# Patient Record
Sex: MALE | Race: WHITE | NOT HISPANIC OR LATINO | Employment: OTHER | URBAN - METROPOLITAN AREA
[De-identification: names, ages, dates, MRNs, and addresses within clinical notes are randomized per-mention and may not be internally consistent; named-entity substitution may affect disease eponyms.]

---

## 2023-10-03 ENCOUNTER — APPOINTMENT (OUTPATIENT)
Dept: RADIOLOGY | Facility: MEDICAL CENTER | Age: 68
DRG: 291 | End: 2023-10-03
Attending: EMERGENCY MEDICINE
Payer: MEDICARE

## 2023-10-03 ENCOUNTER — HOSPITAL ENCOUNTER (INPATIENT)
Facility: MEDICAL CENTER | Age: 68
LOS: 7 days | DRG: 291 | End: 2023-10-11
Attending: EMERGENCY MEDICINE | Admitting: STUDENT IN AN ORGANIZED HEALTH CARE EDUCATION/TRAINING PROGRAM
Payer: MEDICARE

## 2023-10-03 DIAGNOSIS — R06.00 DYSPNEA, UNSPECIFIED TYPE: ICD-10-CM

## 2023-10-03 DIAGNOSIS — Z91.89 AT RISK FOR FLUID VOLUME OVERLOAD: ICD-10-CM

## 2023-10-03 DIAGNOSIS — I10 PRIMARY HYPERTENSION: ICD-10-CM

## 2023-10-03 DIAGNOSIS — J18.9 COMMUNITY ACQUIRED PNEUMONIA OF RIGHT UPPER LOBE OF LUNG: ICD-10-CM

## 2023-10-03 DIAGNOSIS — R09.02 HYPOXIA: Primary | ICD-10-CM

## 2023-10-03 DIAGNOSIS — R91.8 LUNG MASS: ICD-10-CM

## 2023-10-03 DIAGNOSIS — I50.9 ACUTE ON CHRONIC CONGESTIVE HEART FAILURE, UNSPECIFIED HEART FAILURE TYPE (HCC): ICD-10-CM

## 2023-10-03 DIAGNOSIS — M10.9 ACUTE GOUT OF RIGHT KNEE, UNSPECIFIED CAUSE: ICD-10-CM

## 2023-10-03 DIAGNOSIS — R60.0 LOWER EXTREMITY EDEMA: ICD-10-CM

## 2023-10-03 LAB
ALBUMIN SERPL BCP-MCNC: 3.5 G/DL (ref 3.2–4.9)
ALBUMIN/GLOB SERPL: 0.9 G/DL
ALP SERPL-CCNC: 71 U/L (ref 30–99)
ALT SERPL-CCNC: 125 U/L (ref 2–50)
AMPHET UR QL SCN: NEGATIVE
ANION GAP SERPL CALC-SCNC: 10 MMOL/L (ref 7–16)
AST SERPL-CCNC: 34 U/L (ref 12–45)
BARBITURATES UR QL SCN: NEGATIVE
BASOPHILS # BLD AUTO: 0.4 % (ref 0–1.8)
BASOPHILS # BLD: 0.04 K/UL (ref 0–0.12)
BENZODIAZ UR QL SCN: NEGATIVE
BILIRUB SERPL-MCNC: 0.4 MG/DL (ref 0.1–1.5)
BUN SERPL-MCNC: 47 MG/DL (ref 8–22)
BZE UR QL SCN: NEGATIVE
CALCIUM ALBUM COR SERPL-MCNC: 10.1 MG/DL (ref 8.5–10.5)
CALCIUM SERPL-MCNC: 9.7 MG/DL (ref 8.5–10.5)
CANNABINOIDS UR QL SCN: NEGATIVE
CHLORIDE SERPL-SCNC: 100 MMOL/L (ref 96–112)
CO2 SERPL-SCNC: 28 MMOL/L (ref 20–33)
CREAT SERPL-MCNC: 0.87 MG/DL (ref 0.5–1.4)
EKG IMPRESSION: NORMAL
EKG IMPRESSION: NORMAL
EOSINOPHIL # BLD AUTO: 0.1 K/UL (ref 0–0.51)
EOSINOPHIL NFR BLD: 1 % (ref 0–6.9)
ERYTHROCYTE [DISTWIDTH] IN BLOOD BY AUTOMATED COUNT: 58 FL (ref 35.9–50)
ETHANOL BLD-MCNC: <10.1 MG/DL
FENTANYL UR QL: NEGATIVE
GFR SERPLBLD CREATININE-BSD FMLA CKD-EPI: 95 ML/MIN/1.73 M 2
GLOBULIN SER CALC-MCNC: 3.8 G/DL (ref 1.9–3.5)
GLUCOSE SERPL-MCNC: 163 MG/DL (ref 65–99)
HCT VFR BLD AUTO: 63 % (ref 42–52)
HGB BLD-MCNC: 20.4 G/DL (ref 14–18)
IMM GRANULOCYTES # BLD AUTO: 0.05 K/UL (ref 0–0.11)
IMM GRANULOCYTES NFR BLD AUTO: 0.5 % (ref 0–0.9)
LACTATE SERPL-SCNC: 1.5 MMOL/L (ref 0.5–2)
LYMPHOCYTES # BLD AUTO: 1.49 K/UL (ref 1–4.8)
LYMPHOCYTES NFR BLD: 15.2 % (ref 22–41)
MCH RBC QN AUTO: 31.2 PG (ref 27–33)
MCHC RBC AUTO-ENTMCNC: 32.4 G/DL (ref 32.3–36.5)
MCV RBC AUTO: 96.3 FL (ref 81.4–97.8)
METHADONE UR QL SCN: NEGATIVE
MONOCYTES # BLD AUTO: 1.28 K/UL (ref 0–0.85)
MONOCYTES NFR BLD AUTO: 13.1 % (ref 0–13.4)
NEUTROPHILS # BLD AUTO: 6.82 K/UL (ref 1.82–7.42)
NEUTROPHILS NFR BLD: 69.8 % (ref 44–72)
NRBC # BLD AUTO: 0.02 K/UL
NRBC BLD-RTO: 0.2 /100 WBC (ref 0–0.2)
NT-PROBNP SERPL IA-MCNC: 2965 PG/ML (ref 0–125)
OPIATES UR QL SCN: NEGATIVE
OXYCODONE UR QL SCN: NEGATIVE
PCP UR QL SCN: NEGATIVE
PLATELET # BLD AUTO: 279 K/UL (ref 164–446)
PMV BLD AUTO: 9.6 FL (ref 9–12.9)
POTASSIUM SERPL-SCNC: 5.1 MMOL/L (ref 3.6–5.5)
PROPOXYPH UR QL SCN: NEGATIVE
PROT SERPL-MCNC: 7.3 G/DL (ref 6–8.2)
RBC # BLD AUTO: 6.54 M/UL (ref 4.7–6.1)
SODIUM SERPL-SCNC: 138 MMOL/L (ref 135–145)
TROPONIN T SERPL-MCNC: 26 NG/L (ref 6–19)
WBC # BLD AUTO: 9.8 K/UL (ref 4.8–10.8)

## 2023-10-03 PROCEDURE — 94760 N-INVAS EAR/PLS OXIMETRY 1: CPT

## 2023-10-03 PROCEDURE — 80053 COMPREHEN METABOLIC PANEL: CPT

## 2023-10-03 PROCEDURE — 83605 ASSAY OF LACTIC ACID: CPT

## 2023-10-03 PROCEDURE — 83880 ASSAY OF NATRIURETIC PEPTIDE: CPT

## 2023-10-03 PROCEDURE — 84484 ASSAY OF TROPONIN QUANT: CPT

## 2023-10-03 PROCEDURE — 700117 HCHG RX CONTRAST REV CODE 255: Performed by: EMERGENCY MEDICINE

## 2023-10-03 PROCEDURE — 96374 THER/PROPH/DIAG INJ IV PUSH: CPT

## 2023-10-03 PROCEDURE — 71275 CT ANGIOGRAPHY CHEST: CPT

## 2023-10-03 PROCEDURE — 87040 BLOOD CULTURE FOR BACTERIA: CPT

## 2023-10-03 PROCEDURE — 36415 COLL VENOUS BLD VENIPUNCTURE: CPT

## 2023-10-03 PROCEDURE — 96375 TX/PRO/DX INJ NEW DRUG ADDON: CPT

## 2023-10-03 PROCEDURE — A9270 NON-COVERED ITEM OR SERVICE: HCPCS | Performed by: EMERGENCY MEDICINE

## 2023-10-03 PROCEDURE — 80307 DRUG TEST PRSMV CHEM ANLYZR: CPT

## 2023-10-03 PROCEDURE — 99285 EMERGENCY DEPT VISIT HI MDM: CPT

## 2023-10-03 PROCEDURE — 93005 ELECTROCARDIOGRAM TRACING: CPT

## 2023-10-03 PROCEDURE — 85025 COMPLETE CBC W/AUTO DIFF WBC: CPT

## 2023-10-03 PROCEDURE — 82077 ASSAY SPEC XCP UR&BREATH IA: CPT

## 2023-10-03 PROCEDURE — 700111 HCHG RX REV CODE 636 W/ 250 OVERRIDE (IP): Mod: JZ | Performed by: EMERGENCY MEDICINE

## 2023-10-03 PROCEDURE — 93005 ELECTROCARDIOGRAM TRACING: CPT | Performed by: EMERGENCY MEDICINE

## 2023-10-03 PROCEDURE — 700102 HCHG RX REV CODE 250 W/ 637 OVERRIDE(OP): Performed by: EMERGENCY MEDICINE

## 2023-10-03 RX ORDER — FUROSEMIDE 10 MG/ML
80 INJECTION INTRAMUSCULAR; INTRAVENOUS ONCE
Status: COMPLETED | OUTPATIENT
Start: 2023-10-03 | End: 2023-10-03

## 2023-10-03 RX ORDER — CEFTRIAXONE 2 G/1
2000 INJECTION, POWDER, FOR SOLUTION INTRAMUSCULAR; INTRAVENOUS ONCE
Status: COMPLETED | OUTPATIENT
Start: 2023-10-03 | End: 2023-10-03

## 2023-10-03 RX ORDER — AZITHROMYCIN 250 MG/1
500 TABLET, FILM COATED ORAL ONCE
Status: COMPLETED | OUTPATIENT
Start: 2023-10-03 | End: 2023-10-03

## 2023-10-03 RX ADMIN — CEFTRIAXONE SODIUM 2000 MG: 2 INJECTION, POWDER, FOR SOLUTION INTRAMUSCULAR; INTRAVENOUS at 23:08

## 2023-10-03 RX ADMIN — IOHEXOL 50 ML: 350 INJECTION, SOLUTION INTRAVENOUS at 23:32

## 2023-10-03 RX ADMIN — FUROSEMIDE 80 MG: 10 INJECTION INTRAMUSCULAR; INTRAVENOUS at 23:05

## 2023-10-03 RX ADMIN — AZITHROMYCIN 500 MG: 250 TABLET, FILM COATED ORAL at 23:04

## 2023-10-04 ENCOUNTER — APPOINTMENT (OUTPATIENT)
Dept: RADIOLOGY | Facility: MEDICAL CENTER | Age: 68
DRG: 291 | End: 2023-10-04
Attending: HOSPITALIST
Payer: MEDICARE

## 2023-10-04 ENCOUNTER — APPOINTMENT (OUTPATIENT)
Dept: CARDIOLOGY | Facility: MEDICAL CENTER | Age: 68
DRG: 291 | End: 2023-10-04
Payer: MEDICARE

## 2023-10-04 PROBLEM — R60.9 EDEMA: Status: ACTIVE | Noted: 2023-10-04

## 2023-10-04 PROBLEM — R65.10 SIRS (SYSTEMIC INFLAMMATORY RESPONSE SYNDROME) (HCC): Status: ACTIVE | Noted: 2023-10-04

## 2023-10-04 PROBLEM — I50.41 ACUTE COMBINED SYSTOLIC AND DIASTOLIC CHF, NYHA CLASS 1 (HCC): Status: ACTIVE | Noted: 2023-10-04

## 2023-10-04 PROBLEM — I50.813 ACUTE ON CHRONIC RIGHT-SIDED HEART FAILURE (HCC): Status: ACTIVE | Noted: 2023-10-04

## 2023-10-04 PROBLEM — K74.60 CIRRHOSIS (HCC): Status: ACTIVE | Noted: 2023-10-04

## 2023-10-04 PROBLEM — R00.0 TACHYCARDIA: Status: ACTIVE | Noted: 2023-10-04

## 2023-10-04 PROBLEM — R91.8 PULMONARY MASS: Status: ACTIVE | Noted: 2023-10-04

## 2023-10-04 PROBLEM — J96.21 ACUTE ON CHRONIC RESPIRATORY FAILURE WITH HYPOXIA AND HYPERCAPNIA (HCC): Status: ACTIVE | Noted: 2023-10-04

## 2023-10-04 PROBLEM — E11.9 TYPE 2 DIABETES MELLITUS (HCC): Status: ACTIVE | Noted: 2023-10-04

## 2023-10-04 PROBLEM — J18.9 PNEUMONIA: Status: ACTIVE | Noted: 2023-10-04

## 2023-10-04 PROBLEM — J96.01 ACUTE HYPOXIC RESPIRATORY FAILURE (HCC): Status: ACTIVE | Noted: 2023-10-04

## 2023-10-04 PROBLEM — G93.40 ACUTE ENCEPHALOPATHY: Status: ACTIVE | Noted: 2023-10-04

## 2023-10-04 PROBLEM — J96.22 ACUTE ON CHRONIC RESPIRATORY FAILURE WITH HYPOXIA AND HYPERCAPNIA (HCC): Status: ACTIVE | Noted: 2023-10-04

## 2023-10-04 PROBLEM — R93.89 ABNORMAL CT OF THE CHEST: Status: ACTIVE | Noted: 2023-10-04

## 2023-10-04 PROBLEM — I10 HYPERTENSION: Status: ACTIVE | Noted: 2023-10-04

## 2023-10-04 PROBLEM — F10.10 ALCOHOL ABUSE: Status: ACTIVE | Noted: 2023-10-04

## 2023-10-04 LAB
ALBUMIN SERPL BCP-MCNC: 3.4 G/DL (ref 3.2–4.9)
ALBUMIN SERPL BCP-MCNC: 3.9 G/DL (ref 3.2–4.9)
ALBUMIN/GLOB SERPL: 0.9 G/DL
ALBUMIN/GLOB SERPL: 1 G/DL
ALP SERPL-CCNC: 64 U/L (ref 30–99)
ALP SERPL-CCNC: 69 U/L (ref 30–99)
ALT SERPL-CCNC: 101 U/L (ref 2–50)
ALT SERPL-CCNC: 109 U/L (ref 2–50)
AMMONIA PLAS-SCNC: 34 UMOL/L (ref 11–45)
ANION GAP SERPL CALC-SCNC: 10 MMOL/L (ref 7–16)
ANION GAP SERPL CALC-SCNC: 9 MMOL/L (ref 7–16)
AST SERPL-CCNC: 22 U/L (ref 12–45)
AST SERPL-CCNC: 27 U/L (ref 12–45)
B PARAP IS1001 DNA NPH QL NAA+NON-PROBE: NOT DETECTED
B PERT.PT PRMT NPH QL NAA+NON-PROBE: NOT DETECTED
BASE EXCESS BLDA CALC-SCNC: 1 MMOL/L (ref -4–3)
BASE EXCESS BLDA CALC-SCNC: 3 MMOL/L (ref -4–3)
BASOPHILS # BLD AUTO: 0.3 % (ref 0–1.8)
BASOPHILS # BLD: 0.04 K/UL (ref 0–0.12)
BILIRUB SERPL-MCNC: 0.4 MG/DL (ref 0.1–1.5)
BILIRUB SERPL-MCNC: 0.5 MG/DL (ref 0.1–1.5)
BODY TEMPERATURE: 36.7 CENTIGRADE
BODY TEMPERATURE: ABNORMAL DEGREES
BUN SERPL-MCNC: 42 MG/DL (ref 8–22)
BUN SERPL-MCNC: 43 MG/DL (ref 8–22)
C PNEUM DNA NPH QL NAA+NON-PROBE: NOT DETECTED
CALCIUM ALBUM COR SERPL-MCNC: 10 MG/DL (ref 8.5–10.5)
CALCIUM ALBUM COR SERPL-MCNC: 9.6 MG/DL (ref 8.5–10.5)
CALCIUM SERPL-MCNC: 9.5 MG/DL (ref 8.5–10.5)
CALCIUM SERPL-MCNC: 9.5 MG/DL (ref 8.5–10.5)
CHLORIDE SERPL-SCNC: 94 MMOL/L (ref 96–112)
CHLORIDE SERPL-SCNC: 98 MMOL/L (ref 96–112)
CHOLEST SERPL-MCNC: 114 MG/DL (ref 100–199)
CO2 BLDA-SCNC: 37 MMOL/L (ref 20–33)
CO2 SERPL-SCNC: 32 MMOL/L (ref 20–33)
CO2 SERPL-SCNC: 34 MMOL/L (ref 20–33)
CREAT SERPL-MCNC: 0.92 MG/DL (ref 0.5–1.4)
CREAT SERPL-MCNC: 1.01 MG/DL (ref 0.5–1.4)
DELSYS IDSYS: ABNORMAL
EOSINOPHIL # BLD AUTO: 0.11 K/UL (ref 0–0.51)
EOSINOPHIL NFR BLD: 0.9 % (ref 0–6.9)
ERYTHROCYTE [DISTWIDTH] IN BLOOD BY AUTOMATED COUNT: 58.7 FL (ref 35.9–50)
EST. AVERAGE GLUCOSE BLD GHB EST-MCNC: 197 MG/DL
FLUAV RNA NPH QL NAA+NON-PROBE: NOT DETECTED
FLUBV RNA NPH QL NAA+NON-PROBE: NOT DETECTED
GFR SERPLBLD CREATININE-BSD FMLA CKD-EPI: 82 ML/MIN/1.73 M 2
GFR SERPLBLD CREATININE-BSD FMLA CKD-EPI: 92 ML/MIN/1.73 M 2
GLOBULIN SER CALC-MCNC: 3.6 G/DL (ref 1.9–3.5)
GLOBULIN SER CALC-MCNC: 3.8 G/DL (ref 1.9–3.5)
GLUCOSE BLD STRIP.AUTO-MCNC: 146 MG/DL (ref 65–99)
GLUCOSE BLD STRIP.AUTO-MCNC: 264 MG/DL (ref 65–99)
GLUCOSE SERPL-MCNC: 152 MG/DL (ref 65–99)
GLUCOSE SERPL-MCNC: 233 MG/DL (ref 65–99)
HADV DNA NPH QL NAA+NON-PROBE: NOT DETECTED
HBA1C MFR BLD: 8.5 % (ref 4–5.6)
HCO3 BLDA-SCNC: 33 MMOL/L (ref 17–25)
HCO3 BLDA-SCNC: 34.6 MMOL/L (ref 17–25)
HCOV 229E RNA NPH QL NAA+NON-PROBE: NOT DETECTED
HCOV HKU1 RNA NPH QL NAA+NON-PROBE: NOT DETECTED
HCOV NL63 RNA NPH QL NAA+NON-PROBE: NOT DETECTED
HCOV OC43 RNA NPH QL NAA+NON-PROBE: NOT DETECTED
HCT VFR BLD AUTO: 62.4 % (ref 42–52)
HDLC SERPL-MCNC: 26 MG/DL
HGB BLD-MCNC: 19.3 G/DL (ref 14–18)
HMPV RNA NPH QL NAA+NON-PROBE: NOT DETECTED
HOROWITZ INDEX BLDA+IHG-RTO: 164 MM[HG]
HPIV1 RNA NPH QL NAA+NON-PROBE: NOT DETECTED
HPIV2 RNA NPH QL NAA+NON-PROBE: NOT DETECTED
HPIV3 RNA NPH QL NAA+NON-PROBE: NOT DETECTED
HPIV4 RNA NPH QL NAA+NON-PROBE: NOT DETECTED
IMM GRANULOCYTES # BLD AUTO: 0.08 K/UL (ref 0–0.11)
IMM GRANULOCYTES NFR BLD AUTO: 0.7 % (ref 0–0.9)
INHALED O2 FLOW RATE: 6 L/MIN (ref 2–10)
INHALED O2 FLOW RATE: ABNORMAL L/MIN
INR PPP: 0.98 (ref 0.87–1.13)
LDLC SERPL CALC-MCNC: 63 MG/DL
LV EJECT FRACT  99904: 75
LV EJECT FRACT MOD 2C 99903: 76.9
LV EJECT FRACT MOD 4C 99902: 74.02
LV EJECT FRACT MOD BP 99901: 75.65
LYMPHOCYTES # BLD AUTO: 1.49 K/UL (ref 1–4.8)
LYMPHOCYTES NFR BLD: 12.6 % (ref 22–41)
M PNEUMO DNA NPH QL NAA+NON-PROBE: NOT DETECTED
MAGNESIUM SERPL-MCNC: 1.8 MG/DL (ref 1.5–2.5)
MCH RBC QN AUTO: 30.2 PG (ref 27–33)
MCHC RBC AUTO-ENTMCNC: 30.9 G/DL (ref 32.3–36.5)
MCV RBC AUTO: 97.5 FL (ref 81.4–97.8)
MONOCYTES # BLD AUTO: 1.39 K/UL (ref 0–0.85)
MONOCYTES NFR BLD AUTO: 11.8 % (ref 0–13.4)
NEUTROPHILS # BLD AUTO: 8.7 K/UL (ref 1.82–7.42)
NEUTROPHILS NFR BLD: 73.7 % (ref 44–72)
NRBC # BLD AUTO: 0 K/UL
NRBC BLD-RTO: 0 /100 WBC (ref 0–0.2)
O2/TOTAL GAS SETTING VFR VENT: 50 %
PCO2 BLDA: 77.8 MMHG (ref 26–37)
PCO2 BLDA: 80 MMHG (ref 26–37)
PCO2 TEMP ADJ BLDA: 75.3 MMHG (ref 26–37)
PCO2 TEMP ADJ BLDA: 79 MMHG (ref 26–37)
PH BLDA: 7.23 [PH] (ref 7.4–7.5)
PH BLDA: 7.26 [PH] (ref 7.4–7.5)
PH TEMP ADJ BLDA: 7.23 [PH] (ref 7.4–7.5)
PH TEMP ADJ BLDA: 7.27 [PH] (ref 7.4–7.5)
PHOSPHATE SERPL-MCNC: 6.2 MG/DL (ref 2.5–4.5)
PLATELET # BLD AUTO: 274 K/UL (ref 164–446)
PMV BLD AUTO: 9.5 FL (ref 9–12.9)
PO2 BLDA: 79.5 MMHG (ref 64–87)
PO2 BLDA: 82 MMHG (ref 64–87)
PO2 TEMP ADJ BLDA: 77.9 MMHG (ref 64–87)
PO2 TEMP ADJ BLDA: 78 MMHG (ref 64–87)
POTASSIUM SERPL-SCNC: 5.1 MMOL/L (ref 3.6–5.5)
POTASSIUM SERPL-SCNC: 5.2 MMOL/L (ref 3.6–5.5)
PROT SERPL-MCNC: 7 G/DL (ref 6–8.2)
PROT SERPL-MCNC: 7.7 G/DL (ref 6–8.2)
PROTHROMBIN TIME: 13.1 SEC (ref 12–14.6)
RBC # BLD AUTO: 6.4 M/UL (ref 4.7–6.1)
RSV RNA NPH QL NAA+NON-PROBE: NOT DETECTED
RV+EV RNA NPH QL NAA+NON-PROBE: NOT DETECTED
SAO2 % BLDA: 93 % (ref 93–99)
SAO2 % BLDA: 93.8 % (ref 93–99)
SARS-COV-2 RNA NPH QL NAA+NON-PROBE: NOTDETECTED
SODIUM SERPL-SCNC: 137 MMOL/L (ref 135–145)
SODIUM SERPL-SCNC: 140 MMOL/L (ref 135–145)
SPECIMEN DRAWN FROM PATIENT: ABNORMAL
TRIGL SERPL-MCNC: 124 MG/DL (ref 0–149)
TROPONIN T SERPL-MCNC: 31 NG/L (ref 6–19)
WBC # BLD AUTO: 11.8 K/UL (ref 4.8–10.8)

## 2023-10-04 PROCEDURE — 700111 HCHG RX REV CODE 636 W/ 250 OVERRIDE (IP): Mod: JZ

## 2023-10-04 PROCEDURE — 82803 BLOOD GASES ANY COMBINATION: CPT

## 2023-10-04 PROCEDURE — 700101 HCHG RX REV CODE 250

## 2023-10-04 PROCEDURE — 87581 M.PNEUMON DNA AMP PROBE: CPT

## 2023-10-04 PROCEDURE — A9270 NON-COVERED ITEM OR SERVICE: HCPCS

## 2023-10-04 PROCEDURE — 700111 HCHG RX REV CODE 636 W/ 250 OVERRIDE (IP): Performed by: INTERNAL MEDICINE

## 2023-10-04 PROCEDURE — 770022 HCHG ROOM/CARE - ICU (200)

## 2023-10-04 PROCEDURE — 87486 CHLMYD PNEUM DNA AMP PROBE: CPT

## 2023-10-04 PROCEDURE — 70450 CT HEAD/BRAIN W/O DYE: CPT

## 2023-10-04 PROCEDURE — 99223 1ST HOSP IP/OBS HIGH 75: CPT | Mod: AI,GC | Performed by: STUDENT IN AN ORGANIZED HEALTH CARE EDUCATION/TRAINING PROGRAM

## 2023-10-04 PROCEDURE — 0042T CT-CEREBRAL PERFUSION ANALYSIS: CPT

## 2023-10-04 PROCEDURE — 93306 TTE W/DOPPLER COMPLETE: CPT

## 2023-10-04 PROCEDURE — 87798 DETECT AGENT NOS DNA AMP: CPT | Mod: 91

## 2023-10-04 PROCEDURE — 82962 GLUCOSE BLOOD TEST: CPT

## 2023-10-04 PROCEDURE — 99222 1ST HOSP IP/OBS MODERATE 55: CPT | Mod: FS | Performed by: NURSE PRACTITIONER

## 2023-10-04 PROCEDURE — 70496 CT ANGIOGRAPHY HEAD: CPT

## 2023-10-04 PROCEDURE — 80053 COMPREHEN METABOLIC PANEL: CPT

## 2023-10-04 PROCEDURE — 82140 ASSAY OF AMMONIA: CPT

## 2023-10-04 PROCEDURE — 83735 ASSAY OF MAGNESIUM: CPT

## 2023-10-04 PROCEDURE — 93306 TTE W/DOPPLER COMPLETE: CPT | Mod: 26 | Performed by: INTERNAL MEDICINE

## 2023-10-04 PROCEDURE — 83036 HEMOGLOBIN GLYCOSYLATED A1C: CPT

## 2023-10-04 PROCEDURE — 36600 WITHDRAWAL OF ARTERIAL BLOOD: CPT

## 2023-10-04 PROCEDURE — 80061 LIPID PANEL: CPT

## 2023-10-04 PROCEDURE — 85025 COMPLETE CBC W/AUTO DIFF WBC: CPT

## 2023-10-04 PROCEDURE — 85610 PROTHROMBIN TIME: CPT

## 2023-10-04 PROCEDURE — 36415 COLL VENOUS BLD VENIPUNCTURE: CPT

## 2023-10-04 PROCEDURE — 70498 CT ANGIOGRAPHY NECK: CPT

## 2023-10-04 PROCEDURE — 87633 RESP VIRUS 12-25 TARGETS: CPT

## 2023-10-04 PROCEDURE — 700111 HCHG RX REV CODE 636 W/ 250 OVERRIDE (IP): Mod: JZ | Performed by: HOSPITALIST

## 2023-10-04 PROCEDURE — 99291 CRITICAL CARE FIRST HOUR: CPT | Performed by: INTERNAL MEDICINE

## 2023-10-04 PROCEDURE — 700105 HCHG RX REV CODE 258: Performed by: HOSPITALIST

## 2023-10-04 PROCEDURE — 84484 ASSAY OF TROPONIN QUANT: CPT

## 2023-10-04 PROCEDURE — 700102 HCHG RX REV CODE 250 W/ 637 OVERRIDE(OP)

## 2023-10-04 PROCEDURE — 700105 HCHG RX REV CODE 258: Performed by: INTERNAL MEDICINE

## 2023-10-04 PROCEDURE — 94660 CPAP INITIATION&MGMT: CPT

## 2023-10-04 PROCEDURE — 84100 ASSAY OF PHOSPHORUS: CPT

## 2023-10-04 PROCEDURE — 700111 HCHG RX REV CODE 636 W/ 250 OVERRIDE (IP)

## 2023-10-04 RX ORDER — GAUZE BANDAGE 2" X 2"
100 BANDAGE TOPICAL DAILY
Status: DISCONTINUED | OUTPATIENT
Start: 2023-10-08 | End: 2023-10-11 | Stop reason: HOSPADM

## 2023-10-04 RX ORDER — FUROSEMIDE 10 MG/ML
40 INJECTION INTRAMUSCULAR; INTRAVENOUS
Status: DISCONTINUED | OUTPATIENT
Start: 2023-10-04 | End: 2023-10-10

## 2023-10-04 RX ORDER — METOPROLOL SUCCINATE 100 MG/1
100 TABLET, EXTENDED RELEASE ORAL
Status: DISCONTINUED | OUTPATIENT
Start: 2023-10-05 | End: 2023-10-11 | Stop reason: HOSPADM

## 2023-10-04 RX ORDER — NALOXONE HYDROCHLORIDE 0.4 MG/ML
INJECTION, SOLUTION INTRAMUSCULAR; INTRAVENOUS; SUBCUTANEOUS
Status: COMPLETED
Start: 2023-10-04 | End: 2023-10-04

## 2023-10-04 RX ORDER — DAPAGLIFLOZIN 10 MG/1
10 TABLET, FILM COATED ORAL DAILY
COMMUNITY

## 2023-10-04 RX ORDER — ACETAMINOPHEN 325 MG/1
650 TABLET ORAL EVERY 6 HOURS PRN
Status: DISCONTINUED | OUTPATIENT
Start: 2023-10-04 | End: 2023-10-11 | Stop reason: HOSPADM

## 2023-10-04 RX ORDER — NALOXONE HYDROCHLORIDE 0.4 MG/ML
0.08 INJECTION, SOLUTION INTRAMUSCULAR; INTRAVENOUS; SUBCUTANEOUS ONCE
Status: COMPLETED | OUTPATIENT
Start: 2023-10-04 | End: 2023-10-04

## 2023-10-04 RX ORDER — AMOXICILLIN 250 MG
2 CAPSULE ORAL 2 TIMES DAILY
Status: DISCONTINUED | OUTPATIENT
Start: 2023-10-04 | End: 2023-10-11 | Stop reason: HOSPADM

## 2023-10-04 RX ORDER — AMLODIPINE BESYLATE 5 MG/1
5 TABLET ORAL
Status: DISCONTINUED | OUTPATIENT
Start: 2023-10-04 | End: 2023-10-05

## 2023-10-04 RX ORDER — FUROSEMIDE 40 MG/1
40 TABLET ORAL DAILY
COMMUNITY

## 2023-10-04 RX ORDER — BISACODYL 10 MG
10 SUPPOSITORY, RECTAL RECTAL
Status: DISCONTINUED | OUTPATIENT
Start: 2023-10-04 | End: 2023-10-11 | Stop reason: HOSPADM

## 2023-10-04 RX ORDER — ALLOPURINOL 100 MG/1
100 TABLET ORAL DAILY
COMMUNITY

## 2023-10-04 RX ORDER — HEPARIN SODIUM 5000 [USP'U]/ML
5000 INJECTION, SOLUTION INTRAVENOUS; SUBCUTANEOUS EVERY 8 HOURS
Status: DISCONTINUED | OUTPATIENT
Start: 2023-10-04 | End: 2023-10-04

## 2023-10-04 RX ORDER — NALOXONE HYDROCHLORIDE 0.4 MG/ML
1 INJECTION, SOLUTION INTRAMUSCULAR; INTRAVENOUS; SUBCUTANEOUS ONCE
Status: DISCONTINUED | OUTPATIENT
Start: 2023-10-04 | End: 2023-10-04

## 2023-10-04 RX ORDER — POLYETHYLENE GLYCOL 3350 17 G/17G
1 POWDER, FOR SOLUTION ORAL
Status: DISCONTINUED | OUTPATIENT
Start: 2023-10-04 | End: 2023-10-11 | Stop reason: HOSPADM

## 2023-10-04 RX ORDER — DEXTROSE MONOHYDRATE 25 G/50ML
25 INJECTION, SOLUTION INTRAVENOUS
Status: DISCONTINUED | OUTPATIENT
Start: 2023-10-04 | End: 2023-10-11 | Stop reason: HOSPADM

## 2023-10-04 RX ORDER — AZITHROMYCIN 250 MG/1
500 TABLET, FILM COATED ORAL DAILY
Status: DISCONTINUED | OUTPATIENT
Start: 2023-10-04 | End: 2023-10-05

## 2023-10-04 RX ORDER — METOPROLOL TARTRATE 100 MG/1
100 TABLET ORAL DAILY
Status: ON HOLD | COMMUNITY
End: 2023-10-11

## 2023-10-04 RX ORDER — FUROSEMIDE 40 MG/1
40 TABLET ORAL DAILY
Status: DISCONTINUED | OUTPATIENT
Start: 2023-10-04 | End: 2023-10-04

## 2023-10-04 RX ADMIN — METOPROLOL TARTRATE 100 MG: 50 TABLET, FILM COATED ORAL at 04:53

## 2023-10-04 RX ADMIN — CEFTRIAXONE SODIUM 1000 MG: 10 INJECTION, POWDER, FOR SOLUTION INTRAVENOUS at 03:12

## 2023-10-04 RX ADMIN — FUROSEMIDE 40 MG: 10 INJECTION INTRAMUSCULAR; INTRAVENOUS at 04:53

## 2023-10-04 RX ADMIN — FUROSEMIDE 40 MG: 10 INJECTION INTRAMUSCULAR; INTRAVENOUS at 16:42

## 2023-10-04 RX ADMIN — NALOXONE HYDROCHLORIDE 0.8 MG: 0.4 INJECTION, SOLUTION INTRAMUSCULAR; INTRAVENOUS; SUBCUTANEOUS at 14:35

## 2023-10-04 RX ADMIN — HEPARIN SODIUM 5000 UNITS: 5000 INJECTION, SOLUTION INTRAVENOUS; SUBCUTANEOUS at 05:51

## 2023-10-04 RX ADMIN — DOCUSATE SODIUM 50 MG AND SENNOSIDES 8.6 MG 2 TABLET: 8.6; 5 TABLET, FILM COATED ORAL at 05:51

## 2023-10-04 RX ADMIN — AMPICILLIN AND SULBACTAM 3 G: 1; 2 INJECTION, POWDER, FOR SOLUTION INTRAMUSCULAR; INTRAVENOUS at 16:43

## 2023-10-04 RX ADMIN — THIAMINE HYDROCHLORIDE 500 MG: 100 INJECTION, SOLUTION INTRAMUSCULAR; INTRAVENOUS at 20:19

## 2023-10-04 ASSESSMENT — COGNITIVE AND FUNCTIONAL STATUS - GENERAL
MOBILITY SCORE: 20
MOVING TO AND FROM BED TO CHAIR: A LITTLE
SUGGESTED CMS G CODE MODIFIER MOBILITY: CJ
MOVING FROM LYING ON BACK TO SITTING ON SIDE OF FLAT BED: A LITTLE
SUGGESTED CMS G CODE MODIFIER DAILY ACTIVITY: CH
CLIMB 3 TO 5 STEPS WITH RAILING: A LITTLE
STANDING UP FROM CHAIR USING ARMS: A LITTLE
DAILY ACTIVITIY SCORE: 24

## 2023-10-04 ASSESSMENT — LIFESTYLE VARIABLES
TOTAL SCORE: 4
HOW MANY TIMES IN THE PAST YEAR HAVE YOU HAD 5 OR MORE DRINKS IN A DAY: 0
AVERAGE NUMBER OF DAYS PER WEEK YOU HAVE A DRINK CONTAINING ALCOHOL: 0
ALCOHOL_USE: NO
HAVE PEOPLE ANNOYED YOU BY CRITICIZING YOUR DRINKING: YES
CONSUMPTION TOTAL: POSITIVE
EVER HAD A DRINK FIRST THING IN THE MORNING TO STEADY YOUR NERVES TO GET RID OF A HANGOVER: YES
HAVE YOU EVER FELT YOU SHOULD CUT DOWN ON YOUR DRINKING: YES
DOES PATIENT WANT TO TALK TO SOMEONE ABOUT QUITTING: NO
DOES PATIENT WANT TO STOP DRINKING: YES
EVER FELT BAD OR GUILTY ABOUT YOUR DRINKING: YES
ON A TYPICAL DAY WHEN YOU DRINK ALCOHOL HOW MANY DRINKS DO YOU HAVE: 0
TOTAL SCORE: 4
TOTAL SCORE: 4

## 2023-10-04 ASSESSMENT — PATIENT HEALTH QUESTIONNAIRE - PHQ9
1. LITTLE INTEREST OR PLEASURE IN DOING THINGS: NOT AT ALL
SUM OF ALL RESPONSES TO PHQ9 QUESTIONS 1 AND 2: 0
2. FEELING DOWN, DEPRESSED, IRRITABLE, OR HOPELESS: NOT AT ALL
1. LITTLE INTEREST OR PLEASURE IN DOING THINGS: NOT AT ALL
SUM OF ALL RESPONSES TO PHQ9 QUESTIONS 1 AND 2: 0
2. FEELING DOWN, DEPRESSED, IRRITABLE, OR HOPELESS: NOT AT ALL

## 2023-10-04 ASSESSMENT — PULMONARY FUNCTION TESTS
EPAP_CMH2O: 10

## 2023-10-04 ASSESSMENT — FIBROSIS 4 INDEX: FIB4 SCORE: 0.72

## 2023-10-04 ASSESSMENT — PAIN DESCRIPTION - PAIN TYPE
TYPE: ACUTE PAIN

## 2023-10-04 NOTE — ASSESSMENT & PLAN NOTE
Acute metabolic encephalopathy due to hypercarbia  Limit sedatives and mind altering medications is much as possible  Follow neuro exam  Resolved this morning

## 2023-10-04 NOTE — ASSESSMENT & PLAN NOTE
Suspect chronic hypoxemia with polycythemia on presentation  I am titrating BiPAP for respiratory distress  High risk of deterioration

## 2023-10-04 NOTE — ASSESSMENT & PLAN NOTE
Likely in the setting of acute hypoxic respiratory failure.  EKG: Sinus rhythm, age indeterminate anteroseptal infarct, QTc 483   -Continue home metoprolol tartrate 100 mg daily  -See assessment and plan for acute hypoxic respiratory failure    continue monitoring

## 2023-10-04 NOTE — PROGRESS NOTES
Report received from RN, Nika. Pt transported to room, oriented to call light. Placed on 6L NC, satting low 90s.

## 2023-10-04 NOTE — ASSESSMENT & PLAN NOTE
MRI negative for acute pathology, it did show mild microvascular ischemic changes  History of EtOH cirrhosis.  Ammonia 38  Improving, still has episodes of confusion

## 2023-10-04 NOTE — CARE PLAN
The patient is Stable - Low risk of patient condition declining or worsening    Shift Goals  Clinical Goals: Sputum culture, monitor resp. status  Patient Goals: POC, Rest  Family Goals: GALE    Progress made toward(s) clinical / shift goals:    Problem: Fluid Volume  Goal: Fluid volume balance will be maintained  Description: Target End Date:  Prior to discharge or change in level of care    Document on I/O flowsheet    1.  Monitor intake and output as ordered  2.  Promote oral intake as appropriate  3.  Report inadequate intake or output to physician  4.  Administer IV therapy as ordered  5.  Weights per provider order  6.  Assess for signs and symptoms of bleeding  7.  Monitor for signs of fluid overload (respiratory changes, edema, weight gain, increased abdominal girth)  8.  Monitor of signs for inadequate fluid volume (poor skin turgor, dry mucous membranes)  9.  Instruct patient on adherence to fluid restrictions  Outcome: Progressing  Note: Pt is in FVO. Pt has 2+ pitting edema in BLLE. Pt displays increased work of breathing with productive, moist cough. Is and Os continually monitored. Pt is being diuresed with lasix.    Problem: Respiratory  Goal: Patient will achieve/maintain optimum respiratory ventilation and gas exchange  Description: Target End Date:  Prior to discharge or change in level of care    Document on Assessment flowsheet    1.  Assess and monitor rate, rhythm, depth and effort of respiration  2.  Breath sounds assessed qshift and/or as needed  3.  Assess O2 saturation, administer/titrate oxygen as ordered  4.  Position patient for maximum ventilatory efficiency  5.  Turn, cough, and deep breath with splinting to improve effectiveness  6.  Collaborate with RT to administer medication/treatments per order  7.  Encourage use of incentive spirometer and encourage patient to cough after use and utilize splinting techniques if applicable  8.  Airway suctioning  9.  Monitor sputum production for  changes in color, consistency and frequency  10. Perform frequent oral hygiene  11. Alternate physical activity with rest periods  Outcome: Progressing  Note: Pt educated on deep breath and cough, pt demonstrates this appropriately. Pt is on 6L NC, satting mid 90s. Pt lung sounds diminished in all quadrants. Pt has a frequent productive, moist cough. Pt educated on sputum culture collection.

## 2023-10-04 NOTE — CONSULTS
"Critical Care Consultation    Date of consult: 10/4/2023    Referring Physician  JAMILAH Delgado.*    Reason for Consultation  Hypercarbic respiratory failure    History of Presenting Illness  68 y.o. male ho obesity, DM, HTN, smoking, drinking who presented 10/3/2023 with SOB. History obtained from chart review as patient is not answering questions d/t mental status. CT chest with large LLL infiltrate. Has been admitted to the hospital for hypoxia and been treated with antibiotics for pneumonia. Today the patient was noted to have a decrease LOC. Stroke alert was called, imagine unremarkable.  Neuro consult reviewed. ABG showed marked hypercarbia with pH 7.23 and PCO2 79 so I was called.  I noted pupils were small at the bedside so gave 1mg narcan. I initially thought there was a possible response because patient answered to voice more briskly and said \"hospital\" for where he was, which was an improvement, but then he quickly went back to the same LOC as before the narcan.     Code Status  Full Code    Review of Systems  Review of Systems   Unable to perform ROS: Mental status change       Past Medical History   has a past medical history of COPD (chronic obstructive pulmonary disease) (HCC), Diabetes (HCC), and Hypertension.    Surgical History   has a past surgical history that includes other abdominal surgery and appendectomy (1977).    Family History  family history is not on file.    Social History   reports that he quit smoking about 23 years ago. His smoking use included cigars. He started smoking about 51 years ago. He has never used smokeless tobacco. He reports that he does not currently use alcohol. He reports that he does not use drugs.    Medications  Home Medications       Reviewed by Nita Wick (Pharmacy Tech) on 10/04/23 at 0958  Med List Status: Complete     Medication Last Dose Status   allopurinol (ZYLOPRIM) 100 MG Tab UNK Active   dapagliflozin propanediol (FARXIGA) 10 MG Tab " UNK Active   furosemide (LASIX) 40 MG Tab 10/3/2023 Active   metoprolol tartrate (LOPRESSOR) 100 MG Tab 10/3/2023 Active                  Current Facility-Administered Medications   Medication Dose Route Frequency Provider Last Rate Last Admin    senna-docusate (Pericolace Or Senokot S) 8.6-50 MG per tablet 2 Tablet  2 Tablet Oral BID Radha Brooks M.D.   2 Tablet at 10/04/23 0551    And    polyethylene glycol/lytes (Miralax) PACKET 1 Packet  1 Packet Oral QDAY PRN Radha Brooks M.D.        And    magnesium hydroxide (Milk Of Magnesia) suspension 30 mL  30 mL Oral QDAY PRN Radha Brooks M.D.        And    bisacodyl (Dulcolax) suppository 10 mg  10 mg Rectal QDAY PRN Radha Brooks M.D.        heparin injection 5,000 Units  5,000 Units Subcutaneous Q8HRS Radha Brooks M.D.   5,000 Units at 10/04/23 0551    acetaminophen (Tylenol) tablet 650 mg  650 mg Oral Q6HRS PRN Radha Brooks M.D.        furosemide (Lasix) injection 40 mg  40 mg Intravenous BID DIURETIC Radha Brooks M.D.   40 mg at 10/04/23 0453    azithromycin (Zithromax) tablet 500 mg  500 mg Oral DAILY Radha Brooks M.D.        Respiratory Therapy Consult   Nebulization Continuous RT Radha Brooks M.D.        metoprolol tartrate (Lopressor) tablet 100 mg  100 mg Oral DAILY Radha Brooks M.D.   100 mg at 10/04/23 0453    ampicillin/sulbactam (Unasyn) 3 g in  mL IVPB  3 g Intravenous Q6HRS Lloyd Quevedo M.D.        amLODIPine (Norvasc) tablet 5 mg  5 mg Oral Q DAY Lloyd Quevedo M.D.           Allergies  No Known Allergies    Vital Signs last 24 hours  Temp:  [36.2 °C (97.2 °F)-36.9 °C (98.4 °F)] 36.8 °C (98.2 °F)  Pulse:  [] 94  Resp:  [15-22] 20  BP: (110-182)/() 113/53  SpO2:  [89 %-95 %] 94 %    Physical Exam  Physical Exam  Constitutional:       General: He is not in acute distress.     Appearance: He is obese. He is ill-appearing.   HENT:      Head: Normocephalic and atraumatic.      Mouth/Throat:       Mouth: Mucous membranes are moist.   Eyes:      Pupils: Pupils are equal, round, and reactive to light.   Cardiovascular:      Rate and Rhythm: Normal rate and regular rhythm.      Comments: Distant heart tones  Pulmonary:      Effort: No respiratory distress.      Breath sounds: No wheezing, rhonchi or rales.      Comments: Hypoventilatory, low rate and shallow breaths  Abdominal:      General: Bowel sounds are normal. There is distension.      Tenderness: There is no abdominal tenderness.   Musculoskeletal:      Right lower leg: Edema (2+) present.      Left lower leg: Edema (2+) present.   Skin:     General: Skin is warm and dry.   Neurological:      Comments: Arouses to voice, then immediately falls back asleep.  Pupils are small and reactive. Not participating well with neuro exam.   Psychiatric:      Comments: Unable to assess       Fluids    Intake/Output Summary (Last 24 hours) at 10/4/2023 1442  Last data filed at 10/4/2023 1223  Gross per 24 hour   Intake 360 ml   Output 1100 ml   Net -740 ml       Laboratory  Recent Results (from the past 48 hour(s))   EKG    Collection Time: 10/03/23  8:43 PM   Result Value Ref Range    Report       [facility]    Test Date:  2023-10-03  Pt Name:    DEBBI REYES                  Department: ER  MRN:        [id number]                      Room:  Gender:     Male                         Technician: [id number]  :        1955                   Requested By:ER TRIAGE PROTOCOL  Order #:    [id number]                    Reading MD: Bertin Rosenthal    Measurements  Intervals                                Axis  Rate:       96                           P:          20  VT:         179                          QRS:        -75  QRSD:       101                          T:          -43  QT:         355  QTc:        449    Interpretive Statements  Sinus rhythm  Probable left atrial enlargement  Inferior infarct, age indeterminate  Anterior infarct,  age indeterminate  No previous ECG available for comparison  Electronically Signed On 10- 22:21:01 PDT by Bertin Rosenthal     EKG    Collection Time: 10/03/23  9:05 PM   Result Value Ref Range    Report       [facility]    Test Date:  2023-10-03  Pt Name:    DEBIB REYES                  Department: ER  MRN:        [id number]                      Room:       [facility]  Gender:     Male                         Technician: [id number]  :        1955                   Requested By:ER TRIAGE PROTOCOL  Order #:    [id number]                    Reading MD: Bertin Rosenthal    Measurements  Intervals                                Axis  Rate:       99                           P:          45  KY:         182                          QRS:        -84  QRSD:       87                           T:          -44  QT:         376  QTc:        483    Interpretive Statements  Sinus rhythm  Probable left atrial enlargement  S1,S2,S3 pattern  Anteroseptal infarct, age indeterminate  Compared to ECG 10/03/2023 20:43:00  No significant changes  Electronically Signed On 10- 22:20:59 PDT by Bertin Rosenthal     CBC WITH DIFFERENTIAL    Collection Time: 10/03/23  9:27 PM   Result Value Ref Range    WBC 9.8 4.8 - 10.8 K/uL    RBC 6.54 (H) 4.70 - 6.10 M/uL    Hemoglobin 20.4 (H) 14.0 - 18.0 g/dL    Hematocrit 63.0 (H) 42.0 - 52.0 %    MCV 96.3 81.4 - 97.8 fL    MCH 31.2 27.0 - 33.0 pg    MCHC 32.4 32.3 - 36.5 g/dL    RDW 58.0 (H) 35.9 - 50.0 fL    Platelet Count 279 164 - 446 K/uL    MPV 9.6 9.0 - 12.9 fL    Neutrophils-Polys 69.80 44.00 - 72.00 %    Lymphocytes 15.20 (L) 22.00 - 41.00 %    Monocytes 13.10 0.00 - 13.40 %    Eosinophils 1.00 0.00 - 6.90 %    Basophils 0.40 0.00 - 1.80 %    Immature Granulocytes 0.50 0.00 - 0.90 %    Nucleated RBC 0.20 0.00 - 0.20 /100 WBC    Neutrophils (Absolute) 6.82 1.82 - 7.42 K/uL    Lymphs (Absolute) 1.49 1.00 - 4.80 K/uL    Monos (Absolute) 1.28 (H) 0.00  - 0.85 K/uL    Eos (Absolute) 0.10 0.00 - 0.51 K/uL    Baso (Absolute) 0.04 0.00 - 0.12 K/uL    Immature Granulocytes (abs) 0.05 0.00 - 0.11 K/uL    NRBC (Absolute) 0.02 K/uL   COMP METABOLIC PANEL    Collection Time: 10/03/23  9:27 PM   Result Value Ref Range    Sodium 138 135 - 145 mmol/L    Potassium 5.1 3.6 - 5.5 mmol/L    Chloride 100 96 - 112 mmol/L    Co2 28 20 - 33 mmol/L    Anion Gap 10.0 7.0 - 16.0    Glucose 163 (H) 65 - 99 mg/dL    Bun 47 (H) 8 - 22 mg/dL    Creatinine 0.87 0.50 - 1.40 mg/dL    Calcium 9.7 8.5 - 10.5 mg/dL    Correct Calcium 10.1 8.5 - 10.5 mg/dL    AST(SGOT) 34 12 - 45 U/L    ALT(SGPT) 125 (H) 2 - 50 U/L    Alkaline Phosphatase 71 30 - 99 U/L    Total Bilirubin 0.4 0.1 - 1.5 mg/dL    Albumin 3.5 3.2 - 4.9 g/dL    Total Protein 7.3 6.0 - 8.2 g/dL    Globulin 3.8 (H) 1.9 - 3.5 g/dL    A-G Ratio 0.9 g/dL   TROPONIN    Collection Time: 10/03/23  9:27 PM   Result Value Ref Range    Troponin T 26 (H) 6 - 19 ng/L   proBrain Natriuretic Peptide, NT    Collection Time: 10/03/23  9:27 PM   Result Value Ref Range    NT-proBNP 2965 (H) 0 - 125 pg/mL   DIAGNOSTIC ALCOHOL    Collection Time: 10/03/23  9:27 PM   Result Value Ref Range    Diagnostic Alcohol <10.1 <10.1 mg/dL   LACTIC ACID    Collection Time: 10/03/23  9:27 PM   Result Value Ref Range    Lactic Acid 1.5 0.5 - 2.0 mmol/L   BLOOD CULTURE    Collection Time: 10/03/23  9:27 PM    Specimen: Peripheral; Blood   Result Value Ref Range    Significant Indicator NEG     Source BLD     Site PERIPHERAL     Culture Result       No Growth  Note: Blood cultures are incubated for 5 days and  are monitored continuously.Positive blood cultures  are called to the RN and reported as soon as  they are identified.     ESTIMATED GFR    Collection Time: 10/03/23  9:27 PM   Result Value Ref Range    GFR (CKD-EPI) 95 >60 mL/min/1.73 m 2   BLOOD CULTURE    Collection Time: 10/03/23  9:57 PM    Specimen: Peripheral; Blood   Result Value Ref Range    Significant  Indicator NEG     Source BLD     Site PERIPHERAL     Culture Result       No Growth  Note: Blood cultures are incubated for 5 days and  are monitored continuously.Positive blood cultures  are called to the RN and reported as soon as  they are identified.     URINE DRUG SCREEN    Collection Time: 10/03/23 11:16 PM   Result Value Ref Range    Amphetamines Urine Negative Negative    Barbiturates Negative Negative    Benzodiazepines Negative Negative    Cocaine Metabolite Negative Negative    Fentanyl, Urine Negative Negative    Methadone Negative Negative    Opiates Negative Negative    Oxycodone Negative Negative    Phencyclidine -Pcp Negative Negative    Propoxyphene Negative Negative    Cannabinoid Metab Negative Negative   Respiratory Panel by PCR (Inpatient ONLY)    Collection Time: 10/04/23  3:52 AM    Specimen: Nasopharyngeal; Respirate   Result Value Ref Range    Adenovirus, PCR Not Detected     SARS-CoV-2 (COVID-19) RNA by HARIS NotDetected     Coronavirus 229E, PCR Not Detected     Coronavirus HKU1, PCR Not Detected     Coronavirus NL63, PCR Not Detected     Coronavirus OC43, PCR Not Detected     Human Metapneumovirus, PCR Not Detected     Rhino/Enterovirus, PCR Not Detected     Influenza A, PCR Not Detected     Influenza B, PCR Not Detected     Parainfluenza 1, PCR Not Detected     Parainfluenza 2, PCR Not Detected     Parainfluenza 3, PCR Not Detected     Parainfluenza 4, PCR Not Detected     RSV (Respiratory Syncytial Virus), PCR Not Detected     Bordetella parapertussis ([id number]), PCR Not Detected     Bordetella pertussis (ptxP), PCR Not Detected     Mycoplasma pneumoniae, PCR Not Detected     Chlamydia pneumoniae, PCR Not Detected    CBC with Differential    Collection Time: 10/04/23  4:23 AM   Result Value Ref Range    WBC 11.8 (H) 4.8 - 10.8 K/uL    RBC 6.40 (H) 4.70 - 6.10 M/uL    Hemoglobin 19.3 (H) 14.0 - 18.0 g/dL    Hematocrit 62.4 (H) 42.0 - 52.0 %    MCV 97.5 81.4 - 97.8 fL    MCH 30.2 27.0 - 33.0  pg    MCHC 30.9 (L) 32.3 - 36.5 g/dL    RDW 58.7 (H) 35.9 - 50.0 fL    Platelet Count 274 164 - 446 K/uL    MPV 9.5 9.0 - 12.9 fL    Neutrophils-Polys 73.70 (H) 44.00 - 72.00 %    Lymphocytes 12.60 (L) 22.00 - 41.00 %    Monocytes 11.80 0.00 - 13.40 %    Eosinophils 0.90 0.00 - 6.90 %    Basophils 0.30 0.00 - 1.80 %    Immature Granulocytes 0.70 0.00 - 0.90 %    Nucleated RBC 0.00 0.00 - 0.20 /100 WBC    Neutrophils (Absolute) 8.70 (H) 1.82 - 7.42 K/uL    Lymphs (Absolute) 1.49 1.00 - 4.80 K/uL    Monos (Absolute) 1.39 (H) 0.00 - 0.85 K/uL    Eos (Absolute) 0.11 0.00 - 0.51 K/uL    Baso (Absolute) 0.04 0.00 - 0.12 K/uL    Immature Granulocytes (abs) 0.08 0.00 - 0.11 K/uL    NRBC (Absolute) 0.00 K/uL   Comp Metabolic Panel (CMP)    Collection Time: 10/04/23  4:23 AM   Result Value Ref Range    Sodium 140 135 - 145 mmol/L    Potassium 5.2 3.6 - 5.5 mmol/L    Chloride 98 96 - 112 mmol/L    Co2 32 20 - 33 mmol/L    Anion Gap 10.0 7.0 - 16.0    Glucose 152 (H) 65 - 99 mg/dL    Bun 43 (H) 8 - 22 mg/dL    Creatinine 0.92 0.50 - 1.40 mg/dL    Calcium 9.5 8.5 - 10.5 mg/dL    Correct Calcium 10.0 8.5 - 10.5 mg/dL    AST(SGOT) 27 12 - 45 U/L    ALT(SGPT) 109 (H) 2 - 50 U/L    Alkaline Phosphatase 64 30 - 99 U/L    Total Bilirubin 0.4 0.1 - 1.5 mg/dL    Albumin 3.4 3.2 - 4.9 g/dL    Total Protein 7.0 6.0 - 8.2 g/dL    Globulin 3.6 (H) 1.9 - 3.5 g/dL    A-G Ratio 0.9 g/dL   HEMOGLOBIN A1C    Collection Time: 10/04/23  4:23 AM   Result Value Ref Range    Glycohemoglobin 8.5 (H) 4.0 - 5.6 %    Est Avg Glucose 197 mg/dL   Lipid Profile (Lipid Panel) Fasting    Collection Time: 10/04/23  4:23 AM   Result Value Ref Range    Cholesterol,Tot 114 100 - 199 mg/dL    Triglycerides 124 0 - 149 mg/dL    HDL 26 (A) >=40 mg/dL    LDL 63 <100 mg/dL   Magnesium    Collection Time: 10/04/23  4:23 AM   Result Value Ref Range    Magnesium 1.8 1.5 - 2.5 mg/dL   PHOSPHORUS    Collection Time: 10/04/23  4:23 AM   Result Value Ref Range    Phosphorus  6.2 (H) 2.5 - 4.5 mg/dL   TROPONIN    Collection Time: 10/04/23  4:23 AM   Result Value Ref Range    Troponin T 31 (H) 6 - 19 ng/L   ESTIMATED GFR    Collection Time: 10/04/23  4:23 AM   Result Value Ref Range    GFR (CKD-EPI) 92 >60 mL/min/1.73 m 2   Comp Metabolic Panel    Collection Time: 10/04/23  1:22 PM   Result Value Ref Range    Sodium 137 135 - 145 mmol/L    Potassium 5.1 3.6 - 5.5 mmol/L    Chloride 94 (L) 96 - 112 mmol/L    Co2 34 (H) 20 - 33 mmol/L    Anion Gap 9.0 7.0 - 16.0    Glucose 233 (H) 65 - 99 mg/dL    Bun 42 (H) 8 - 22 mg/dL    Creatinine 1.01 0.50 - 1.40 mg/dL    Calcium 9.5 8.5 - 10.5 mg/dL    Correct Calcium 9.6 8.5 - 10.5 mg/dL    AST(SGOT) 22 12 - 45 U/L    ALT(SGPT) 101 (H) 2 - 50 U/L    Alkaline Phosphatase 69 30 - 99 U/L    Total Bilirubin 0.5 0.1 - 1.5 mg/dL    Albumin 3.9 3.2 - 4.9 g/dL    Total Protein 7.7 6.0 - 8.2 g/dL    Globulin 3.8 (H) 1.9 - 3.5 g/dL    A-G Ratio 1.0 g/dL   ESTIMATED GFR    Collection Time: 10/04/23  1:22 PM   Result Value Ref Range    GFR (CKD-EPI) 82 >60 mL/min/1.73 m 2   ABG - LAB    Collection Time: 10/04/23  1:23 PM   Result Value Ref Range    Ph 7.23 (LL) 7.40 - 7.50    Pco2 80.0 (HH) 26.0 - 37.0 mmHg    Po2 79.5 64.0 - 87.0 mmHg    O2 Saturation 93.8 93.0 - 99.0 %    Hco3 33 (H) 17 - 25 mmol/L    Base Excess 1 -4 - 3 mmol/L    Body Temp 36.7 Centigrade    O2 Therapy 6L     O2 Therapy 6.0 2.0 - 10.0 L/min    Ph -TC 7.23 (LL) 7.40 - 7.50    Pco2 -TC 79.0 (HH) 26.0 - 37.0 mmHg    Po2 -TC 77.9 64.0 - 87.0 mmHg   AMMONIA    Collection Time: 10/04/23  1:23 PM   Result Value Ref Range    Ammonia 34 11 - 45 umol/L       Imaging  CT-CEREBRAL PERFUSION ANALYSIS   Final Result      1.  Small focus of potentially reversible ischemia in the left parietal lobe.      CT-HEAD W/O   Final Result      No acute intracranial abnormality.                  CT-CTA CHEST PULMONARY ARTERY W/ RECONS   Final Result         1.  No large central pulmonary embolus is appreciated,  evaluation of the subsegmental branches is essentially nondiagnostic due to motion artifacts. Additional imaging would be required for definitive exclusion of small distal pulmonary emboli.   2.  Masslike consolidation in the right lower lobe, could represent round atelectasis or infiltrate however appearance is concerning for pulmonary mass. Should be considered neoplastic less frequent otherwise.   3.  Hazy right lower lobe opacities suggests component of superimposed infiltrate.   4.  Trace right pleural effusion.   5.  Mediastinal and right hilar lymph nodes, appearance concerning for possible metastatic disease given pulmonary finding.   6.  Fluid in the left upper quadrant adjacent to the spleen, density most typical of ascites   7.  Irregular hepatic contour compatible with changes of cirrhosis.   8.  Atherosclerosis and atherosclerotic coronary artery disease.   9.  Pulmonary nodule, see nodule follow-up recommendations below.      Fleischner Society pulmonary nodule recommendations:   Low and High Risk: Consider CT at 3 months, PET/CT, or tissue sampling.      Low Risk - Minimal or absent history of smoking and of other known risk factors.      High Risk - History of smoking or of other known risk factors.      Note: These recommendations do not apply to lung cancer screening, patients with immunosuppression, or patients with known primary cancer.      Fleischner Society 2017 Guidelines for Management of Incidentally Detected Pulmonary Nodules in Adults         EC-ECHOCARDIOGRAM COMPLETE W/O CONT    (Results Pending)   US-PARACENTESIS, ABD WITH IMAGING    (Results Pending)   CT-CTA HEAD WITH & W/O-POST PROCESS    (Results Pending)   CT-CTA NECK WITH & W/O-POST PROCESSING    (Results Pending)   MR-BRAIN-W/O    (Results Pending)       Assessment/Plan  * Acute on chronic respiratory failure with hypoxia and hypercapnia (HCC)- (present on admission)  Assessment & Plan  Suspect he has some chronic hypoxia d/t  his polycythemia and chronic hypercarbia d/t his elevated bicarb at admission  Acute hypercarbia is the likely cause of his decreased LOC  bipap  Trend ABG  Cont diuretics and pneumonia treatment    Acute encephalopathy  Assessment & Plan  Most likely hypercarbia  UDS negative  Ammonia normal  Stroke work-up has been started, stat MRI ordered per neuro recommendations  Additional encephalopathy workup if fails to improve with bipap    Edema  Assessment & Plan  Pedal edema and abdominal distention are likely d/t CHF vs liver disease  TTE pending  Lasix 40 IV BID  Strict I&O    Pneumonia  Assessment & Plan  Unasyn, azithro  Viral testing negative    Pulmonary mass  Assessment & Plan  Tumor vs round atalectesis vs infection  Treat pneumonia  Recommend 4-6 week follow-up CT to eval the trajectory of this abnormality    Hypertension  Assessment & Plan  Amlodipine  On an unusual dosing of metoprolol. I'll switch his tartrate to succinate        Discussed patient condition and risk of morbidity and/or mortality with Hospitalist, RN, RT, and Patient.    The patient remains critically ill requiring NIPPV at high risk for requiring intubation.  Critical care time = 45 minutes in directly providing and coordinating critical care and extensive data review.  No time overlap and excludes procedures.

## 2023-10-04 NOTE — PROGRESS NOTES
[facility] Medicine Daily Progress Note    Date of Service  10/4/2023    Chief Complaint  Blair Celeste is a 68 y.o. male admitted 10/3/2023 with respiratory failure.     Hospital Course  Blair is a 68 y.o. male who presented 10/3/2023 with complaints of shortness of breath ongoing for the past week prior to admission.  Reports shortness of breath is worsened with physical exertion, associated with symptoms of cough and sputum production, nausea without vomiting, weight gain, orthopnea, and palpitations. Denies symptoms of fever, chills, chest pain, abdominal pain, dysuria, constipation, or diarrhea. Reports he normally does not require oxygen at baseline, denies sick contacts. States he is only on 3 medications for his T2DM and HTN which he is compliant with, unable to name antihyperglycemic medication. States he previously completed a CXR at [facility] for workup of his shortness of breath, was told he had new findings of a lung mass.      In the ER, VS temperature 97.2, , RR 22, 172/102, 90% on 6L O2 (74% on RA). CBC significant for hemoconcentration Hb 20.4. CHEM unremarkable. Troponin 26, BNP 2965. EtOH < 10.1. UDS and fentanyl negative. EKG showed sinus rhythm, age indeterminate anteroseptal infarct, QTc 483. CTA chest pulmonary artery negative for PE, presence of mass of right lower lobe representing infiltrate versus neoplasm, trace right pleural effusion. Received furosemide 80 mg IV, CTX 2 g IV, and azithromycin 500 mg in the ER.     Interval Problem Update  10/4 patient is new to me today, patient is very lethargic, patient was sleeping when I saw him, wife at bedside, I woke him up, he is oriented to self, he was able to recognize his wife but could not recall her name, patient initially with no focal deficit. I ordered stat ct head, abg, ammonia, paracentesis.     Around 20 min later I was call by nurse staff patient is more lethargic and now having focal weakness, I came back to  bedside to assess patient, RRT called, patient showing weakness on right hand initially but after a few minutes patient was able to grab my hand with normal power, patient is also able to move his legs, he remains confused, oriented to self only. Code stroke was called, I discussed with neurologist team, cta head and neck ordered.     Later I reviewed and interpreted abg, I discussed with critical care, plan to transfer to icu for bipap.  Discussed with neurologist patient with small defect on ct perfusion that could be artifact, stat MRI ordered.     I have discussed this patient's plan of care and discharge plan at IDT rounds today with Case Management, Nursing, Nursing leadership, and other members of the IDT team.    Consultants/Specialty  critical care and neurology    Code Status  Full Code    Disposition  The patient is not medically cleared for discharge to home or a post-acute facility.      I have placed the appropriate orders for post-discharge needs.    Review of Systems  Review of Systems   Unable to perform ROS: Critical illness        Physical Exam  Temp:  [36.2 °C (97.2 °F)-36.9 °C (98.4 °F)] 36.8 °C (98.2 °F)  Pulse:  [] 104  Resp:  [15-33] 33  BP: (110-182)/() 113/53  SpO2:  [89 %-95 %] 93 %    Physical Exam  Vitals and nursing note reviewed.   Constitutional:       General: He is in acute distress.      Appearance: He is ill-appearing.      Comments: lethargic   HENT:      Head: Normocephalic and atraumatic.      Mouth/Throat:      Pharynx: No oropharyngeal exudate.   Eyes:      General: No scleral icterus.        Right eye: No discharge.         Left eye: No discharge.      Conjunctiva/sclera: Conjunctivae normal.   Cardiovascular:      Rate and Rhythm: Normal rate and regular rhythm.      Pulses: Normal pulses.      Heart sounds: Normal heart sounds.   Pulmonary:      Effort: Pulmonary effort is normal. No respiratory distress.      Breath sounds: Normal breath sounds. No wheezing.    Abdominal:      General: Bowel sounds are normal. There is distension.      Tenderness: There is no abdominal tenderness. There is no guarding.   Musculoskeletal:         General: Normal range of motion.      Cervical back: Normal range of motion and neck supple.      Right lower leg: Edema present.      Left lower leg: Edema present.   Skin:     General: Skin is warm.      Capillary Refill: Capillary refill takes 2 to 3 seconds.      Findings: No lesion.   Neurological:      Mental Status: He is disoriented.      Cranial Nerves: No cranial nerve deficit.   Psychiatric:         Mood and Affect: Mood normal.         Behavior: Behavior normal.         Fluids    Intake/Output Summary (Last 24 hours) at 10/4/2023 1523  Last data filed at 10/4/2023 1223  Gross per 24 hour   Intake 360 ml   Output 1100 ml   Net -740 ml       Laboratory  Recent Labs     10/03/23  2127 10/04/23  0423   WBC 9.8 11.8*   RBC 6.54* 6.40*   HEMOGLOBIN 20.4* 19.3*   HEMATOCRIT 63.0* 62.4*   MCV 96.3 97.5   MCH 31.2 30.2   MCHC 32.4 30.9*   RDW 58.0* 58.7*   PLATELETCT 279 274   MPV 9.6 9.5     Recent Labs     10/03/23  2127 10/04/23  0423 10/04/23  1322   SODIUM 138 140 137   POTASSIUM 5.1 5.2 5.1   CHLORIDE 100 98 94*   CO2 28 32 34*   GLUCOSE 163* 152* 233*   BUN 47* 43* 42*   CREATININE 0.87 0.92 1.01   CALCIUM 9.7 9.5 9.5             Recent Labs     10/04/23  0423   TRIGLYCERIDE 124   HDL 26*   LDL 63       Imaging  EC-ECHOCARDIOGRAM COMPLETE W/O CONT   Final Result      CT-CTA NECK WITH & W/O-POST PROCESSING   Final Result      Atherosclerosis of the carotid bifurcations and bulbs without hemodynamically significant stenosis.      Patent vertebral arteries.      CT-CTA HEAD WITH & W/O-POST PROCESS   Final Result      No large vessel occlusion or hemodynamically significant stenosis or aneurysm.      CT-CEREBRAL PERFUSION ANALYSIS   Final Result      1.  Small focus of potentially reversible ischemia in the left parietal lobe.      CT-HEAD  W/O   Final Result      No acute intracranial abnormality.                  CT-CTA CHEST PULMONARY ARTERY W/ RECONS   Final Result         1.  No large central pulmonary embolus is appreciated, evaluation of the subsegmental branches is essentially nondiagnostic due to motion artifacts. Additional imaging would be required for definitive exclusion of small distal pulmonary emboli.   2.  Masslike consolidation in the right lower lobe, could represent round atelectasis or infiltrate however appearance is concerning for pulmonary mass. Should be considered neoplastic less frequent otherwise.   3.  Hazy right lower lobe opacities suggests component of superimposed infiltrate.   4.  Trace right pleural effusion.   5.  Mediastinal and right hilar lymph nodes, appearance concerning for possible metastatic disease given pulmonary finding.   6.  Fluid in the left upper quadrant adjacent to the spleen, density most typical of ascites   7.  Irregular hepatic contour compatible with changes of cirrhosis.   8.  Atherosclerosis and atherosclerotic coronary artery disease.   9.  Pulmonary nodule, see nodule follow-up recommendations below.      Fleischner Society pulmonary nodule recommendations:   Low and High Risk: Consider CT at 3 months, PET/CT, or tissue sampling.      Low Risk - Minimal or absent history of smoking and of other known risk factors.      High Risk - History of smoking or of other known risk factors.      Note: These recommendations do not apply to lung cancer screening, patients with immunosuppression, or patients with known primary cancer.      Fleischner Society 2017 Guidelines for Management of Incidentally Detected Pulmonary Nodules in Adults         US-PARACENTESIS, ABD WITH IMAGING    (Results Pending)   MR-BRAIN-W/O    (Results Pending)        Assessment/Plan  * Acute on chronic respiratory failure with hypoxia and hypercapnia (HCC)- (present on admission)  Assessment & Plan  Suspect secondary to acute  CHF exacerbation and CAP in the setting of obesity and obesity hypoventilation syndrome.  90% on 6L NC, 74% on RA on admission.  Troponin 26, BNP 2965.   CTA chest pulmonary artery: Negative for PE, presence of mass of right lower lobe representing infiltrate versus neoplasm, trace right pleural effusion.   Received furosemide 80 mg IV, CTX 2 g IV, and azithromycin 500 mg in the ER.   -Furosemide 40 mg IV BID (home dose 40 mg PO daily)  -CTX 1 g IV daily x 5 days   -Azithromycin 500 mg daily x 3 days  -F/u respiratory panel  -F/u ECHO  -Telemetry  -Cardiac diet, 2 g sodium, 2000 ml fluid restriction  -Daily weights, I/Os  -RT protocol      I have ordered/reviewed/interpreted abg showing hyper capneic respiratory failure.   discussed with critical care  Patient to transfer to icu for higher level of care.     Acute encephalopathy  Assessment & Plan  CT head stat ordered  I have discussed with neurologist  I have ordered stat ABG, ammonia.   cta head and neck  Will require MRI brain when more stable.   Discussed with critical care patient will need to be transfer to ICU for BIPAP due to acute respiratory failure.     Edema  Assessment & Plan  Echo ordered  Continue iv lasix bid  Strict I&Os      Pneumonia  Assessment & Plan  Continue iv unasyn/azithro  Aspiration pna? I have asked speech to see patient.       Pulmonary mass  Assessment & Plan  cta chest reviewed  Will need repeat imagine if not improving will likely need biopsy.   Continue close monitoring      Hypertension  Assessment & Plan  -Furosemide 40 mg IV BID (home dose 40 mg PO daily)  -Continue home metoprolol tartrate 100 mg daily    I have added po amlodipine     Tachycardia  Assessment & Plan  Likely in the setting of acute hypoxic respiratory failure.  EKG: Sinus rhythm, age indeterminate anteroseptal infarct, QTc 483   -Continue home metoprolol tartrate 100 mg daily  -See assessment and plan for acute hypoxic respiratory failure    continue  monitoring           VTE prophylaxis: heparin.     I have performed a physical exam and reviewed and updated ROS and Plan today (10/4/2023). In review of yesterday's note (10/3/2023), there are no changes except as documented above.        Patient is critically ill, I have evaluated patient in telemetry floor.   The patient continues to have: increasing lethargy, confusion, off and on weakness.   The vital organ system that is affected is the: cns, respiratory, cardiovascular  If untreated there is a high chance of deterioration into: full respiratory failure, cardiac arrest and eventually death.   The critical care that I am providing today is: ordering stat CT head, ABG, ammonia, reviewing results, discussing with critical care, discussed with neurologist, discussing/arranging plan of care with RRT, bedside nurse, arranging transfer to ICU.   The critical that has been undertaken is medically complex.   There has been no overlap in critical care time.   Critical Care Time not including procedures: 44 minutes

## 2023-10-04 NOTE — ED NOTES
Rounded on patient, sat pt higher in bed HOB, due to pulse ox in the 87-88% on 6L. Pt is now 89-90% on 6L NC while sleeping

## 2023-10-04 NOTE — ED NOTES
450cc of urine voided, pt placed back on monitor. Urinal emptied and placed at bedside. Pt provided a pillow. Call light and belongings within reach.

## 2023-10-04 NOTE — PROGRESS NOTES
4 Eyes Skin Assessment Completed by TAHIR Monsivais and TAHIR Joyce.    Head: Facial Redness, Blanching  Ears Redness and Blanching  Nose WDL  Mouth WDL  Neck WDL  Breast/Chest WDL  Shoulder Blades WDL  Spine WDL  (R) Arm/Elbow/Hand Bruising  (L) Arm/Elbow/Hand Bruising  Abdomen Bruising  Groin WDL  Scrotum/Coccyx/Buttocks WDL  (R) Leg Redness, Scarring, Swelling, Edema  (L) Leg Redness, Scarring, Swelling, Edema  (R) Heel/Foot/Toe: Heel - Redness and Blanching  (L) Heel/Foot/Toe: Heel -  Redness and Blanching          Devices In Places Tele Box, Blood Pressure Cuff, Pulse Ox, and Nasal Cannula      Interventions In Place Gray Ear Foams, Pillows, and Pressure Redistribution Mattress    Possible Skin Injury No    Pictures Uploaded Into Epic N/A  Wound Consult Placed N/A  RN Wound Prevention Protocol Ordered No

## 2023-10-04 NOTE — HOSPITAL COURSE
Blair is a 68 y.o. male who presented 10/3/2023 with complaints of shortness of breath ongoing for the past week prior to admission.  Reports shortness of breath is worsened with physical exertion, associated with symptoms of cough and sputum production, nausea without vomiting, weight gain, orthopnea, and palpitations. Denies symptoms of fever, chills, chest pain, abdominal pain, dysuria, constipation, or diarrhea. Reports he normally does not require oxygen at baseline, denies sick contacts. States he is only on 3 medications for his T2DM and HTN which he is compliant with, unable to name antihyperglycemic medication. States he previously completed a CXR at [facility] for workup of his shortness of breath, was told he had new findings of a lung mass.      In the ER, VS temperature 97.2, , RR 22, 172/102, 90% on 6L O2 (74% on RA). CBC significant for hemoconcentration Hb 20.4. CHEM unremarkable. Troponin 26, BNP 2965. EtOH < 10.1. UDS and fentanyl negative. EKG showed sinus rhythm, age indeterminate anteroseptal infarct, QTc 483. CTA chest pulmonary artery negative for PE, presence of mass of right lower lobe representing infiltrate versus neoplasm, trace right pleural effusion. Received furosemide 80 mg IV, CTX 2 g IV, and azithromycin 500 mg in the ER.

## 2023-10-04 NOTE — ED NOTES
Med Rec PARTIAL per Pt at bedside. Pt states he takes another med for BP, unable to verify med and strengths at this time.   Allergies reviewed.  Home Pharmacy:  [facility]    Pt does not take any anticoagulants.

## 2023-10-04 NOTE — CONSULTS
Neurology STROKE CODE H&P  Neurohospitalist Service, [facility]    Referring Physician: AMPARO Delgado*    STROKE CODE:   Chief Complaint   Patient presents with    Shortness of Breath       To obtain the most accurate data regarding the time called, and time patient seen, refer to the stroke run-sheet and chart.  For time of CT, refer to the radiology report. See A&P below for TPA Decision and door to needle time if and when applicable.    HPI: Blair Celeste is a 68 y.o. male with a PMHx of COPD, Diabetes, HTN, and Tobacco abuse who initially presented on 10/03/2023 with shortness of breath worsening over the last week. Rapid Response was called for initial concerns of seizure like activity with a waxing and waning neurological exam. A stroke alert was activated as the patient became less responsive and weaker on the left upper extremity with a last known well of approximately 1220. Per nursing the patient had been able to ambulate to the bathroom prior to this acute change in mental status. Noncontrast CT head was negative for acute findings. CTA head/neck negative for LVO and critical flow limiting stenoses. CT perfusion revealed a possible small area of CBF defect in the right parietal lobe, although this does not correlate to the patient's symptoms. Neurology has been consulted for further evaluation of the above.     Review of systems: In addition to what is detailed in the HPI above, all other systems reviewed and are negative.    Past Medical History:    has a past medical history of COPD (chronic obstructive pulmonary disease) (HCC), Diabetes (HCC), and Hypertension.    FHx:  family history is not on file.    SHx:   reports that he quit smoking about 23 years ago. His smoking use included cigars. He started smoking about 51 years ago. He has never used smokeless tobacco. He reports that he does not currently use alcohol. He reports that he does not use  drugs.    Allergies:  No Known Allergies    Medications:    Current Facility-Administered Medications:     senna-docusate (Pericolace Or Senokot S) 8.6-50 MG per tablet 2 Tablet, 2 Tablet, Oral, BID, 2 Tablet at 10/04/23 0551 **AND** polyethylene glycol/lytes (Miralax) PACKET 1 Packet, 1 Packet, Oral, QDAY PRN **AND** magnesium hydroxide (Milk Of Magnesia) suspension 30 mL, 30 mL, Oral, QDAY PRN **AND** bisacodyl (Dulcolax) suppository 10 mg, 10 mg, Rectal, QDAY PRN, Radha Brooks M.D.    heparin injection 5,000 Units, 5,000 Units, Subcutaneous, Q8HRS, Radha Brooks M.D., 5,000 Units at 10/04/23 0551    acetaminophen (Tylenol) tablet 650 mg, 650 mg, Oral, Q6HRS PRN, Radha Brooks M.D.    furosemide (Lasix) injection 40 mg, 40 mg, Intravenous, BID DIURETIC, Radha Brooks M.D., 40 mg at 10/04/23 0453    azithromycin (Zithromax) tablet 500 mg, 500 mg, Oral, DAILY, Radha Brooks M.D.    Respiratory Therapy Consult, , Nebulization, Continuous RT, Radha Brooks M.D.    metoprolol tartrate (Lopressor) tablet 100 mg, 100 mg, Oral, DAILY, Radha Brooks M.D., 100 mg at 10/04/23 0453    ampicillin/sulbactam (Unasyn) 3 g in  mL IVPB, 3 g, Intravenous, Q6HRS, Lloyd Quevedo M.D.    amLODIPine (Norvasc) tablet 5 mg, 5 mg, Oral, Q DAY, Lloyd Quevedo M.D.    Physical Examination:    Vitals:    10/04/23 1310 10/04/23 1321 10/04/23 1322 10/04/23 1419   BP: (!) 147/114 (!) 147/100 (!) 172/111 110/51   Pulse: 91 81 99 98   Resp: 16 20  (!) 22   Temp:       TempSrc:       SpO2: 92% 93%     Weight:       Height:             General: Patient is lethargic, arouses to voice  Eye: Examination of optic disks not indicated at this time given acuity of consult  Neck: There is normal range of motion  CV: Regular rate   Extremities:  Clear, dry, intact, without peripheral edema    NEUROLOGICAL EXAM:     Mental status: Lethargic, arouses to voice. Oriented to self only  Speech and language: Speech is clear and  fluent. The patient is able to follow commands with repeat prompting  Cranial nerve exam: Pupils are equal, round and reactive to light bilaterally. Visual fields are full. There is no nystagmus. Extraocular muscles are intact. Face is symmetric. Sensation in the face is intact to light touch. Palate elevates symmetrically. Tongue is midline.  Motor exam: There is sustained antigravity with no downward drift in bilateral arms, left arm appears slightly weaker than right. Minimal effort in bilateral lower extremity exam, although patient did lift left leg antigravity once with repeat prompting  Sensory exam:  Reacts to tactile in all 4 distal extremities, there is no neglect to double stim.  Deep tendon reflexes:  2+ throughout. Toes down-going bilaterally.  Coordination: Non participatory  Gait: Deferred due to patient preference.    NIHSS: National Institutes of Health Stroke Scale    [1] 1a:Level of Consciousness    0-alert 1-drowsy   2-stupor   3-coma  [1] 1b:LOC Questions                  0-both  1-one      2-neither  [0] 1c:LOC Commands                   0-both  1-one      2-neither  [0] 2: Best Gaze                     0-nl    1-partial  2-forced  [0] 3: Visual Fields                   0-nl    1-partial  2-complete 3-bilat  [0] 4: Facial Paresis                0-nl    1-minor    2-partial  3-full  MOTOR                       0-nl  [0] 5: Right Arm           1-drift  [1] 6: Left Arm             2-some effort vs gravity  [2] 7: Right Leg           3-no effort vs gravity  [2] 8: Left Leg             4-no movement                             x-untestable  [0] 9: Limb Ataxia                    0-abs   1-1_limb   2-2+_limbs       x-untestable  [0] 10:Sensory                        0-nl    1-partial  2-dense  [1] 11:Best Language/Aphasia         0-nl    1-mild/mod 2-severe   3-mute  [0] 12:Dysarthria                     0-nl    1-mild/mod 2-severe       x-untestable  [0] 13:Neglect/Inattention            0-none   "1-partial  2-complete  [8] TOTAL    Baseline Modified [place] Scale (MRS): 4 = Moderately severe disability; unable to walk without assistance and unable to attend to own bodily needs without assistance    Objective Data:    Labs:  No results found for: \"PROTHROMBTM\", \"INR\"   Lab Results   Component Value Date/Time    WBC 11.8 (H) 10/04/2023 04:23 AM    RBC 6.40 (H) 10/04/2023 04:23 AM    HEMOGLOBIN 19.3 (H) 10/04/2023 04:23 AM    HEMATOCRIT 62.4 (H) 10/04/2023 04:23 AM    MCV 97.5 10/04/2023 04:23 AM    MCH 30.2 10/04/2023 04:23 AM    MCHC 30.9 (L) 10/04/2023 04:23 AM    MPV 9.5 10/04/2023 04:23 AM    NEUTSPOLYS 73.70 (H) 10/04/2023 04:23 AM    LYMPHOCYTES 12.60 (L) 10/04/2023 04:23 AM    MONOCYTES 11.80 10/04/2023 04:23 AM    EOSINOPHILS 0.90 10/04/2023 04:23 AM    BASOPHILS 0.30 10/04/2023 04:23 AM      Lab Results   Component Value Date/Time    SODIUM 137 10/04/2023 01:22 PM    POTASSIUM 5.1 10/04/2023 01:22 PM    CHLORIDE 94 (L) 10/04/2023 01:22 PM    CO2 34 (H) 10/04/2023 01:22 PM    GLUCOSE 233 (H) 10/04/2023 01:22 PM    BUN 42 (H) 10/04/2023 01:22 PM    CREATININE 1.01 10/04/2023 01:22 PM      Lab Results   Component Value Date/Time    CHOLSTRLTOT 114 10/04/2023 04:23 AM    LDL 63 10/04/2023 04:23 AM    HDL 26 (A) 10/04/2023 04:23 AM    TRIGLYCERIDE 124 10/04/2023 04:23 AM       Lab Results   Component Value Date/Time    ALKPHOSPHAT 69 10/04/2023 01:22 PM    ASTSGOT 22 10/04/2023 01:22 PM    ALTSGPT 101 (H) 10/04/2023 01:22 PM    TBILIRUBIN 0.5 10/04/2023 01:22 PM        Imaging/Testing:    I interpreted and/or reviewed the patient's neuroimaging    CT-HEAD W/O   Final Result      No acute intracranial abnormality.                  CT-CTA CHEST PULMONARY ARTERY W/ RECONS   Final Result         1.  No large central pulmonary embolus is appreciated, evaluation of the subsegmental branches is essentially nondiagnostic due to motion artifacts. Additional imaging would be required for definitive exclusion of small " distal pulmonary emboli.   2.  Masslike consolidation in the right lower lobe, could represent round atelectasis or infiltrate however appearance is concerning for pulmonary mass. Should be considered neoplastic less frequent otherwise.   3.  Hazy right lower lobe opacities suggests component of superimposed infiltrate.   4.  Trace right pleural effusion.   5.  Mediastinal and right hilar lymph nodes, appearance concerning for possible metastatic disease given pulmonary finding.   6.  Fluid in the left upper quadrant adjacent to the spleen, density most typical of ascites   7.  Irregular hepatic contour compatible with changes of cirrhosis.   8.  Atherosclerosis and atherosclerotic coronary artery disease.   9.  Pulmonary nodule, see nodule follow-up recommendations below.      Fleischner Society pulmonary nodule recommendations:   Low and High Risk: Consider CT at 3 months, PET/CT, or tissue sampling.      Low Risk - Minimal or absent history of smoking and of other known risk factors.      High Risk - History of smoking or of other known risk factors.      Note: These recommendations do not apply to lung cancer screening, patients with immunosuppression, or patients with known primary cancer.      Fleischner Society 2017 Guidelines for Management of Incidentally Detected Pulmonary Nodules in Adults         EC-ECHOCARDIOGRAM COMPLETE W/O CONT    (Results Pending)   US-PARACENTESIS, ABD WITH IMAGING    (Results Pending)   CT-CTA HEAD WITH & W/O-POST PROCESS    (Results Pending)   CT-CTA NECK WITH & W/O-POST PROCESSING    (Results Pending)   CT-CEREBRAL PERFUSION ANALYSIS    (Results Pending)       Assessment and Plan:    68 y.o. male initially presenting for hypoxic respiratory failure in setting of chronic COPD for whom a stroke alert was activated for altered mental status and possible left sided weakness. Stroke Protocol CT head negative for acute intracranial abnormalities. CTA head/neck negative for LVO,  dissection, or flow limiting stenosis. CT perfusion revealed a right parietal CBF defect that may be artifact as this does not clinically correlate to the patient's symptoms. Upon further examination the patient was found to be profoundly hypercapnic with a CO2 of 79. Low suspicion for ischemic process at this time but cannot definitively rule out stroke. Due to inconsistent exam with acid/base imbalance the patient was determined to not be a candidate for acute neurological interventions. Recommend further metabolic work up per primary team as well as STAT MRI brain without contrast for further evaluation. Please reconsult Vascular Neurology if MRI results positive for acute findings.    Case reviewed and plan created with Dr. Sukhi Marquez, Acute Neurology. Please call with any questions.    Guillermo IGLESIAS  Vascular Neurology, Acute Care Services

## 2023-10-04 NOTE — ASSESSMENT & PLAN NOTE
Echocardiogram with enlarged RV with decreased RV systolic function  Force diuresis with furosemide   current/ 40yrs ago

## 2023-10-04 NOTE — PROGRESS NOTES
Monitor Summary     Rhythm: ST, 5 beats Vtach up to 170 @ 0255  Heart Rate: 109  Ectopy: none  Measurement: .19/.08/.23

## 2023-10-04 NOTE — DIETARY
NUTRITION SERVICES: BMI - Pt with BMI >40 (=Body mass index is 43.34 kg/m².), Class III obesity. Weight loss counseling not appropriate in acute care setting. RECOMMEND - If appropriate at DC please refer to outpatient nutrition services for weight management.      RD available PRN

## 2023-10-04 NOTE — ASSESSMENT & PLAN NOTE
In setting of untreated RAYMOND and pneumonia with pulmonary hypertension  Improving, decrease oxygen requirement  Lasix as needed, finish course of antibiotics

## 2023-10-04 NOTE — ASSESSMENT & PLAN NOTE
Goal SBP less than 160  Increase amlodipine, 10 mg daily  Continue metoprolol succinate, 100 mg daily

## 2023-10-04 NOTE — ED PROVIDER NOTES
"ED Provider Note    Scribed for Bertin Rosenthal by Dave Espitia. 10/3/2023  9:11 PM    Primary care provider: None noted  Means of arrival: EMS  History obtained from: Patient  History limited by: None    CHIEF COMPLAINT  Chief Complaint   Patient presents with    Shortness of Breath     EXTERNAL RECORDS REVIEWED  External ED Note Unavailable, external facility did not send them over    HPI/ROS    LIMITATION TO HISTORY   Select: : None    HPI  Blair Celeste is a 68 y.o. male who presents to the Emergency Department for shortness of breath onset 3 weeks ago. He was seen at [facility] for this, has chronic shortness of breath from his weight but it has been getting worse. He endorses abdominal distention from drinking, he is a fairly chronic drinker, last drink was 3 days ago. A couple weeks ago he did not drink for about 4-6 weeks and was feeling much better, but began drinking again recently. He endorses coughing up phlegm, he notes that every once in a while he would have \"pink tinged\" phlegm. He currently takes furosemide for water retention, this has been a problem for the past 2 years. He denies any history of paracentesis. Blood work and imaging was performed at [place], doctor was concerned for possible pneumonia or cancer in the right lung, had him sent here. He has not required supplemental oxygen in the past, was noted to be hypoxic in triage and placed on 6 L.     REVIEW OF SYSTEMS  As above, all other systems reviewed and are negative.   See HPI for further details.     PAST MEDICAL HISTORY   None noted    SURGICAL HISTORY  patient denies any surgical history    SOCIAL HISTORY      Social History     Substance and Sexual Activity   Drug Use Not on file     FAMILY HISTORY  None noted    CURRENT MEDICATIONS  No current outpatient medications     ALLERGIES  No Known Allergies    PHYSICAL EXAM    VITAL SIGNS:   Vitals:    10/03/23 2301 10/03/23 2308 10/03/23 2331 10/03/23 2349 "   BP: (!) 158/95  (!) 145/82    Pulse: 100  (!) 102 (!) 102   Resp: 20  (!) 22    Temp:       TempSrc:       SpO2:  90% 91% 90%   Weight:       Height:         Vitals: My interpretation: hypertensive, not tachycardic, afebrile, not hypoxic    Reinterpretation of vitals: Improved    Cardiac Monitor Interpretation: The cardiac monitor revealed normal Sinus Rhythm as interpreted by me. The cardiac monitor was ordered secondary to the patient's history of shortness of breath and to monitor for dysrhythmia and/or tachycardia.    PE:   Gen: sitting comfortably, speaking clearly, appears in no acute distress   ENT: Mucous membranes moist, posterior pharynx clear, uvula midline, nares patent bilaterally   Neck: Supple, FROM  Pulmonary: Lungs are clear to auscultation bilaterally. No tachypnea  CV:  RRR, no murmur appreciated, pulses 2+ in both upper and lower extremities  Abdomen: soft, NT/ND; no rebound/guarding  : no CVA or suprapubic tenderness   Ext: 2+ pitting edema to mid-shin  Neuro: A&Ox4 (person, place, time, situation), speech fluent, gait steady, no focal deficits appreciated  Skin: No rash or lesions.  No pallor or jaundice.  No cyanosis.  Warm and dry.     DIAGNOSTIC STUDIES / PROCEDURES    LABS  Results for orders placed or performed during the hospital encounter of 10/03/23   CBC WITH DIFFERENTIAL   Result Value Ref Range    WBC 9.8 4.8 - 10.8 K/uL    RBC 6.54 (H) 4.70 - 6.10 M/uL    Hemoglobin 20.4 (H) 14.0 - 18.0 g/dL    Hematocrit 63.0 (H) 42.0 - 52.0 %    MCV 96.3 81.4 - 97.8 fL    MCH 31.2 27.0 - 33.0 pg    MCHC 32.4 32.3 - 36.5 g/dL    RDW 58.0 (H) 35.9 - 50.0 fL    Platelet Count 279 164 - 446 K/uL    MPV 9.6 9.0 - 12.9 fL    Neutrophils-Polys 69.80 44.00 - 72.00 %    Lymphocytes 15.20 (L) 22.00 - 41.00 %    Monocytes 13.10 0.00 - 13.40 %    Eosinophils 1.00 0.00 - 6.90 %    Basophils 0.40 0.00 - 1.80 %    Immature Granulocytes 0.50 0.00 - 0.90 %    Nucleated RBC 0.20 0.00 - 0.20 /100 WBC     Neutrophils (Absolute) 6.82 1.82 - 7.42 K/uL    Lymphs (Absolute) 1.49 1.00 - 4.80 K/uL    Monos (Absolute) 1.28 (H) 0.00 - 0.85 K/uL    Eos (Absolute) 0.10 0.00 - 0.51 K/uL    Baso (Absolute) 0.04 0.00 - 0.12 K/uL    Immature Granulocytes (abs) 0.05 0.00 - 0.11 K/uL    NRBC (Absolute) 0.02 K/uL   COMP METABOLIC PANEL   Result Value Ref Range    Sodium 138 135 - 145 mmol/L    Potassium 5.1 3.6 - 5.5 mmol/L    Chloride 100 96 - 112 mmol/L    Co2 28 20 - 33 mmol/L    Anion Gap 10.0 7.0 - 16.0    Glucose 163 (H) 65 - 99 mg/dL    Bun 47 (H) 8 - 22 mg/dL    Creatinine 0.87 0.50 - 1.40 mg/dL    Calcium 9.7 8.5 - 10.5 mg/dL    Correct Calcium 10.1 8.5 - 10.5 mg/dL    AST(SGOT) 34 12 - 45 U/L    ALT(SGPT) 125 (H) 2 - 50 U/L    Alkaline Phosphatase 71 30 - 99 U/L    Total Bilirubin 0.4 0.1 - 1.5 mg/dL    Albumin 3.5 3.2 - 4.9 g/dL    Total Protein 7.3 6.0 - 8.2 g/dL    Globulin 3.8 (H) 1.9 - 3.5 g/dL    A-G Ratio 0.9 g/dL   TROPONIN   Result Value Ref Range    Troponin T 26 (H) 6 - 19 ng/L   proBrain Natriuretic Peptide, NT   Result Value Ref Range    NT-proBNP 2965 (H) 0 - 125 pg/mL   DIAGNOSTIC ALCOHOL   Result Value Ref Range    Diagnostic Alcohol <10.1 <10.1 mg/dL   URINE DRUG SCREEN   Result Value Ref Range    Amphetamines Urine Negative Negative    Barbiturates Negative Negative    Benzodiazepines Negative Negative    Cocaine Metabolite Negative Negative    Fentanyl, Urine Negative Negative    Methadone Negative Negative    Opiates Negative Negative    Oxycodone Negative Negative    Phencyclidine -Pcp Negative Negative    Propoxyphene Negative Negative    Cannabinoid Metab Negative Negative   LACTIC ACID   Result Value Ref Range    Lactic Acid 1.5 0.5 - 2.0 mmol/L   ESTIMATED GFR   Result Value Ref Range    GFR (CKD-EPI) 95 >60 mL/min/1.73 m 2   EKG   Result Value Ref Range    Report       [facility]    Test Date:  2023-10-03  Pt Name:    DEBBI REYES                  Department:  ER  MRN:        [id number]                      Room:  Gender:     Male                         Technician: [id number]  :        1955                   Requested By:ER TRIAGE PROTOCOL  Order #:    [id number]                    Reading MD: Bertin Rosenthal    Measurements  Intervals                                Axis  Rate:       96                           P:          20  AK:         179                          QRS:        -75  QRSD:       101                          T:          -43  QT:         355  QTc:        449    Interpretive Statements  Sinus rhythm  Probable left atrial enlargement  Inferior infarct, age indeterminate  Anterior infarct, age indeterminate  No previous ECG available for comparison  Electronically Signed On 10- 22:21:01 PDT by Bertin Rosenthal     EKG   Result Value Ref Range    Report       [facility] Emergency Dept.    Test Date:  2023-10-03  Pt Name:    DEBBI REYES                  Department: ER  MRN:        [id number]                      Room:       RD 12  Gender:     Male                         Technician: [id number]  :        1955                   Requested By:ER TRIAGE PROTOCOL  Order #:    [id number]                    Reading MD: Bertin Rosenthal    Measurements  Intervals                                Axis  Rate:       99                           P:          45  AK:         182                          QRS:        -84  QRSD:       87                           T:          -44  QT:         376  QTc:        483    Interpretive Statements  Sinus rhythm  Probable left atrial enlargement  S1,S2,S3 pattern  Anteroseptal infarct, age indeterminate  Compared to ECG 10/03/2023 20:43:00  No significant changes  Electronically Signed On 10- 22:20:59 PDT by Bertin Rosenthal        All labs reviewed by me. Labs were compared to prior labs if they were available. Significant for no leukocytosis, no anemia, mild hemoconcentration, normal electrolytes,  mild hyperglycemia, normal renal function, normal liver enzymes, normal bilirubin, troponin minimally elevated, BNP elevated at 3000, alcohol negative, lactic acid normal.    RADIOLOGY  I have independently interpreted the diagnostic imaging associated with this visit and am waiting the final reading from the radiologist.   My preliminary interpretation is a follows: Large right upper lobe mass versus pneumonia, no signs of pulmonary embolism  Radiologist interpretation is as follows:  CT-CTA CHEST PULMONARY ARTERY W/ RECONS   Final Result         1.  No large central pulmonary embolus is appreciated, evaluation of the subsegmental branches is essentially nondiagnostic due to motion artifacts. Additional imaging would be required for definitive exclusion of small distal pulmonary emboli.   2.  Masslike consolidation in the right lower lobe, could represent round atelectasis or infiltrate however appearance is concerning for pulmonary mass. Should be considered neoplastic less frequent otherwise.   3.  Hazy right lower lobe opacities suggests component of superimposed infiltrate.   4.  Trace right pleural effusion.   5.  Mediastinal and right hilar lymph nodes, appearance concerning for possible metastatic disease given pulmonary finding.   6.  Fluid in the left upper quadrant adjacent to the spleen, density most typical of ascites   7.  Irregular hepatic contour compatible with changes of cirrhosis.   8.  Atherosclerosis and atherosclerotic coronary artery disease.   9.  Pulmonary nodule, see nodule follow-up recommendations below.      Fleischner Society pulmonary nodule recommendations:   Low and High Risk: Consider CT at 3 months, PET/CT, or tissue sampling.      Low Risk - Minimal or absent history of smoking and of other known risk factors.      High Risk - History of smoking or of other known risk factors.      Note: These recommendations do not apply to lung cancer screening, patients with immunosuppression,  or patients with known primary cancer.      Fleischner Society 2017 Guidelines for Management of Incidentally Detected Pulmonary Nodules in Adults           COURSE & MEDICAL DECISION MAKING  Nursing notes, VS, PMSFHx, labs, imaging, EKG reviewed in chart.    Heart Score: Moderate    ED Observation Status? No, this patient does not meet criteria for ED Observation    Ddx: Pneumonia, malignancy, PE, CHF exacerbation    MDM: 9:11 PM Blair Celeste is a 68 y.o. male who presented with history of heavy alcohol abuse, obesity, who presented to [facility] earlier today with hypoxia, progressively worsening shortness of breath over the last few weeks, and lower extremity edema that is acute on chronic.  He does take oral Lasix.  He arrives here with his wife who helps provide collateral formation.  They did a work-up there that was concerning for a mass in the right upper lobe versus pneumonia and he was sent here.  Patient refused an ambulance, the wife had oxygen in their car as her son is a special needs patient and he used this to transfer here, upon arrival here he is found to be hypoxic to 74% and improved on 6 L.  The rest of his vitals show mild hypertension but otherwise unremarkable.  He has had some occasional pink-tinged phlegm but review of systems is otherwise fairly negative he denies chest pain.  Stat EKG done in triage was negative for any ischemic changes.  Upon my evaluation patient is morbidly obese, stabilized on 6 L nasal cannula oxygen which he does not normally wear, has 2+ pitting edema up to the midshin.  No active respiratory distress at this time.  Patient will undergo labs and CTA PE study to rule out pulmonary embolism in the setting of probable hemoptysis, new hypoxia.  All labs reviewed by me. Labs were compared to prior labs if they were available. Significant for no leukocytosis, no anemia, mild hemoconcentration, normal electrolytes, mild hyperglycemia, normal renal  function, normal liver enzymes, normal bilirubin, troponin minimally elevated, BNP elevated at 3000, alcohol negative, lactic acid normal.  Patient treated with 80 mg IV Lasix.  CTA study shows large masslike consolidation with possible superimposed infectious process and lymphadenopathy concerning for metastatic disease.  Plan will be to admit patient for hypoxia, antibiotics for possible community-acquired pneumonia, continued monitoring and treatment here, following up blood cultures, and continue supplemental oxygen as patient is hypoxic, on 6 L currently, and hopefully better characterization of lung mass.  Patient and wife are amenable to plan for admission, wife at bedside helps provide collateral formation.  Patient critically ill but stable and improved at time of admission.  Not appropriate for 30 cc/kg bolus of IV fluids due to fluid overload and bilateral lower extremity edema.    ADDITIONAL PROBLEM LIST AND DISPOSITION    I have discussed management of the patient with the following physicians and ANNELIESE's: Hospitalist    Discussion of management with other QHP or appropriate source(s): None     Barriers to care at this time, including but not limited to: None     Decision tools and prescription drugs considered including, but not limited to: Antibiotics ceftriaxone and Zithromax .    CRITICAL CARE TIME 37 minutes: Acute hypoxic respiratory distress with oxygen saturation of 70% reported as an outpatient and hypoxic here upon arrival requiring frequent reevaluations and monitoring  There was a very real possibility of deterioration of the patient's condition.  This patient required the highest level of care.  I provided critical care services which included: review of the medical record, treatment orders, ordering and reviewing test results, frequent reevaluation of the patient's condition and response to treatment, as well as discussing the case with appropriate personnel and various consultants. The  critical care time associated with the care of this patient is exclusive of any procedures or specific interventions.     FINAL IMPRESSION  1. Hypoxia Acute   2. Dyspnea, unspecified type Acute   3. At risk for fluid volume overload Acute   4. Acute on chronic congestive heart failure, unspecified heart failure type (HCC) Acute   5. Lower extremity edema Acute   6. Lung mass Acute   7. Community acquired pneumonia of right upper lobe of lung Acute       Dave WELLS (Scribe), am scribing for, and in the presence of, Bertin Rosenthal.    Electronically signed by: Dave Espitia (Scribe), 10/3/2023    IBertin personally performed the services described in this documentation, as scribed by Dave Espitia in my presence, and it is both accurate and complete.    The note accurately reflects work and decisions made by me.  Bertin Rosenthal  10/3/2023  10:28 PM

## 2023-10-04 NOTE — ED NOTES
Bedside report received from Phyllis HARO, assumed care of patient.  POC discussed with patient. Call light within reach, all needs addressed at this time.   Patient medicated per MAR. UA collected and sent. Patient to CT.  Fall risk interventions in place: Move the patient closer to the nurse's station, Patient's personal possessions are with in their safe reach, Place socks on patient, Place fall risk sign on patient's door, Give patient urinal if applicable, Keep floor surfaces clean and dry, and Accompanied to restroom (all applicable per [place] Fall risk assessment)   Continuous monitoring: Cardiac Leads, Pulse Ox, or Blood Pressure  IVF/IV medications: Not Applicable   Oxygen: 6L of oxygen NC, oxygen line traced to oxygen wall.   Bedside sitter: Not Applicable   Isolation: Not Applicable

## 2023-10-04 NOTE — ED TRIAGE NOTES
Pt presents to the ED for shortness of breath x3 weeks. Pt does not normally wear home O2. Pt was placed on 6LNC in the waiting room for an SpO2 of 76%. Pt was seen at [facility] today for SOB and coughing up pink tinged blood. No audible wheezing at this time. Pt admits to frequent ETOH use and is concerned about possible liver problems. Pt is Aox4. Wife at side.     ECG complete.

## 2023-10-04 NOTE — ASSESSMENT & PLAN NOTE
cta chest reviewed  Pulm evaluated the patient and needs to follow-up with CT scan in couple weeks after discharge.

## 2023-10-04 NOTE — ASSESSMENT & PLAN NOTE
TTE showing LVEF 75%, calcified aortic valve uable to asses degree of stenosis, decreased R heart systolic function  Continue beta-blockers and losartan  Lasix as needed

## 2023-10-04 NOTE — ASSESSMENT & PLAN NOTE
CT with right lower lobe masslike opacification  Differential diagnosis includes pneumonia, malignancy, rounded atelectasis  I am highly suspicious of malignancy with his history of hemoptysis and tobacco abuse  He requires bronchoscopy with biopsy when clinically improved

## 2023-10-04 NOTE — PROGRESS NOTES
Critical lab values reported to Dr. Quevedo CO2 level 80, Ph 7.23 at 1400. ICU transfer in place. Awaiting a bed placement.

## 2023-10-04 NOTE — ED NOTES
Pt to T834/02 via Maria Del Carmen HARO. Pt with all belongings in possession. Pt is on cardiac monitoring and 6L of oxygen NC. Report has been given.

## 2023-10-04 NOTE — H&P
History & Physical Note    Date of Admission: 10/4/2023  Admission Status: Inpatient  [facility] Team: DELANO  Attending: Avery Guthrie M.D.   Senior Resident: Dr. Radha Brooks  Contact Number: [phone]    Chief Complaint: Shortness of breath     History of Present Illness (HPI):  Blair is a 68 y.o. male who presented 10/3/2023 with complaints of shortness of breath ongoing for the past week prior to admission.  Reports shortness of breath is worsened with physical exertion, associated with symptoms of cough and sputum production, nausea without vomiting, weight gain, orthopnea, and palpitations. Denies symptoms of fever, chills, chest pain, abdominal pain, dysuria, constipation, or diarrhea. Reports he normally does not require oxygen at baseline, denies sick contacts. States he is only on 3 medications for his T2DM and HTN which he is compliant with, unable to name antihyperglycemic medication. States he previously completed a CXR at [facility] for workup of his shortness of breath, was told he had new findings of a lung mass.     In the ER, VS temperature 97.2, , RR 22, 172/102, 90% on 6L O2 (74% on RA). CBC significant for hemoconcentration Hb 20.4. CHEM unremarkable. Troponin 26, BNP 2965. EtOH < 10.1. UDS and fentanyl negative. EKG showed sinus rhythm, age indeterminate anteroseptal infarct, QTc 483. CTA chest pulmonary artery negative for PE, presence of mass of right lower lobe representing infiltrate versus neoplasm, trace right pleural effusion. Received furosemide 80 mg IV, CTX 2 g IV, and azithromycin 500 mg in the ER.     Review of Systems:  Review of Systems   Constitutional:  Negative for chills and fever.   HENT:  Negative for congestion and sore throat.    Eyes:  Negative for blurred vision and double vision.   Respiratory:  Positive for cough, sputum production and shortness of breath.    Cardiovascular:  Positive for palpitations, orthopnea and leg swelling. Negative for chest  pain.   Gastrointestinal:  Positive for nausea. Negative for abdominal pain, constipation, diarrhea and vomiting.   Genitourinary:  Negative for dysuria, frequency and urgency.   Skin:  Negative for itching and rash.   Neurological:  Negative for dizziness, weakness and headaches.   Psychiatric/Behavioral:  Negative for substance abuse.        Past Medical History:   Past Medical History was reviewed with patient.   has a past medical history of COPD (chronic obstructive pulmonary disease) ([facility]), Diabetes ([facility]), and Hypertension.    Past Surgical History: Past Surgical History was reviewed with patient.   has a past surgical history that includes other abdominal surgery and appendectomy (1977).    Medications: Medications have been reviewed with patient.  None        Allergies: Allergies have been reviewed with patient.  No Known Allergies    Family History:  family history is not on file.     Social History:   Tobacco:  Denies   Alcohol:  Quit drinking 08/2023, previously drank 4-6 ounces/day  Recreational drugs (illegal and prescription):  Denies   Employment:  Did not assess  Activity Level:  Did not assess   Living situation:  Did not assess  Recent travel:  Did not assess  Primary Care Provider: reviewed Pcp Not In Computer  Other (stressors, spirituality, exposures):  Did not assess    Vitals:  Temp:  [36.2 °C (97.2 °F)-36.6 °C (97.9 °F)] 36.6 °C (97.9 °F)  Pulse:  [] 112  Resp:  [15-22] 20  BP: (122-172)/() 163/104  SpO2:  [89 %-95 %] 91 %    Physical Exam      Labs:   Lab Results   Component Value Date/Time    WBC 9.8 10/03/2023 09:27 PM    RBC 6.54 (H) 10/03/2023 09:27 PM    HEMOGLOBIN 20.4 (H) 10/03/2023 09:27 PM    HEMATOCRIT 63.0 (H) 10/03/2023 09:27 PM    MCV 96.3 10/03/2023 09:27 PM    MCH 31.2 10/03/2023 09:27 PM    MCHC 32.4 10/03/2023 09:27 PM    MPV 9.6 10/03/2023 09:27 PM    NEUTSPOLYS 69.80 10/03/2023 09:27 PM    LYMPHOCYTES 15.20 (L) 10/03/2023 09:27 PM    MONOCYTES 13.10 10/03/2023  "09:27 PM    EOSINOPHILS 1.00 10/03/2023 09:27 PM    BASOPHILS 0.40 10/03/2023 09:27 PM        Lab Results   Component Value Date/Time    SODIUM 138 10/03/2023 09:27 PM    POTASSIUM 5.1 10/03/2023 09:27 PM    CHLORIDE 100 10/03/2023 09:27 PM    CO2 28 10/03/2023 09:27 PM    GLUCOSE 163 (H) 10/03/2023 09:27 PM    BUN 47 (H) 10/03/2023 09:27 PM    CREATININE 0.87 10/03/2023 09:27 PM        No results found for: \"PMVZL52R\", \"ISKZTY197K\", \"EYWML035Z\", \"ARTHCO3\", \"ARTBE\", \"GAPBG\", \"CMD4KNP4\"    No results found for: \"PROTHROMBTM\", \"INR\"     EKG: Per my read, sinus rhythm, age indeterminate anteroseptal infarct, QTc 483     Imaging:   CTA chest pulmonary artery: Negative for PE, presence of mass of right lower lobe representing infiltrate versus neoplasm, trace right pleural effusion.     Previous Data Review: reviewed    Assessment and Plan:   Patient is a 69 yo M with a PMH of COPD, HTN, and T2DM, admitted 10/04/2023 for acute hypoxic respiratory failure.    * Acute hypoxic respiratory failure (HCC)- (present on admission)  Assessment & Plan  Suspect secondary to acute CHF exacerbation and CAP in the setting of obesity and obesity hypoventilation syndrome.  90% on 6L NC, 74% on RA on admission.  Troponin 26, BNP 2965.   CTA chest pulmonary artery: Negative for PE, presence of mass of right lower lobe representing infiltrate versus neoplasm, trace right pleural effusion.   Received furosemide 80 mg IV, CTX 2 g IV, and azithromycin 500 mg in the ER.   -Furosemide 40 mg IV BID (home dose 40 mg PO daily)  -CTX 1 g IV daily x 5 days   -Azithromycin 500 mg daily x 3 days  -F/u respiratory panel  -F/u ECHO  -Telemetry  -Cardiac diet, 2 g sodium, 2000 ml fluid restriction  -Daily weights, I/Os  -RT protocol    Hypertension  Assessment & Plan  -Furosemide 40 mg IV BID (home dose 40 mg PO daily)  -Continue home metoprolol tartrate 100 mg daily    Tachycardia  Assessment & Plan  Likely in the setting of acute hypoxic respiratory " failure.  EKG: Sinus rhythm, age indeterminate anteroseptal infarct, QTc 483   -Continue home metoprolol tartrate 100 mg daily  -See assessment and plan for acute hypoxic respiratory failure        Code Status: FULL CODE  DVT prophylaxis: Heparin  Diet: Cardiac, 2 g sodium, 2000 ml fluid restriction  GI: None  Disposition: Inpatient    Radha Brooks MD  PGY-2 Internal Medicine

## 2023-10-04 NOTE — ASSESSMENT & PLAN NOTE
-Furosemide 40 mg IV BID (home dose 40 mg PO daily)  -Continue home metoprolol tartrate 100 mg daily  -amlodipine added on admission  -add olmesartan 20mg  -Continue monitoring and adjust the medication if needed

## 2023-10-04 NOTE — CARE PLAN
Problem: Ventilation  Goal: Ability to achieve and maintain unassisted ventilation or tolerate decreased levels of ventilator support  Description: Target End Date:  4 days     Document on Vent flowsheet    1.  Support and monitor invasive and noninvasive mechanical ventilation  2.  Monitor ventilator weaning response  3.  Perform ventilator associated pneumonia prevention interventions  4.  Manage ventilation therapy by monitoring diagnostic test results  Outcome: Not Met     BiPAP 16/10/50%

## 2023-10-04 NOTE — DISCHARGE PLANNING
Case Management Discharge Planning    Admission Date: 10/3/2023  GMLOS: 3.9  ALOS: 0    6-Clicks ADL Score: 24  6-Clicks Mobility Score: 20  Therapy orders not indicated at this time    Anticipated Discharge Dispo: Discharge Disposition: Discharged to home/self care (01)    Per chart review pt resides in [place]    Family support:  Anh Celeste Spouse [phone]     Current Insurance on file: Medicare A&B    Last charted orientation status: Oriented to person    DME Needed: pending hospital course  (Pt not on oxygen at home)    Action(s) Taken: chart reviewed    Escalations Completed: None    Medically Clear: No    Next Steps: f/u with pt and medical team to discuss dc needs and barriers.  Assessment to be completed to assess for HCM needs.    Barriers to Discharge: Medical clearance /Specialty Clearance    Is the patient up for discharge tomorrow: No    Care Transition Team Assessment    Information Source  Orientation Level: Oriented to person    Readmission Evaluation  Is this a readmission?: No    Elopement Risk  Legal Hold: No  Ambulatory or Self Mobile in Wheelchair: Yes  Disoriented: No  Psychiatric Symptoms: None  History of Wandering: No  Elopement this Admit: No  Vocalizing Wanting to Leave: No  Displays Behaviors, Body Language Wanting to Leave: No-Not at Risk for Elopement  Elopement Risk: Not at Risk for Elopement    Interdisciplinary Discharge Planning  Lives with - Patient's Self Care Capacity: Spouse  Patient or legal guardian wants to designate a caregiver: No  Support Systems: Family Member(s), Friends / Neighbors  Housing / Facility: [address] [facility]  Prior Services: Home-Independent  Durable Medical Equipment: Not Applicable    Discharge Preparedness  What is your plan after discharge?: Uncertain - pending medical team collaboration  What are your discharge supports?: Spouse  Prior Functional Level: Independent with Medication Management, Independent with Activities of Daily  Living    Functional Assesment  Prior Functional Level: Independent with Medication Management, Independent with Activities of Daily Living    Vision / Hearing Impairment  Vision Impairment : Yes  Right Eye Vision: Impaired, Wears Glasses  Left Eye Vision: Impaired, Wears Glasses  Hearing Impairment : No    Advance Directive  Advance Directive?: None    Domestic Abuse  Have you ever been the victim of abuse or violence?: No  Physical Abuse or Sexual Abuse: No  Verbal Abuse or Emotional Abuse: No  Possible Abuse/Neglect Reported to:: Not Applicable    Psychological Assessment  History of Substance Abuse: None, Alcohol (ETOH on and off per H&P)  History of Psychiatric Problems: No    Discharge Risks or Barriers  Discharge risks or barriers?: Complex medical needs, Other (comment) (lives rurally)  Patient risk factors: Complex medical needs, Vulnerable adult, Other (comment) (lives rurally)    Anticipated Discharge Information  Discharge Disposition: Discharged to home/self care (01)

## 2023-10-04 NOTE — FLOWSHEET NOTE
Pt has AMS/confusion, unable to answer questions. RN notified.      10/04/23 0526   Protocol Assessment   Initial Assessment Yes   Patient History   Pulmonary Diagnosis none   Home O2 No   Nocturnal CPAP No   Home Treatments/Frequency No   Sleep Apnea Screening   Have you had a sleep study?   (pt AMS)   COPD Risk Screening   Do you have a history of COPD?   (pt AMS)   Protocol Pathways   Protocol Pathways None

## 2023-10-05 ENCOUNTER — APPOINTMENT (OUTPATIENT)
Dept: RADIOLOGY | Facility: MEDICAL CENTER | Age: 68
DRG: 291 | End: 2023-10-05
Attending: HOSPITALIST
Payer: MEDICARE

## 2023-10-05 ENCOUNTER — APPOINTMENT (OUTPATIENT)
Dept: RADIOLOGY | Facility: MEDICAL CENTER | Age: 68
DRG: 291 | End: 2023-10-05
Attending: INTERNAL MEDICINE
Payer: MEDICARE

## 2023-10-05 PROBLEM — G47.33 OBSTRUCTIVE SLEEP APNEA: Status: ACTIVE | Noted: 2023-10-05

## 2023-10-05 LAB
ALBUMIN SERPL BCP-MCNC: 3.3 G/DL (ref 3.2–4.9)
ALBUMIN/GLOB SERPL: 1 G/DL
ALP SERPL-CCNC: 54 U/L (ref 30–99)
ALT SERPL-CCNC: 69 U/L (ref 2–50)
ANION GAP SERPL CALC-SCNC: 14 MMOL/L (ref 7–16)
AST SERPL-CCNC: 20 U/L (ref 12–45)
BASE EXCESS BLDV CALC-SCNC: 5 MMOL/L (ref -4–3)
BASOPHILS # BLD AUTO: 0.2 % (ref 0–1.8)
BASOPHILS # BLD: 0.02 K/UL (ref 0–0.12)
BILIRUB SERPL-MCNC: 0.6 MG/DL (ref 0.1–1.5)
BODY TEMPERATURE: ABNORMAL DEGREES
BUN SERPL-MCNC: 34 MG/DL (ref 8–22)
CALCIUM ALBUM COR SERPL-MCNC: 9.7 MG/DL (ref 8.5–10.5)
CALCIUM SERPL-MCNC: 9.1 MG/DL (ref 8.5–10.5)
CHLORIDE SERPL-SCNC: 95 MMOL/L (ref 96–112)
CO2 BLDV-SCNC: 39 MMOL/L (ref 20–33)
CO2 SERPL-SCNC: 30 MMOL/L (ref 20–33)
CREAT SERPL-MCNC: 0.8 MG/DL (ref 0.5–1.4)
DELSYS IDSYS: ABNORMAL
EOSINOPHIL # BLD AUTO: 0.06 K/UL (ref 0–0.51)
EOSINOPHIL NFR BLD: 0.7 % (ref 0–6.9)
ERYTHROCYTE [DISTWIDTH] IN BLOOD BY AUTOMATED COUNT: 59.1 FL (ref 35.9–50)
GFR SERPLBLD CREATININE-BSD FMLA CKD-EPI: 97 ML/MIN/1.73 M 2
GLOBULIN SER CALC-MCNC: 3.4 G/DL (ref 1.9–3.5)
GLUCOSE BLD STRIP.AUTO-MCNC: 160 MG/DL (ref 65–99)
GLUCOSE BLD STRIP.AUTO-MCNC: 164 MG/DL (ref 65–99)
GLUCOSE BLD STRIP.AUTO-MCNC: 201 MG/DL (ref 65–99)
GLUCOSE BLD STRIP.AUTO-MCNC: 243 MG/DL (ref 65–99)
GLUCOSE SERPL-MCNC: 144 MG/DL (ref 65–99)
HAV IGM SERPL QL IA: NORMAL
HBV CORE IGM SER QL: NORMAL
HBV SURFACE AG SER QL: NORMAL
HCO3 BLDV-SCNC: 36.3 MMOL/L (ref 24–28)
HCT VFR BLD AUTO: 56.9 % (ref 42–52)
HCV AB SER QL: NORMAL
HGB BLD-MCNC: 18 G/DL (ref 14–18)
HOROWITZ INDEX BLDV+IHG-RTO: 134 MM[HG]
IMM GRANULOCYTES # BLD AUTO: 0.04 K/UL (ref 0–0.11)
IMM GRANULOCYTES NFR BLD AUTO: 0.5 % (ref 0–0.9)
LYMPHOCYTES # BLD AUTO: 1 K/UL (ref 1–4.8)
LYMPHOCYTES NFR BLD: 12.1 % (ref 22–41)
MAGNESIUM SERPL-MCNC: 1.7 MG/DL (ref 1.5–2.5)
MCH RBC QN AUTO: 31.4 PG (ref 27–33)
MCHC RBC AUTO-ENTMCNC: 31.6 G/DL (ref 32.3–36.5)
MCV RBC AUTO: 99.1 FL (ref 81.4–97.8)
MONOCYTES # BLD AUTO: 1.01 K/UL (ref 0–0.85)
MONOCYTES NFR BLD AUTO: 12.2 % (ref 0–13.4)
NEUTROPHILS # BLD AUTO: 6.16 K/UL (ref 1.82–7.42)
NEUTROPHILS NFR BLD: 74.3 % (ref 44–72)
NRBC # BLD AUTO: 0 K/UL
NRBC BLD-RTO: 0 /100 WBC (ref 0–0.2)
O2/TOTAL GAS SETTING VFR VENT: 50 %
PCO2 BLDV: 74.8 MMHG (ref 41–51)
PCO2 TEMP ADJ BLDV: 76.1 MMHG (ref 41–51)
PH BLDV: 7.29 [PH] (ref 7.31–7.45)
PH TEMP ADJ BLDV: 7.29 [PH] (ref 7.31–7.45)
PHOSPHATE SERPL-MCNC: 4 MG/DL (ref 2.5–4.5)
PLATELET # BLD AUTO: 261 K/UL (ref 164–446)
PMV BLD AUTO: 9.5 FL (ref 9–12.9)
PO2 BLDV: 67 MMHG (ref 25–40)
PO2 TEMP ADJ BLDV: 68 MMHG (ref 25–40)
POTASSIUM SERPL-SCNC: 5.1 MMOL/L (ref 3.6–5.5)
PROCALCITONIN SERPL-MCNC: 0.13 NG/ML
PROT SERPL-MCNC: 6.7 G/DL (ref 6–8.2)
RBC # BLD AUTO: 5.74 M/UL (ref 4.7–6.1)
SAO2 % BLDV: 89 %
SODIUM SERPL-SCNC: 139 MMOL/L (ref 135–145)
SPECIMEN DRAWN FROM PATIENT: ABNORMAL
WBC # BLD AUTO: 8.3 K/UL (ref 4.8–10.8)

## 2023-10-05 PROCEDURE — 83735 ASSAY OF MAGNESIUM: CPT

## 2023-10-05 PROCEDURE — 700102 HCHG RX REV CODE 250 W/ 637 OVERRIDE(OP): Performed by: HOSPITALIST

## 2023-10-05 PROCEDURE — 99222 1ST HOSP IP/OBS MODERATE 55: CPT | Performed by: HOSPITALIST

## 2023-10-05 PROCEDURE — 700105 HCHG RX REV CODE 258: Performed by: HOSPITALIST

## 2023-10-05 PROCEDURE — A9270 NON-COVERED ITEM OR SERVICE: HCPCS | Performed by: INTERNAL MEDICINE

## 2023-10-05 PROCEDURE — 700102 HCHG RX REV CODE 250 W/ 637 OVERRIDE(OP)

## 2023-10-05 PROCEDURE — 97163 PT EVAL HIGH COMPLEX 45 MIN: CPT

## 2023-10-05 PROCEDURE — A9270 NON-COVERED ITEM OR SERVICE: HCPCS | Performed by: HOSPITALIST

## 2023-10-05 PROCEDURE — 99291 CRITICAL CARE FIRST HOUR: CPT | Performed by: INTERNAL MEDICINE

## 2023-10-05 PROCEDURE — 92610 EVALUATE SWALLOWING FUNCTION: CPT

## 2023-10-05 PROCEDURE — 700102 HCHG RX REV CODE 250 W/ 637 OVERRIDE(OP): Performed by: INTERNAL MEDICINE

## 2023-10-05 PROCEDURE — 82962 GLUCOSE BLOOD TEST: CPT

## 2023-10-05 PROCEDURE — 770020 HCHG ROOM/CARE - TELE (206)

## 2023-10-05 PROCEDURE — 700111 HCHG RX REV CODE 636 W/ 250 OVERRIDE (IP): Mod: JZ | Performed by: HOSPITALIST

## 2023-10-05 PROCEDURE — 80053 COMPREHEN METABOLIC PANEL: CPT

## 2023-10-05 PROCEDURE — 700111 HCHG RX REV CODE 636 W/ 250 OVERRIDE (IP): Mod: JZ

## 2023-10-05 PROCEDURE — 97167 OT EVAL HIGH COMPLEX 60 MIN: CPT

## 2023-10-05 PROCEDURE — 71045 X-RAY EXAM CHEST 1 VIEW: CPT

## 2023-10-05 PROCEDURE — 97535 SELF CARE MNGMENT TRAINING: CPT

## 2023-10-05 PROCEDURE — 84100 ASSAY OF PHOSPHORUS: CPT

## 2023-10-05 PROCEDURE — 82803 BLOOD GASES ANY COMBINATION: CPT

## 2023-10-05 PROCEDURE — 85025 COMPLETE CBC W/AUTO DIFF WBC: CPT

## 2023-10-05 PROCEDURE — 94660 CPAP INITIATION&MGMT: CPT

## 2023-10-05 PROCEDURE — A9270 NON-COVERED ITEM OR SERVICE: HCPCS

## 2023-10-05 PROCEDURE — 80074 ACUTE HEPATITIS PANEL: CPT

## 2023-10-05 PROCEDURE — 84145 PROCALCITONIN (PCT): CPT

## 2023-10-05 RX ORDER — ENOXAPARIN SODIUM 100 MG/ML
INJECTION SUBCUTANEOUS
Status: COMPLETED
Start: 2023-10-05 | End: 2023-10-05

## 2023-10-05 RX ORDER — MAGNESIUM SULFATE HEPTAHYDRATE 40 MG/ML
2 INJECTION, SOLUTION INTRAVENOUS ONCE
Status: COMPLETED | OUTPATIENT
Start: 2023-10-05 | End: 2023-10-05

## 2023-10-05 RX ORDER — ENOXAPARIN SODIUM 100 MG/ML
40 INJECTION SUBCUTANEOUS EVERY 12 HOURS
Status: DISCONTINUED | OUTPATIENT
Start: 2023-10-05 | End: 2023-10-11 | Stop reason: HOSPADM

## 2023-10-05 RX ORDER — AMLODIPINE BESYLATE 10 MG/1
10 TABLET ORAL
Status: DISCONTINUED | OUTPATIENT
Start: 2023-10-06 | End: 2023-10-11 | Stop reason: HOSPADM

## 2023-10-05 RX ORDER — OLMESARTAN MEDOXOMIL 20 MG/1
20 TABLET ORAL
Status: DISCONTINUED | OUTPATIENT
Start: 2023-10-05 | End: 2023-10-11 | Stop reason: HOSPADM

## 2023-10-05 RX ORDER — OXYCODONE HYDROCHLORIDE 5 MG/1
5-10 TABLET ORAL EVERY 4 HOURS PRN
Status: DISCONTINUED | OUTPATIENT
Start: 2023-10-05 | End: 2023-10-11 | Stop reason: HOSPADM

## 2023-10-05 RX ORDER — AMLODIPINE BESYLATE 5 MG/1
5 TABLET ORAL ONCE
Status: COMPLETED | OUTPATIENT
Start: 2023-10-05 | End: 2023-10-05

## 2023-10-05 RX ADMIN — DOCUSATE SODIUM 50 MG AND SENNOSIDES 8.6 MG 2 TABLET: 8.6; 5 TABLET, FILM COATED ORAL at 16:36

## 2023-10-05 RX ADMIN — INSULIN HUMAN 2 UNITS: 100 INJECTION, SOLUTION PARENTERAL at 18:29

## 2023-10-05 RX ADMIN — INSULIN HUMAN 3 UNITS: 100 INJECTION, SOLUTION PARENTERAL at 21:09

## 2023-10-05 RX ADMIN — OXYCODONE HYDROCHLORIDE 10 MG: 5 TABLET ORAL at 16:36

## 2023-10-05 RX ADMIN — MAGNESIUM SULFATE HEPTAHYDRATE 2 G: 2 INJECTION, SOLUTION INTRAVENOUS at 11:59

## 2023-10-05 RX ADMIN — AMLODIPINE BESYLATE 5 MG: 5 TABLET ORAL at 05:59

## 2023-10-05 RX ADMIN — INSULIN HUMAN 2 UNITS: 100 INJECTION, SOLUTION PARENTERAL at 06:26

## 2023-10-05 RX ADMIN — AMPICILLIN AND SULBACTAM 3 G: 1; 2 INJECTION, POWDER, FOR SOLUTION INTRAMUSCULAR; INTRAVENOUS at 05:52

## 2023-10-05 RX ADMIN — METOPROLOL SUCCINATE 100 MG: 100 TABLET, EXTENDED RELEASE ORAL at 06:00

## 2023-10-05 RX ADMIN — FUROSEMIDE 40 MG: 10 INJECTION INTRAMUSCULAR; INTRAVENOUS at 05:53

## 2023-10-05 RX ADMIN — OLMESARTAN MEDOXOMIL 20 MG: 20 TABLET, FILM COATED ORAL at 11:22

## 2023-10-05 RX ADMIN — AMPICILLIN AND SULBACTAM 3 G: 1; 2 INJECTION, POWDER, FOR SOLUTION INTRAMUSCULAR; INTRAVENOUS at 00:06

## 2023-10-05 RX ADMIN — ACETAMINOPHEN 650 MG: 325 TABLET, FILM COATED ORAL at 09:13

## 2023-10-05 RX ADMIN — INSULIN HUMAN 3 UNITS: 100 INJECTION, SOLUTION PARENTERAL at 11:34

## 2023-10-05 RX ADMIN — AMLODIPINE BESYLATE 5 MG: 5 TABLET ORAL at 09:13

## 2023-10-05 RX ADMIN — ENOXAPARIN SODIUM 40 MG: 100 INJECTION SUBCUTANEOUS at 16:35

## 2023-10-05 RX ADMIN — AMPICILLIN AND SULBACTAM 3 G: 1; 2 INJECTION, POWDER, FOR SOLUTION INTRAMUSCULAR; INTRAVENOUS at 11:23

## 2023-10-05 RX ADMIN — AMPICILLIN AND SULBACTAM 3 G: 1; 2 INJECTION, POWDER, FOR SOLUTION INTRAMUSCULAR; INTRAVENOUS at 16:35

## 2023-10-05 RX ADMIN — DOCUSATE SODIUM 50 MG AND SENNOSIDES 8.6 MG 2 TABLET: 8.6; 5 TABLET, FILM COATED ORAL at 06:00

## 2023-10-05 RX ADMIN — AZITHROMYCIN 500 MG: 250 TABLET, FILM COATED ORAL at 16:36

## 2023-10-05 RX ADMIN — FUROSEMIDE 40 MG: 10 INJECTION INTRAMUSCULAR; INTRAVENOUS at 16:36

## 2023-10-05 ASSESSMENT — COGNITIVE AND FUNCTIONAL STATUS - GENERAL
SUGGESTED CMS G CODE MODIFIER DAILY ACTIVITY: CK
CLIMB 3 TO 5 STEPS WITH RAILING: TOTAL
WALKING IN HOSPITAL ROOM: A LOT
DAILY ACTIVITIY SCORE: 16
TOILETING: A LOT
MOBILITY SCORE: 9
PERSONAL GROOMING: A LITTLE
DRESSING REGULAR UPPER BODY CLOTHING: A LITTLE
DRESSING REGULAR LOWER BODY CLOTHING: A LOT
TURNING FROM BACK TO SIDE WHILE IN FLAT BAD: A LOT
MOVING FROM LYING ON BACK TO SITTING ON SIDE OF FLAT BED: UNABLE
SUGGESTED CMS G CODE MODIFIER MOBILITY: CM
MOVING TO AND FROM BED TO CHAIR: UNABLE
HELP NEEDED FOR BATHING: A LOT
STANDING UP FROM CHAIR USING ARMS: A LOT

## 2023-10-05 ASSESSMENT — ENCOUNTER SYMPTOMS
DIZZINESS: 0
SHORTNESS OF BREATH: 1
DOUBLE VISION: 0
SORE THROAT: 0
FEVER: 0
HEADACHES: 0
NAUSEA: 0
VOMITING: 0
PALPITATIONS: 0
ABDOMINAL PAIN: 0
COUGH: 1
BLURRED VISION: 0
LOSS OF CONSCIOUSNESS: 0
BACK PAIN: 0
CHILLS: 0
DIARRHEA: 0

## 2023-10-05 ASSESSMENT — PAIN DESCRIPTION - PAIN TYPE
TYPE: ACUTE PAIN
TYPE: ACUTE PAIN
TYPE: CHRONIC PAIN
TYPE: ACUTE PAIN
TYPE: CHRONIC PAIN

## 2023-10-05 ASSESSMENT — PULMONARY FUNCTION TESTS
EPAP_CMH2O: 8
EPAP_CMH2O: 10

## 2023-10-05 ASSESSMENT — FIBROSIS 4 INDEX: FIB4 SCORE: 0.61

## 2023-10-05 ASSESSMENT — GAIT ASSESSMENTS: GAIT LEVEL OF ASSIST: UNABLE TO PARTICIPATE

## 2023-10-05 ASSESSMENT — ACTIVITIES OF DAILY LIVING (ADL): TOILETING: INDEPENDENT

## 2023-10-05 NOTE — PROGRESS NOTES
Pulmonary and Critical Care Medicine Progress Note    Blair is improved over the course of the day.  He is off BiPAP.  I have asked pulmonary to follow him after he is transferred out of the ICU.  He may be safely transferred out of ICU.    Dallin Foster MD  Pulmonary and Critical Care Medicine

## 2023-10-05 NOTE — ASSESSMENT & PLAN NOTE
Provisional diagnosis  He has never had a sleep study  His body habitus places him at high risk for sleep disordered breathing and his wife has reported snoring and gasping at night  Empiric CPAP with sleep  He requires a polysomnogram with titration after dismissal

## 2023-10-05 NOTE — CARE PLAN
Problem: Ventilation  Goal: Ability to achieve and maintain unassisted ventilation or tolerate decreased levels of ventilator support  Description: Target End Date:  4 days     Document on Vent flowsheet    1.  Support and monitor invasive and noninvasive mechanical ventilation  2.  Monitor ventilator weaning response  3.  Perform ventilator associated pneumonia prevention interventions  4.  Manage ventilation therapy by monitoring diagnostic test results  Outcome: Progressing       Respiratory Update    Treatment modality: BIPAP 16/10@50%  Frequency: Q4    Pt tolerating current treatments well with no adverse reactions.

## 2023-10-05 NOTE — THERAPY
Occupational Therapy   Initial Evaluation     Patient Name: Blair Celeste  Age:  68 y.o., Sex:  male  Medical Record #: [id number]  Today's Date: 10/5/2023    Precautions: Fall Risk    Assessment    Patient is 68 y.o. male admitted with SOB and PNA, pmhx includes ETOH, DM2, and HTN. Pt presents to OT eval below his baseline of functional independence. Pt reports he was previously independent for ADLs and ambulation despite chronic knee pain, however today requires assist and direct cues to complete sit>stand and transfer, as well as complete toileting hygiene. Anticipate rapid improvement in functional independence with repetition, recommend pt sit EOB for meals and transfer to [facility] for toileting to increase OOB time while admitted. Acute OT to follow, will update DC recommendations pending increased independence in ADLs.     Plan    Occupational Therapy Initial Treatment Plan   Treatment Interventions: Self Care / Activities of Daily Living, Therapeutic Exercises, Therapeutic Activity, Adaptive Equipment  Treatment Frequency: 3 Times per Week  Duration: Until Therapy Goals Met    DC Equipment Recommendations: Unable to determine at this time  Discharge Recommendations: Recommend post-acute placement for additional occupational therapy services prior to discharge home (nancy improve to home with home health while admitted, pending progress in ADLs)     Objective     10/05/23 0838   Prior Living Situation   Prior Services None   Housing / Facility Mobile Home   Steps Into Home 3   Bathroom Set up Walk In Shower   Lives with - Patient's Self Care Capacity Spouse   Comments reports wife assists intermittently   Prior Level of ADL Function   Self Feeding Independent   Grooming / Hygiene Independent   Bathing Independent   Dressing Independent   Toileting Independent   Comments wife assists with dressing occasionally   Prior Level of IADL Function   Medication Management Independent   Laundry Independent   Kitchen  Mobility Independent   Finances Independent   Home Management Independent   Shopping Independent   Prior Level Of Mobility Independent Without Device in Community   Precautions   Precautions Fall Risk   Pain 0 - 10 Group   Therapist Pain Assessment Post Activity Pain Same as Prior to Activity;Nurse Notified  (chronic knee pain)   Cognition    Level of Consciousness Alert   Active ROM Upper Body   Active ROM Upper Body  WDL   Strength Upper Body   Upper Body Strength  X   Upper Body Muscle Tone   Upper Body Muscle Tone  WDL   Coordination Upper Body   Coordination WDL   Balance Assessment   Sitting Balance (Static) Fair +   Sitting Balance (Dynamic) Fair   Standing Balance (Static) Fair -   Standing Balance (Dynamic) Poor +   Weight Shift Sitting Fair   Weight Shift Standing Poor   Comments FWW   Bed Mobility    Supine to Sit Moderate Assist   Sit to Supine Minimal Assist   Scooting Minimal Assist   ADL Assessment   Eating Supervision   Grooming Supervision;Seated   Upper Body Dressing Minimal Assist   Lower Body Dressing Maximal Assist   Toileting Maximal Assist   How much help from another person does the patient currently need...   Putting on and taking off regular lower body clothing? 2   Bathing (including washing, rinsing, and drying)? 2   Toileting, which includes using a toilet, bedpan, or urinal? 2   Putting on and taking off regular upper body clothing? 3   Taking care of personal grooming such as brushing teeth? 3   Eating meals? 4   6 Clicks Daily Activity Score 16   Functional Mobility   Sit to Stand Moderate Assist   Bed, Chair, Wheelchair Transfer Moderate Assist   Toilet Transfers Minimal Assist   Mobility EOB <> BSC   Activity Tolerance   Sitting in Chair 10min   Sitting Edge of Bed 8min   Standing transfers only   Patient / Family Goals   Patient / Family Goal #1 home ASAP   Short Term Goals   Short Term Goal # 1 pt will complete functional transfers at SPV level   Short Term Goal # 2 pt will  complete toileting ADL and hygiene at SPV level   Short Term Goal # 3 pt will demo LB dress with AE PRN at SPV level   Education Group   Education Provided Role of Occupational Therapist;Activities of Daily Living;Transfers   Role of Occupational Therapist Patient Response Patient;Acceptance;Explanation;Verbal Demonstration   Transfers Patient Response Patient;Acceptance;Explanation;Verbal Demonstration   ADL Patient Response Patient;Acceptance;Explanation;Verbal Demonstration   Occupational Therapy Initial Treatment Plan    Treatment Interventions Self Care / Activities of Daily Living;Therapeutic Exercises;Therapeutic Activity;Adaptive Equipment   Treatment Frequency 3 Times per Week   Duration Until Therapy Goals Met   Problem List   Problem List Decreased Homemaking Skills;Decreased Active Daily Living Skills;Decreased Functional Mobility;Decreased Activity Tolerance;Impaired Postural Control / Balance;Decreased Upper Extremity Strength Right;Decreased Upper Extremity Strength Left;Safety Awareness Deficits / Cognition   Anticipated Discharge Equipment and Recommendations   DC Equipment Recommendations Unable to determine at this time   Discharge Recommendations Recommend post-acute placement for additional occupational therapy services prior to discharge home  (ozzyley improve to home with home health while admitted, pending progress in ADLs)

## 2023-10-05 NOTE — CARE PLAN
The patient is Watcher - Medium risk of patient condition declining or worsening    Shift Goals  Clinical Goals: Sputum culture, monitor respiratory status  Patient Goals: Rest, update  Family Goals: GALE    Progress made toward(s) clinical / shift goals:    Problem: Fall Risk  Goal: Patient will remain free from falls  Outcome: Progressing     Problem: Knowledge Deficit - Standard  Goal: Patient and family/care givers will demonstrate understanding of plan of care, disease process/condition, diagnostic tests and medications  Outcome: Progressing     Problem: Hemodynamics  Goal: Patient's hemodynamics, fluid balance and neurologic status will be stable or improve  Outcome: Progressing     Problem: Respiratory  Goal: Patient will achieve/maintain optimum respiratory ventilation and gas exchange  Outcome: Progressing     Problem: Pain - Standard  Goal: Alleviation of pain or a reduction in pain to the patient’s comfort goal  Outcome: Progressing

## 2023-10-05 NOTE — PROGRESS NOTES
4 Eyes Skin Assessment Completed by TAHIR Guerrero and TAHIR Martell.    Head WDL  Ears Redness and Blanching  Nose WDL  Mouth WDL  Neck WDL  Breast/Chest WDL  Shoulder Blades Redness and Blanching  Spine Redness and Blanching  (R) Arm/Elbow/Hand Redness and Blanching  (L) Arm/Elbow/Hand Redness and Blanching  Abdomen WDL  Groin Redness and Blanching  Scrotum/Coccyx/Buttocks Redness and Blanching  (R) Leg Redness, Blanching, Scab, and Swelling  (L) Leg Redness, Blanching, and Edema  (R) Heel/Foot/Toe Redness, Blanching, and Edema  (L) Heel/Foot/Toe Redness, Blanching, and Edema          Devices In Places Tele Box, Pulse Ox, and Nasal Cannula      Interventions In Place Gray Ear Foams, NC W/Ear Foams, Waffle Overlay, Pillows, Dri-Julio Cesar Pads, and Heels Loaded W/Pillows    Possible Skin Injury No    Pictures Uploaded Into Epic N/A  Wound Consult Placed N/A  RN Wound Prevention Protocol Ordered No

## 2023-10-05 NOTE — ASSESSMENT & PLAN NOTE
CT imaging with evidence of cirrhosis  Likely alcoholic cirrhosis  Trend liver enzymes and synthetic function  Avoid hepatotoxins

## 2023-10-05 NOTE — PROGRESS NOTES
Critical Care Progress Note    Date of admission  10/3/2023    Chief Complaint  68 y.o. male admitted 10/3/2023 with respiratory failure, pneumonia.  He has a history of alcohol abuse, DM type II and primary hypertension.    Hospital Course      10/5 -    titrating BiPAP.  Force diuresis.      Interval Problem Update  Reviewed last 24 hour events:      SR  BiPAP this am  Try NC  99.3  -3010 mL in the last 24      Review of Systems  Review of Systems   Unable to perform ROS: Acuity of condition        Vital Signs for last 24 hours   Temp:  [36.8 °C (98.2 °F)-37.4 °C (99.3 °F)] 37.4 °C (99.3 °F)  Pulse:  [] 94  Resp:  [5-58] 14  BP: (110-172)/() 157/83  SpO2:  [91 %-97 %] 94 %    Hemodynamic parameters for last 24 hours       Respiratory Information for the last 24 hours       Physical Exam   Physical Exam  Constitutional:       Appearance: He is obese.   HENT:      Head: Normocephalic.   Eyes:      Pupils: Pupils are equal, round, and reactive to light.   Cardiovascular:      Comments: Sinus rhythm  Pulmonary:      Breath sounds: Rales (Scattered crackles) present. No wheezing.   Abdominal:      General: There is no distension.      Tenderness: There is no abdominal tenderness.   Musculoskeletal:      Right lower leg: Edema present.      Left lower leg: Edema present.   Skin:     General: Skin is warm.   Neurological:      General: No focal deficit present.      Mental Status: He is oriented to person, place, and time.      Cranial Nerves: No cranial nerve deficit.         Medications  Current Facility-Administered Medications   Medication Dose Route Frequency Provider Last Rate Last Admin    amLODIPine (Norvasc) tablet 5 mg  5 mg Oral Once Dallin Foster M.D.        [START ON 10/6/2023] amLODIPine (Norvasc) tablet 10 mg  10 mg Oral Q DAY Dallin Foster M.D.        senna-docusate (Pericolace Or Senokot S) 8.6-50 MG per tablet 2 Tablet  2 Tablet Oral BID Radha Brooks M.D.   2 Tablet at  10/05/23 0600    And    polyethylene glycol/lytes (Miralax) PACKET 1 Packet  1 Packet Oral QDAY PRN Radha Brooks M.D.        And    magnesium hydroxide (Milk Of Magnesia) suspension 30 mL  30 mL Oral QDAY PRN Radha Brooks M.D.        And    bisacodyl (Dulcolax) suppository 10 mg  10 mg Rectal QDAY PRN Radha Brooks M.D.        acetaminophen (Tylenol) tablet 650 mg  650 mg Oral Q6HRS PRN Radha Brooks M.D.        furosemide (Lasix) injection 40 mg  40 mg Intravenous BID DIURETIC Radha Brooks M.D.   40 mg at 10/05/23 0553    azithromycin (Zithromax) tablet 500 mg  500 mg Oral DAILY Radha Brooks M.D.        Respiratory Therapy Consult   Nebulization Continuous RT Radha Brooks M.D.        ampicillin/sulbactam (Unasyn) 3 g in  mL IVPB  3 g Intravenous Q6HRS Lloyd Quevedo M.D.   Stopped at 10/05/23 0622    metoprolol SR (Toprol XL) tablet 100 mg  100 mg Oral Q DAY Maida Diallo M.D.   100 mg at 10/05/23 0600    thiamine (B-1) 500 mg in dextrose 5% 100 mL IVPB  500 mg Intravenous DAILY Dallin Foster M.D. 200 mL/hr at 10/04/23 2019 500 mg at 10/04/23 2019    Followed by    [START ON 10/8/2023] thiamine (Vitamin B-1) tablet 100 mg  100 mg Oral DAILY Dallin Foster M.D. MD Alert...ICU Electrolyte Replacement per Pharmacy   Other PHARMACY TO DOSE Dallin Foster M.D.        insulin regular (HumuLIN R,NovoLIN R) injection  2-9 Units Subcutaneous 4X/DAY ACHS Dallin Foster M.D.   2 Units at 10/05/23 0626    And    dextrose 50% (D50W) injection 25 g  25 g Intravenous Q15 MIN PRN Dallin Foster M.D.           Fluids    Intake/Output Summary (Last 24 hours) at 10/5/2023 0759  Last data filed at 10/5/2023 0600  Gross per 24 hour   Intake 559.91 ml   Output 3570 ml   Net -3010.09 ml       Laboratory  Recent Labs     10/04/23  1323 10/04/23  1651 10/05/23  0227   FJGSS34J 7.23*  --   --    [id number] 80.0*  --   --    [id number] 79.5  --   --    ACRE4FYE  93.8  --   --    ARTHCO3 33*  --   --    Y0VKOWMSS 6L  6.0  --   --    ARTBE 1  --   --    ISTATAPH  --  7.256*  --    ISTATAPCO2  --  77.8*  --    ISTATAPO2  --  82  --    ISTATATCO2  --  37*  --    [id number]  --  93  --    ISTATARTHCO3  --  34.6*  --    ISTATARTBE  --  3  --    ISTATTEMP  --  97.3 F 99.3 F   ISTATFIO2  --  50 50   ISTATSPEC  --  Arterial Venous   ISTATAPHTC  --  7.266*  --    [id number]  --  78  --          Recent Labs     10/04/23  0423 10/04/23  1322 10/05/23  0615   SODIUM 140 137 139   POTASSIUM 5.2 5.1 5.1   CHLORIDE 98 94* 95*   CO2 32 34* 30   BUN 43* 42* 34*   CREATININE 0.92 1.01 0.80   MAGNESIUM 1.8  --  1.7   PHOSPHORUS 6.2*  --  4.0   CALCIUM 9.5 9.5 9.1     Recent Labs     10/04/23  0423 10/04/23  1322 10/05/23  0615   ALTSGPT 109* 101* 69*   ASTSGOT 27 22 20   ALKPHOSPHAT 64 69 54   TBILIRUBIN 0.4 0.5 0.6   GLUCOSE 152* 233* 144*     Recent Labs     10/03/23  2127 10/04/23  0423 10/04/23  1322 10/05/23  0615   WBC 9.8 11.8*  --  8.3   NEUTSPOLYS 69.80 73.70*  --  74.30*   LYMPHOCYTES 15.20* 12.60*  --  12.10*   MONOCYTES 13.10 11.80  --  12.20   EOSINOPHILS 1.00 0.90  --  0.70   BASOPHILS 0.40 0.30  --  0.20   ASTSGOT 34 27 22 20   ALTSGPT 125* 109* 101* 69*   ALKPHOSPHAT 71 64 69 54   TBILIRUBIN 0.4 0.4 0.5 0.6     Recent Labs     10/03/23  2127 10/04/23  0423 10/04/23  1514 10/05/23  0615   RBC 6.54* 6.40*  --  5.74   HEMOGLOBIN 20.4* 19.3*  --  18.0   HEMATOCRIT 63.0* 62.4*  --  56.9*   PLATELETCT 279 274  --  261   PROTHROMBTM  --   --  13.1  --    INR  --   --  0.98  --        Imaging  X-Ray:  I have personally reviewed the images and compared with prior images. and My impression is: Right hilar fullness.  Fluid in the minor fissure.  Opacification in the right midlung.    Assessment/Plan  * Acute on chronic respiratory failure with hypoxia and hypercapnia (HCC)- (present on admission)  Assessment & Plan  Suspect chronic hypoxemia with polycythemia on presentation  I am  titrating BiPAP for respiratory distress  High risk of deterioration    Type 2 diabetes mellitus (HCC)  Assessment & Plan  Glycohemoglobin 8.5  Sliding scale insulin    Cirrhosis (HCC)  Assessment & Plan  CT imaging with evidence of cirrhosis  Likely alcoholic cirrhosis  Trend liver enzymes and synthetic function  Avoid hepatotoxins    Alcohol abuse  Assessment & Plan  High-dose IV thiamine and supplemental vitamins  Observe for evidence of withdrawal  Cessation counseling when clinically appropriate    Acute encephalopathy  Assessment & Plan  Acute metabolic encephalopathy due to hypercarbia  Limit sedatives and mind altering medications is much as possible  Follow neuro exam  Resolved this morning    Acute on chronic right-sided heart failure (HCC)  Assessment & Plan  Echocardiogram with enlarged RV with decreased RV systolic function  Force diuresis with furosemide    Pneumonia  Assessment & Plan  Continue Unasyn and azithromycin    Abnormal CT of the chest  Assessment & Plan  CT with right lower lobe masslike opacification  Differential diagnosis includes pneumonia, malignancy, rounded atelectasis  I am highly suspicious of malignancy with his history of hemoptysis and tobacco abuse  He requires bronchoscopy with biopsy when clinically improved    Primary hypertension  Assessment & Plan  Goal SBP less than 160  Increase amlodipine, 10 mg daily  Continue metoprolol succinate, 100 mg daily    Obstructive sleep apnea  Assessment & Plan  Provisional diagnosis  He has never had a sleep study  His body habitus places him at high risk for sleep disordered breathing and his wife has reported snoring and gasping at night  Empiric CPAP with sleep  He requires a polysomnogram with titration after dismissal         VTE:  Contraindicated  Ulcer: Not Indicated  Lines: Sánchez Catheter  Ongoing indication addressed    I have performed a physical exam and reviewed and updated ROS and Plan today (10/5/2023). In review of  yesterday's note (10/4/2023), there are no changes except as documented above.     I have assessed and reassessed his respiratory status with the titration of BiPAP for respiratory distress, his blood pressure, hemodynamics, cardiovascular status and neurologic status.  He is at increased risk for worsening respiratory and cardiovascular system dysfunction.    Discussed patient condition and risk of morbidity and/or mortality with RN, RT, Pharmacy, Charge nurse / hot rounds, and QA team    The patient remains critically ill.  Critical care time = 35 minutes in directly providing and coordinating critical care and extensive data review.  No time overlap and excludes procedures.    Dallin Foster MD  Pulmonary and Critical Care Medicine

## 2023-10-05 NOTE — CONSULTS
Hospital Medicine Consultation    Date of Service  10/5/2023    Referring Physician  JAMILAH Estes*    Consulting Physician  Raul Donahue D.O.    Reason for Consultation  Acute respiratory failure    History of Presenting Illness  68 y.o. male who presented 10/3/2023 with a PMHx of ETOH abuse, tobacco abuse, DM, HTN, BMI 43, COPD.  Presented 10/4 with SOB.  CTPA neg for PE but did show large LLL infiltrate.  Incidentally noted to have a pulmonary mass.  Admitted to ICU on BiPAP, Abx's.    Review of Systems  Review of Systems   Constitutional:  Negative for chills and fever.   HENT:  Negative for nosebleeds and sore throat.    Eyes:  Negative for blurred vision and double vision.   Respiratory:  Positive for cough and shortness of breath.    Cardiovascular:  Positive for leg swelling. Negative for chest pain and palpitations.   Gastrointestinal:  Negative for abdominal pain, diarrhea, nausea and vomiting.   Genitourinary:  Negative for dysuria and urgency.   Musculoskeletal:  Negative for back pain.        R knee pain   Skin:  Negative for rash.   Neurological:  Negative for dizziness, loss of consciousness and headaches.       Past Medical History   has a past medical history of COPD (chronic obstructive pulmonary disease) (HCC), Diabetes (HCC), and Hypertension.    Surgical History   has a past surgical history that includes other abdominal surgery and appendectomy (1977).    Family History  family history is not on file.    Social History   reports that he quit smoking about 23 years ago. His smoking use included cigars. He started smoking about 51 years ago. He has never used smokeless tobacco. He reports that he does not currently use alcohol. He reports that he does not use drugs.    Medications  Prior to Admission Medications   Prescriptions Last Dose Informant Patient Reported? Taking?   allopurinol (ZYLOPRIM) 100 MG Tab UNK at [facility] Patient's Home Pharmacy Yes Yes   Sig: Take 100 mg by  mouth every day.   dapagliflozin propanediol (FARXIGA) 10 MG Tab UNK at [facility] Patient's Home Pharmacy Yes Yes   Sig: Take 10 mg by mouth every day.   furosemide (LASIX) 40 MG Tab 10/3/2023 at AM Patient Yes Yes   Sig: Take 40 mg by mouth every day.   metoprolol tartrate (LOPRESSOR) 100 MG Tab 10/3/2023 at AM Patient Yes Yes   Sig: Take 100 mg by mouth every day.      Facility-Administered Medications: None       Allergies  No Known Allergies    Physical Exam  Temp:  [36.8 °C (98.2 °F)-37.4 °C (99.3 °F)] 37.4 °C (99.3 °F)  Pulse:  [] 92  Resp:  [5-58] 41  BP: (110-172)/() 146/75  SpO2:  [91 %-97 %] 93 %    Physical Exam  Constitutional:       General: He is not in acute distress.     Appearance: He is well-developed. He is obese. He is not diaphoretic.   HENT:      Head: Normocephalic and atraumatic.   Eyes:      Conjunctiva/sclera: Conjunctivae normal.   Neck:      Comments: Unable to assess JVD due to body habitus  Cardiovascular:      Rate and Rhythm: Normal rate.      Heart sounds: No murmur heard.     No gallop.   Pulmonary:      Effort: Pulmonary effort is normal. No respiratory distress.      Breath sounds: No stridor. Rales present. No wheezing.   Abdominal:      Palpations: Abdomen is soft.      Tenderness: There is no abdominal tenderness. There is no guarding or rebound.   Musculoskeletal:      Right lower leg: Edema present.      Left lower leg: Edema present.   Skin:     General: Skin is warm and dry.      Findings: No rash.   Neurological:      Mental Status: He is alert and oriented to person, place, and time.   Psychiatric:         Mood and Affect: Mood normal.         Behavior: Behavior normal.         Thought Content: Thought content normal.         Fluids  Date 10/05/23 0700 - 10/06/23 0659   Shift 8573-8353 8158-5497 2132-6975 24 Hour Total   INTAKE   P.O. 240   240   Shift Total 240   240   OUTPUT   Urine 400   400   Shift Total 400   400   Weight (kg) 149.5 149.5 149.5 149.5        Laboratory  Recent Labs     10/03/23  2127 10/04/23  0423 10/05/23  0615   WBC 9.8 11.8* 8.3   RBC 6.54* 6.40* 5.74   HEMOGLOBIN 20.4* 19.3* 18.0   HEMATOCRIT 63.0* 62.4* 56.9*   MCV 96.3 97.5 99.1*   MCH 31.2 30.2 31.4   MCHC 32.4 30.9* 31.6*   RDW 58.0* 58.7* 59.1*   PLATELETCT 279 274 261   MPV 9.6 9.5 9.5     Recent Labs     10/04/23  0423 10/04/23  1322 10/05/23  0615   SODIUM 140 137 139   POTASSIUM 5.2 5.1 5.1   CHLORIDE 98 94* 95*   CO2 32 34* 30   GLUCOSE 152* 233* 144*   BUN 43* 42* 34*   CREATININE 0.92 1.01 0.80   CALCIUM 9.5 9.5 9.1     Recent Labs     10/04/23  1514   INR 0.98          Recent Labs     10/04/23  0423   TRIGLYCERIDE 124   HDL 26*   LDL 63        Imaging  DX-CHEST-PORTABLE (1 VIEW)   Final Result         1.  Linear densities in the right midlung and left lung base suggesting atelectasis or infiltrate.   2.  Cardiomegaly      EC-ECHOCARDIOGRAM COMPLETE W/O CONT   Final Result      CT-CTA NECK WITH & W/O-POST PROCESSING   Final Result      Atherosclerosis of the carotid bifurcations and bulbs without hemodynamically significant stenosis.      Patent vertebral arteries.      CT-CTA HEAD WITH & W/O-POST PROCESS   Final Result      No large vessel occlusion or hemodynamically significant stenosis or aneurysm.      CT-CEREBRAL PERFUSION ANALYSIS   Final Result      1.  Small focus of potentially reversible ischemia in the left parietal lobe.      CT-HEAD W/O   Final Result      No acute intracranial abnormality.                  CT-CTA CHEST PULMONARY ARTERY W/ RECONS   Final Result         1.  No large central pulmonary embolus is appreciated, evaluation of the subsegmental branches is essentially nondiagnostic due to motion artifacts. Additional imaging would be required for definitive exclusion of small distal pulmonary emboli.   2.  Masslike consolidation in the right lower lobe, could represent round atelectasis or infiltrate however appearance is concerning for pulmonary mass.  Should be considered neoplastic less frequent otherwise.   3.  Hazy right lower lobe opacities suggests component of superimposed infiltrate.   4.  Trace right pleural effusion.   5.  Mediastinal and right hilar lymph nodes, appearance concerning for possible metastatic disease given pulmonary finding.   6.  Fluid in the left upper quadrant adjacent to the spleen, density most typical of ascites   7.  Irregular hepatic contour compatible with changes of cirrhosis.   8.  Atherosclerosis and atherosclerotic coronary artery disease.   9.  Pulmonary nodule, see nodule follow-up recommendations below.      Fleischner Society pulmonary nodule recommendations:   Low and High Risk: Consider CT at 3 months, PET/CT, or tissue sampling.      Low Risk - Minimal or absent history of smoking and of other known risk factors.      High Risk - History of smoking or of other known risk factors.      Note: These recommendations do not apply to lung cancer screening, patients with immunosuppression, or patients with known primary cancer.      Fleischner Society 2017 Guidelines for Management of Incidentally Detected Pulmonary Nodules in Adults         US-PARACENTESIS, ABD WITH IMAGING    (Results Pending)   MR-BRAIN-W/O    (Results Pending)       Assessment/Plan  * Acute on chronic respiratory failure with hypoxia and hypercapnia (HCC)- (present on admission)  Assessment & Plan  In setting of untreated RAYMOND  R heart failure +/- pneumonia, possible COPD  Responding to diuresis  Cont O2/RT protocols    Obstructive sleep apnea  Assessment & Plan  CPAP while in house  See if we can arrange for home    Type 2 diabetes mellitus (HCC)  Assessment & Plan  A1c 8.5   On dapagliflozin as outpt  Covering with SSI while in house  Add glargine if needed    Cirrhosis (HCC)  Assessment & Plan  Likely ETOH related  Component of fatty liver certainly possible  Check hep panel  Will need outpt follow up  Reports he's quit drinking; counseled him today on  need for sobriety    Alcohol abuse  Assessment & Plan  Reports his last drink was 2 wks ago  Watch for signs of withdrawal    Acute encephalopathy  Assessment & Plan  Acute metabolic encephalopathy since resolved    Acute on chronic right-sided heart failure (HCC)  Assessment & Plan  TTE showing LVEF 75%, calcified aortic valve uable to asses degree of stenosis, decreased R heart systolic function  IV lasix 40mg IV BID with good UOP.  Titrate to goal >1.5 litres neg daily  Daily BMP  Follow UOP  Replace K as needed  On BB  Start ARB        Pneumonia  Assessment & Plan  Continue iv unasyn/azithro  Aspiration pna? I have asked speech to see patient.       Abnormal CT of the chest  Assessment & Plan  cta chest reviewed  DW pulm to assess if repeat CT vs Bx is indicated  If Bx endobronchial vs transthroacic        Primary hypertension  Assessment & Plan  -Furosemide 40 mg IV BID (home dose 40 mg PO daily)  -Continue home metoprolol tartrate 100 mg daily  -amlodipine added on admission  -add olmesartan 20mg

## 2023-10-05 NOTE — THERAPY
Physical Therapy   Initial Evaluation     Patient Name: Blair Celeste  Age:  68 y.o., Sex:  male  Medical Record #: [id number]  Today's Date: 10/5/2023     Precautions  Precautions: Fall Risk    Assessment  Patient is a 68 y.o. male with hx of alcohol abuse, DM, and HTN admitted with acute hypoxemic respiratory failure, PNA, AMS, and acute on chronic R sided heart failure. PT eval complete, pt currently presents below his functional baseline due to pain and impaired strength, balance, activity tolerance, and mobility. Pt reports being fully independent at baseline, however he is now generally at Mod A with limited standing mobility due to R knee pain and generalized weakness. Pt needs cues/education throughout to participate fully as pt tends to think he needs more assist with mobility than he actually does. Recommend post acute placement at this time to maximize functional independence. Will follow while admitted for skilled PT intervention.     Plan    Physical Therapy Initial Treatment Plan   Treatment Plan : Bed Mobility, Gait Training, Neuro Re-Education / Balance, Self Care / Home Evaluation, Stair Training, Therapeutic Activities, Therapeutic Exercise  Treatment Frequency: 4 Times per Week  Duration: Until Therapy Goals Met    DC Equipment Recommendations: Unable to determine at this time  Discharge Recommendations: Recommend post-acute placement for additional physical therapy services prior to discharge home         Pain 0 - 10 Group   Location Knee   Location Orientation Right   Pain Rating Scale (NPRS)   (not quantified)   Description Aching   Therapist Pain Assessment During Activity   Prior Living Situation   Prior Services None   Housing / Facility Mobile Home  (5th wheel)   Steps Into Home 3   Steps In Home 0   Equipment Owned None   Lives with - Patient's Self Care Capacity Spouse   Comments reports living with his wife. notes he is having a cabin built that he can move into soon with 0 KASH    Prior Level of Functional Mobility   Bed Mobility Independent   Transfer Status Independent   Ambulation Independent   Ambulation Distance community distances   Assistive Devices Used None   Stairs Independent   Cognition    Level of Consciousness Alert   Comments pt needs consistent cues/encouragement to complete mobility and put it in full effort. distracted at times   Active ROM Lower Body    Active ROM Lower Body  X   Comments decreased R knee extension due to pain   Strength Lower Body   Lower Body Strength  X   Comments RLE limited by knee pain. LLE WNL   Coordination Lower Body    Comments RLE coordination grossly impaired by pain and weakness   Balance Assessment   Sitting Balance (Static) Fair +   Sitting Balance (Dynamic) Fair   Standing Balance (Static) Fair -   Standing Balance (Dynamic) Poor +   Weight Shift Sitting Fair   Weight Shift Standing Poor   Comments FWW   Bed Mobility    Supine to Sit Moderate Assist   Sit to Supine Minimal Assist   Scooting Minimal Assist   Gait Analysis   Gait Level Of Assist Unable to Participate   Level of Assist with Stairs Unable to Participate   Functional Mobility   Sit to Stand Moderate Assist   Bed, Chair, Wheelchair Transfer Moderate Assist   Toilet Transfers Moderate Assist   Mobility eob<>bsc   Comments increased cues for hand positioning and sequencing. pt needing consistent encouragement to mobilize himself as he thinks he needs more help than he does   ICU Target Mobility Level   ICU Mobility - Targeted Level Level 3B   How much difficulty does the patient currently have...   Turning over in bed (including adjusting bedclothes, sheets and blankets)? 2   Sitting down on and standing up from a chair with arms (e.g., wheelchair, bedside commode, etc.) 1   Moving from lying on back to sitting on the side of the bed? 1   How much help from another person does the patient currently need...   Moving to and from a bed to a chair (including a wheelchair)? 2   Need to  walk in a hospital room? 2   Climbing 3-5 steps with a railing? 1   6 clicks Mobility Score 9   Activity Tolerance   Sitting in Chair 10 min (BSC)   Sitting Edge of Bed 8 min   Standing 2 min   Comments limited by weakness/pain   Short Term Goals    Short Term Goal # 1 pt will move supine<>eob with spv in 6 tx for bed mobility.   Short Term Goal # 2 pt will complete spt with fww and spv in 6 tx for functional mobility.   Short Term Goal # 3 pt will ambulate 150 ft with fww and spv in 6 tx for household distances.   Short Term Goal # 4 pt will negotiate 3 stairs with sba in 6 tx for home access.   Education Group   Education Provided Role of Physical Therapist   Role of Physical Therapist Patient Response Patient;Acceptance;Explanation;Verbal Demonstration   Physical Therapy Initial Treatment Plan    Treatment Plan  Bed Mobility;Gait Training;Neuro Re-Education / Balance;Self Care / Home Evaluation;Stair Training;Therapeutic Activities;Therapeutic Exercise   Treatment Frequency 4 Times per Week   Duration Until Therapy Goals Met   Problem List    Problems Pain;Impaired Bed Mobility;Impaired Transfers;Impaired Ambulation;Functional ROM Deficit;Functional Strength Deficit;Impaired Balance;Decreased Activity Tolerance   Anticipated Discharge Equipment and Recommendations   DC Equipment Recommendations Unable to determine at this time   Discharge Recommendations Recommend post-acute placement for additional physical therapy services prior to discharge home   Interdisciplinary Plan of Care Collaboration   IDT Collaboration with  Nursing;Occupational Therapist   Patient Position at End of Therapy In Bed;Call Light within Reach;Tray Table within Reach;Phone within Reach  (physician at bedside)   Collaboration Comments RN updated   Session Information   Date / Session Number  10/5- 1 (1/4, 10/11)

## 2023-10-06 ENCOUNTER — APPOINTMENT (OUTPATIENT)
Dept: RADIOLOGY | Facility: MEDICAL CENTER | Age: 68
DRG: 291 | End: 2023-10-06
Attending: HOSPITALIST
Payer: MEDICARE

## 2023-10-06 DIAGNOSIS — R93.89 ABNORMAL CT OF THE CHEST: ICD-10-CM

## 2023-10-06 PROBLEM — N17.9 AKI (ACUTE KIDNEY INJURY) (HCC): Status: ACTIVE | Noted: 2023-10-06

## 2023-10-06 PROBLEM — I47.29 NONSUSTAINED VENTRICULAR TACHYCARDIA (HCC): Status: ACTIVE | Noted: 2023-10-06

## 2023-10-06 PROBLEM — Z71.89 ACP (ADVANCE CARE PLANNING): Status: ACTIVE | Noted: 2023-10-06

## 2023-10-06 LAB
ALBUMIN SERPL BCP-MCNC: 2.9 G/DL (ref 3.2–4.9)
ALBUMIN SERPL BCP-MCNC: 3 G/DL (ref 3.2–4.9)
ALBUMIN/GLOB SERPL: 0.8 G/DL
ALBUMIN/GLOB SERPL: 0.9 G/DL
ALP SERPL-CCNC: 52 U/L (ref 30–99)
ALP SERPL-CCNC: 54 U/L (ref 30–99)
ALT SERPL-CCNC: 44 U/L (ref 2–50)
ALT SERPL-CCNC: 50 U/L (ref 2–50)
ANION GAP SERPL CALC-SCNC: 11 MMOL/L (ref 7–16)
ANION GAP SERPL CALC-SCNC: 11 MMOL/L (ref 7–16)
AST SERPL-CCNC: 18 U/L (ref 12–45)
AST SERPL-CCNC: 50 U/L (ref 12–45)
BASE EXCESS BLDA CALC-SCNC: 6 MMOL/L (ref -4–3)
BASOPHILS # BLD AUTO: 0.2 % (ref 0–1.8)
BASOPHILS # BLD: 0.02 K/UL (ref 0–0.12)
BILIRUB SERPL-MCNC: 0.7 MG/DL (ref 0.1–1.5)
BILIRUB SERPL-MCNC: 0.8 MG/DL (ref 0.1–1.5)
BODY TEMPERATURE: 36.3 CENTIGRADE
BUN SERPL-MCNC: 45 MG/DL (ref 8–22)
BUN SERPL-MCNC: 52 MG/DL (ref 8–22)
CALCIUM ALBUM COR SERPL-MCNC: 10 MG/DL (ref 8.5–10.5)
CALCIUM ALBUM COR SERPL-MCNC: 10.4 MG/DL (ref 8.5–10.5)
CALCIUM SERPL-MCNC: 9.2 MG/DL (ref 8.5–10.5)
CALCIUM SERPL-MCNC: 9.5 MG/DL (ref 8.5–10.5)
CHLORIDE SERPL-SCNC: 92 MMOL/L (ref 96–112)
CHLORIDE SERPL-SCNC: 94 MMOL/L (ref 96–112)
CO2 SERPL-SCNC: 30 MMOL/L (ref 20–33)
CO2 SERPL-SCNC: 31 MMOL/L (ref 20–33)
CREAT SERPL-MCNC: 1.84 MG/DL (ref 0.5–1.4)
CREAT SERPL-MCNC: 2.03 MG/DL (ref 0.5–1.4)
EOSINOPHIL # BLD AUTO: 0.08 K/UL (ref 0–0.51)
EOSINOPHIL NFR BLD: 0.9 % (ref 0–6.9)
ERYTHROCYTE [DISTWIDTH] IN BLOOD BY AUTOMATED COUNT: 56.7 FL (ref 35.9–50)
ERYTHROCYTE [DISTWIDTH] IN BLOOD BY AUTOMATED COUNT: 57.4 FL (ref 35.9–50)
GFR SERPLBLD CREATININE-BSD FMLA CKD-EPI: 35 ML/MIN/1.73 M 2
GFR SERPLBLD CREATININE-BSD FMLA CKD-EPI: 40 ML/MIN/1.73 M 2
GLOBULIN SER CALC-MCNC: 3.5 G/DL (ref 1.9–3.5)
GLOBULIN SER CALC-MCNC: 3.8 G/DL (ref 1.9–3.5)
GLUCOSE BLD STRIP.AUTO-MCNC: 143 MG/DL (ref 65–99)
GLUCOSE BLD STRIP.AUTO-MCNC: 169 MG/DL (ref 65–99)
GLUCOSE BLD STRIP.AUTO-MCNC: 198 MG/DL (ref 65–99)
GLUCOSE BLD STRIP.AUTO-MCNC: 227 MG/DL (ref 65–99)
GLUCOSE SERPL-MCNC: 138 MG/DL (ref 65–99)
GLUCOSE SERPL-MCNC: 146 MG/DL (ref 65–99)
HCO3 BLDA-SCNC: 33 MMOL/L (ref 17–25)
HCT VFR BLD AUTO: 56.1 % (ref 42–52)
HCT VFR BLD AUTO: 56.7 % (ref 42–52)
HGB BLD-MCNC: 17.4 G/DL (ref 14–18)
HGB BLD-MCNC: 18 G/DL (ref 14–18)
IMM GRANULOCYTES # BLD AUTO: 0.05 K/UL (ref 0–0.11)
IMM GRANULOCYTES NFR BLD AUTO: 0.5 % (ref 0–0.9)
INHALED O2 FLOW RATE: 3.5 L/MIN
LYMPHOCYTES # BLD AUTO: 1.2 K/UL (ref 1–4.8)
LYMPHOCYTES NFR BLD: 12.9 % (ref 22–41)
MAGNESIUM SERPL-MCNC: 1.8 MG/DL (ref 1.5–2.5)
MCH RBC QN AUTO: 30.2 PG (ref 27–33)
MCH RBC QN AUTO: 30.8 PG (ref 27–33)
MCHC RBC AUTO-ENTMCNC: 31 G/DL (ref 32.3–36.5)
MCHC RBC AUTO-ENTMCNC: 31.7 G/DL (ref 32.3–36.5)
MCV RBC AUTO: 96.9 FL (ref 81.4–97.8)
MCV RBC AUTO: 97.4 FL (ref 81.4–97.8)
MONOCYTES # BLD AUTO: 1.48 K/UL (ref 0–0.85)
MONOCYTES NFR BLD AUTO: 15.9 % (ref 0–13.4)
NEUTROPHILS # BLD AUTO: 6.48 K/UL (ref 1.82–7.42)
NEUTROPHILS NFR BLD: 69.6 % (ref 44–72)
NRBC # BLD AUTO: 0 K/UL
NRBC BLD-RTO: 0 /100 WBC (ref 0–0.2)
PCO2 BLDA: 53.4 MMHG (ref 26–37)
PCO2 TEMP ADJ BLDA: 51.8 MMHG (ref 26–37)
PH BLDA: 7.41 [PH] (ref 7.4–7.5)
PH TEMP ADJ BLDA: 7.42 [PH] (ref 7.4–7.5)
PHOSPHATE SERPL-MCNC: 4.7 MG/DL (ref 2.5–4.5)
PLATELET # BLD AUTO: 286 K/UL (ref 164–446)
PLATELET # BLD AUTO: 302 K/UL (ref 164–446)
PMV BLD AUTO: 9.5 FL (ref 9–12.9)
PMV BLD AUTO: 9.7 FL (ref 9–12.9)
PO2 BLDA: 64.5 MMHG (ref 64–87)
PO2 TEMP ADJ BLDA: 61.5 MMHG (ref 64–87)
POTASSIUM SERPL-SCNC: 5.1 MMOL/L (ref 3.6–5.5)
POTASSIUM SERPL-SCNC: 5.2 MMOL/L (ref 3.6–5.5)
PROT SERPL-MCNC: 6.5 G/DL (ref 6–8.2)
PROT SERPL-MCNC: 6.7 G/DL (ref 6–8.2)
RBC # BLD AUTO: 5.76 M/UL (ref 4.7–6.1)
RBC # BLD AUTO: 5.85 M/UL (ref 4.7–6.1)
SAO2 % BLDA: 92.3 % (ref 93–99)
SODIUM SERPL-SCNC: 133 MMOL/L (ref 135–145)
SODIUM SERPL-SCNC: 136 MMOL/L (ref 135–145)
WBC # BLD AUTO: 10.3 K/UL (ref 4.8–10.8)
WBC # BLD AUTO: 9.3 K/UL (ref 4.8–10.8)

## 2023-10-06 PROCEDURE — 82962 GLUCOSE BLOOD TEST: CPT

## 2023-10-06 PROCEDURE — 84100 ASSAY OF PHOSPHORUS: CPT

## 2023-10-06 PROCEDURE — 76705 ECHO EXAM OF ABDOMEN: CPT

## 2023-10-06 PROCEDURE — 700102 HCHG RX REV CODE 250 W/ 637 OVERRIDE(OP): Performed by: HOSPITALIST

## 2023-10-06 PROCEDURE — A9270 NON-COVERED ITEM OR SERVICE: HCPCS

## 2023-10-06 PROCEDURE — 83735 ASSAY OF MAGNESIUM: CPT

## 2023-10-06 PROCEDURE — A9270 NON-COVERED ITEM OR SERVICE: HCPCS | Performed by: INTERNAL MEDICINE

## 2023-10-06 PROCEDURE — 99497 ADVNCD CARE PLAN 30 MIN: CPT | Performed by: STUDENT IN AN ORGANIZED HEALTH CARE EDUCATION/TRAINING PROGRAM

## 2023-10-06 PROCEDURE — 700102 HCHG RX REV CODE 250 W/ 637 OVERRIDE(OP): Performed by: INTERNAL MEDICINE

## 2023-10-06 PROCEDURE — 85025 COMPLETE CBC W/AUTO DIFF WBC: CPT

## 2023-10-06 PROCEDURE — 700102 HCHG RX REV CODE 250 W/ 637 OVERRIDE(OP)

## 2023-10-06 PROCEDURE — 700105 HCHG RX REV CODE 258: Performed by: INTERNAL MEDICINE

## 2023-10-06 PROCEDURE — 99232 SBSQ HOSP IP/OBS MODERATE 35: CPT | Performed by: INTERNAL MEDICINE

## 2023-10-06 PROCEDURE — 51798 US URINE CAPACITY MEASURE: CPT

## 2023-10-06 PROCEDURE — 80053 COMPREHEN METABOLIC PANEL: CPT

## 2023-10-06 PROCEDURE — 99233 SBSQ HOSP IP/OBS HIGH 50: CPT | Performed by: STUDENT IN AN ORGANIZED HEALTH CARE EDUCATION/TRAINING PROGRAM

## 2023-10-06 PROCEDURE — 36415 COLL VENOUS BLD VENIPUNCTURE: CPT

## 2023-10-06 PROCEDURE — 94660 CPAP INITIATION&MGMT: CPT

## 2023-10-06 PROCEDURE — 700111 HCHG RX REV CODE 636 W/ 250 OVERRIDE (IP): Performed by: INTERNAL MEDICINE

## 2023-10-06 PROCEDURE — 700111 HCHG RX REV CODE 636 W/ 250 OVERRIDE (IP): Mod: JZ

## 2023-10-06 PROCEDURE — 82803 BLOOD GASES ANY COMBINATION: CPT

## 2023-10-06 PROCEDURE — A9270 NON-COVERED ITEM OR SERVICE: HCPCS | Performed by: HOSPITALIST

## 2023-10-06 PROCEDURE — 700111 HCHG RX REV CODE 636 W/ 250 OVERRIDE (IP): Mod: JZ | Performed by: HOSPITALIST

## 2023-10-06 PROCEDURE — 92526 ORAL FUNCTION THERAPY: CPT

## 2023-10-06 PROCEDURE — 85027 COMPLETE CBC AUTOMATED: CPT

## 2023-10-06 PROCEDURE — 770020 HCHG ROOM/CARE - TELE (206)

## 2023-10-06 RX ADMIN — INSULIN HUMAN 2 UNITS: 100 INJECTION, SOLUTION PARENTERAL at 13:08

## 2023-10-06 RX ADMIN — INSULIN HUMAN 3 UNITS: 100 INJECTION, SOLUTION PARENTERAL at 09:14

## 2023-10-06 RX ADMIN — DOCUSATE SODIUM 50 MG AND SENNOSIDES 8.6 MG 2 TABLET: 8.6; 5 TABLET, FILM COATED ORAL at 18:15

## 2023-10-06 RX ADMIN — THIAMINE HYDROCHLORIDE 500 MG: 100 INJECTION, SOLUTION INTRAMUSCULAR; INTRAVENOUS at 05:33

## 2023-10-06 RX ADMIN — OLMESARTAN MEDOXOMIL 20 MG: 20 TABLET, FILM COATED ORAL at 05:26

## 2023-10-06 RX ADMIN — ENOXAPARIN SODIUM 40 MG: 100 INJECTION SUBCUTANEOUS at 18:15

## 2023-10-06 RX ADMIN — AMLODIPINE BESYLATE 10 MG: 10 TABLET ORAL at 05:26

## 2023-10-06 RX ADMIN — INSULIN HUMAN 2 UNITS: 100 INJECTION, SOLUTION PARENTERAL at 21:16

## 2023-10-06 RX ADMIN — METOPROLOL SUCCINATE 100 MG: 100 TABLET, EXTENDED RELEASE ORAL at 05:26

## 2023-10-06 RX ADMIN — ENOXAPARIN SODIUM 40 MG: 100 INJECTION SUBCUTANEOUS at 05:25

## 2023-10-06 RX ADMIN — FUROSEMIDE 40 MG: 10 INJECTION INTRAMUSCULAR; INTRAVENOUS at 05:25

## 2023-10-06 ASSESSMENT — PAIN DESCRIPTION - PAIN TYPE
TYPE: ACUTE PAIN
TYPE: ACUTE PAIN

## 2023-10-06 ASSESSMENT — ENCOUNTER SYMPTOMS
DIZZINESS: 0
FEVER: 0
NAUSEA: 0
VOMITING: 0
CHILLS: 0
SHORTNESS OF BREATH: 1
WEAKNESS: 1
SPUTUM PRODUCTION: 1
COUGH: 1
HEADACHES: 0
DEPRESSION: 0

## 2023-10-06 ASSESSMENT — FIBROSIS 4 INDEX: FIB4 SCORE: 0.61

## 2023-10-06 NOTE — THERAPY
"Speech Language Pathology   Daily Treatment     Patient Name: Blair Celeste  AGE:  68 y.o., SEX:  male  Medical Record #: [id number]  Date of Service: 10/6/2023      Precautions:  Precautions: Fall Risk         Subjective  RN cleared patient for session. Pt received awake, alert, upright in bed, eating apples and cheese sticks with son at bedside. Endorsed coughing/choking on pork rinds a few weeks prior. Reported he felt the most difficulty with pork rinds, and some difficulty with \"small foods.\" Stated doing better this morning.       Assessment  PO trials of water, apples, and cheese sticks observed. Pt demo'd adequate oral bolus acceptance, containment, and clearance. No cough response appreciated across trials. Occasional throat clear noted throughout session. Denied any globus sensation with trials. Endorsed able to swallow all trials without difficulty. Discussed option to complete an instrumental swallow study pending clinical progress due to reports of coughing/choking and PNA, pt and son stated understanding. Provided education regarding general aspiration precaution as well as signs of aspiration, pt and son stated understanding.       Clinical Impressions  Per reported history, patient presents with clinical signs of mild pharyngeal dysphagia, likely acute on chronic related to mildly impacted swallow-breath coordination and history of COPD.  Instrumental swallow study is indicated to rule out aspiration due to reports of choking with food and current PNA. Pt okay to continue oral diet of regular solids with thin liquids in the interim. Please hold PO with any overt s/sx of aspiration or decline in respiratory status with oral intake.       Recommendations  Treatment Completed: Dysphagia Treatment  Consult Referral(s): Gastroenterologist    Dysphagia Treatment  Diet Consistency: Regular solids, Thin/all Liquids  Instrumentation: VFSS (MBSS)   Medication: As tolerated  Supervision: " Independent  Positioning: Fully upright and midline during oral intake, Meals sitting upright in a chair, as tolerated  Risk Management : Small bites/sips, Slow rate of intake, Physical mobility, as tolerated  Oral Care: BID         SLP Treatment Plan  Treatment Plan: Dysphagia Treatment  SLP Frequency: 2x Per Week  Estimated Duration: Until Therapy Goals Met      Anticipated Discharge Needs  Discharge Recommendations: Anticipate that the patient will have no further speech therapy needs after discharge from the hospital (Pending clinical progress)         Patient / Family Goals  Patient / Family Goal #1: I want to find out what's going on  Goal #1 Outcome: Progressing as expected  Short Term Goals  Short Term Goal # 1: Pt will participate in instrumentation to define swallow physioogy and determine ST POC  Goal Outcome # 1: Not targeted this date  Short Term Goal # 2: Pt will consume a diet of regular solids and thin liquids with no overt s/sx of aspiration or decline in respiratory status      Hemal Jacobs, SLP

## 2023-10-06 NOTE — DISCHARGE PLANNING
SNF referral [facility], [place] :Declined     LMSW spoke to justa from [facility] to inform [facility] that they are going to decline due to him being to high acute care. Facility is too small for his needs.

## 2023-10-06 NOTE — THERAPY
"Speech Language Therapy Contact Note    Patient Name: Blair Celeste  Age:  68 y.o., Sex:  male  Medical Record #: [id number]  Today's Date: 10/6/2023    Attempted to complete MBSS study. Pt agreeable, able to be transferred to MBSS chair. Leg and back pain during transfers; RN aware. Pt brought downstairs and several positions were attempted, including lateral and oblique and views were not able to be obtained related to body habitus. Discussed with pt, who reports he is \"not very\" concerned about dysphagia symptoms. Discussed options for OP MBSS should pt have persistent or worsening symptoms; he verbalized agreement. RN aware.        10/06/23 1532   Treatment Variance   Reason For Missed Therapy Non-Medical - Other (Please Comment)   Total Treatment Time   SLP Time Spent Yes   SLP Missed Visit (Mins) 35   Precautions   Precautions Fall Risk   Vitals   O2 (LPM) 4   O2 Delivery Device Silicone Nasal Cannula   Pain 0 - 10 Group   Therapist Pain Assessment Post Activity Pain Same as Prior to Activity   Recommendations   Diet Consistency Regular solids, Thin/all Liquids   Medication As tolerated   Supervision Independent   Positioning Fully upright and midline during oral intake;Meals sitting upright in a chair, as tolerated   Strategies Small bites/sips;Slow rate of intake   Oral Care BID   Consult Referral(s) Gastroenterologist   SLP Treatment Plan   Treatment Plan Dysphagia Treatment;Patient/Family/Caregiver Training   SLP Frequency 2x Per Week   Estimated Duration Until Therapy Goals Met   Short Term Goals   Short Term Goal # 1 Pt will participate in instrumentation to define swallow physioogy and determine ST POC   Goal Outcome # 1 Goal not met   Education Group   Education Provided Dysphagia   Dysphagia Patient Response Patient;Acceptance;Explanation;Verbal Demonstration;Action Demonstration;Reinforcement Needed   Anticipated Discharge Needs   Discharge Recommendations   (Consider OP MBSS with ongoing " symptoms)   Interdisciplinary Plan of Care Collaboration   IDT Collaboration with  Nursing;Speech Therapist   Patient Position at End of Therapy In Bed;Bed Alarm On   Collaboration Comments RN updated

## 2023-10-06 NOTE — CARE PLAN
The patient is Watcher - Medium risk of patient condition declining or worsening    Shift Goals  Clinical Goals: Pt will keep CPAP on while sleeping, remain oriented x4  Patient Goals: rest  Family Goals: GALE    Progress made toward(s) clinical / shift goals: Pt mentation improved since start of shift, Tele sitter in place to ensure CPAP stays on while pt asleep    Patient is not progressing towards the following goals:      Problem: Care Map:  Day 3 Optimal Outcome for the Heart Failure Patient  Goal: Day 3:  Optimal Care of the heart failure patient  Outcome: Progressing  Intervention: For patient's with heart failure exacerbation, identify precipitant (diet, med compliance, etc.) and direct education towards lifestyle changes to prevent exacerbations.  Note: Pt educated on precipitating factors for HF exacerbations, low sodium diet  Intervention: Ensure daily BMP is ordered by provider  Note: BMP ordered and measured daily  Intervention: Daily weight documented.  Use stand up scale if patient able. Compare to previous weight  Note: Daily weights in place  Intervention: Assess edema every shift (peripheral, sacral, periorbital, perineal/scrotal, and abdominal)  Note: Edema assessed q shift

## 2023-10-06 NOTE — PROGRESS NOTES
Monitors notified this RN that pt had 17 bts PSVT up to 176. Pt asymptomatic at this time. On-call Hospitalist, Naa, notified at this time.

## 2023-10-06 NOTE — CARE PLAN
The patient is Watcher - Medium risk of patient condition declining or worsening    Shift Goals  Clinical Goals: Improved respiratory status  Patient Goals: Rest, update  Family Goals: GALE    Progress made toward(s) clinical / shift goals:  SpO2>90 on 5L NC    Problem: Care Map:  Day 2 Optimal Outcome for the Heart Failure Patient  Goal: Day 2:  Optimal Care of the heart failure patient  Outcome: Progressing  Intervention: Ensure daily BMP is ordered by provider  Note: Daily labs collected and reviewed.   Intervention: Daily weight documented.  Use stand up scale if patient able. Compare to previous weight  Note: Daily weight obtained.  Intervention: Assess and document 2 hour post diuretic output  Note: Continent/incontinent, strict I/O charted as able.   Intervention: Document ambulation tolerance (functional assessment) in ADL flowsheet daily  Note: Attempted to mobilize out of bed, unable to stand with assist of 3.   Intervention: Assess edema every shift (peripheral, sacral, periorbital, perineal/scrotal, and abdominal)  Note: BLE edema +3  Intervention: If patient is HFrEF, review for guideline medications (evidence based beta blocker (Toprol XL, carvedilol, bisprolol), ACEI/ARB/ARNI, Aldosterone receptor antagonist).  If not ordered request provider contraindication documentation.  Note: HF medications ordered.   Intervention: Educate patient on HF topics (medication, weight, worseining signs and symptoms, low salt diet, activity) and document in Patient Education  Note: HF education provided.        Patient is not progressing towards the following goals: N/A

## 2023-10-06 NOTE — CONSULTS
Pulmonary Consultation    Date of consult: 10/6/2023    Referring Physician  Samira Mclain M.D.    Reason for Consultation  Right lower lobe consolidation/mass    History of Presenting Illness  68 y.o. male who presented 10/3/2023 with shortness of breath and was being treated for pneumonia.  Patient demonstrated a change in consciousness on 10/4/2023 and was found to be hypercapnic with a P CO2 up to 79.  Patient was moved to the ICU for BiPAP.  He was subsequently downgraded after diuresis and is now on 4 L nasal cannula.  During the course of his work-up, patient had a CT scan which showed a right lower lobe consolidation, concerning for pneumonia versus mass.  Patient does have a significant smoking history.    Code Status  Full Code    Review of Systems   Constitutional:  Negative for chills and fever.   Respiratory:  Positive for cough, sputum production and shortness of breath.    Cardiovascular:  Negative for chest pain.   Gastrointestinal:  Negative for nausea and vomiting.   Neurological:  Positive for weakness. Negative for dizziness and headaches.   Psychiatric/Behavioral:  Negative for depression and suicidal ideas.      Past Medical History   has a past medical history of COPD (chronic obstructive pulmonary disease) (HCC), Diabetes (HCC), and Hypertension.    Surgical History   has a past surgical history that includes other abdominal surgery and appendectomy (1977).    Family History  family history is not on file.    Social History   reports that he quit smoking about 23 years ago. His smoking use included cigars. He started smoking about 51 years ago. He has never used smokeless tobacco. He reports that he does not currently use alcohol. He reports that he does not use drugs.    Medications  Home Medications       Reviewed by Nita Wick (Pharmacy Tech) on 10/04/23 at 0958  Med List Status: Complete     Medication Last Dose Status   allopurinol (ZYLOPRIM) 100 MG Tab UNK Active   dapagliflozin  propanediol (FARXIGA) 10 MG Tab UNK Active   furosemide (LASIX) 40 MG Tab 10/3/2023 Active   metoprolol tartrate (LOPRESSOR) 100 MG Tab 10/3/2023 Active                  Current Facility-Administered Medications   Medication Dose Route Frequency Provider Last Rate Last Admin    amLODIPine (Norvasc) tablet 10 mg  10 mg Oral Q DAY Dallin Foster M.D.   10 mg at 10/06/23 0526    [Held by provider] olmesartan (Benicar) tablet 20 mg  20 mg Oral Q DAY Raul Donahue, D.O.   20 mg at 10/06/23 0526    enoxaparin (Lovenox) inj 40 mg  40 mg Subcutaneous Q12HRS Raul Donahue D.O.   40 mg at 10/06/23 0525    oxyCODONE immediate-release (Roxicodone) tablet 5-10 mg  5-10 mg Oral Q4HRS PRN Raul Donahue D.O.   10 mg at 10/05/23 1636    senna-docusate (Pericolace Or Senokot S) 8.6-50 MG per tablet 2 Tablet  2 Tablet Oral BID Radha Brooks M.D.   2 Tablet at 10/05/23 1636    And    polyethylene glycol/lytes (Miralax) PACKET 1 Packet  1 Packet Oral QDAY PRN Radha Brooks M.D.        And    magnesium hydroxide (Milk Of Magnesia) suspension 30 mL  30 mL Oral QDAY PRN Radah Brooks M.D.        And    bisacodyl (Dulcolax) suppository 10 mg  10 mg Rectal QDAY PRN Radha Brooks M.D.        acetaminophen (Tylenol) tablet 650 mg  650 mg Oral Q6HRS PRN Radha Boroks M.D.   650 mg at 10/05/23 0913    [Held by provider] furosemide (Lasix) injection 40 mg  40 mg Intravenous BID DIURETIC Radha Brooks M.D.   40 mg at 10/06/23 0525    Respiratory Therapy Consult   Nebulization Continuous RT Radha Brooks M.D.        metoprolol SR (Toprol XL) tablet 100 mg  100 mg Oral Q DAY Maida Diallo M.D.   100 mg at 10/06/23 0526    thiamine (B-1) 500 mg in dextrose 5% 100 mL IVPB  500 mg Intravenous DAILY Dallin Foster M.D. 200 mL/hr at 10/06/23 0533 500 mg at 10/06/23 0533    Followed by    [START ON 10/8/2023] thiamine (Vitamin B-1) tablet 100 mg  100 mg Oral DAILY Dallin Foster M.D.         insulin regular (HumuLIN R,NovoLIN R) injection  2-9 Units Subcutaneous 4X/DAY CECILLES Dallin Foster M.D.   3 Units at 10/06/23 0914    And    dextrose 50% (D50W) injection 25 g  25 g Intravenous Q15 MIN PRN Dallin Foster M.D.           Allergies  No Known Allergies    Vital Signs last 24 hours  Temp:  [36.2 °C (97.2 °F)-37 °C (98.6 °F)] 36.5 °C (97.7 °F)  Pulse:  [76-89] 76  Resp:  [16-19] 18  BP: ()/(62-81) 124/73  SpO2:  [90 %-97 %] 91 %    Physical Exam  Vitals and nursing note reviewed.   Constitutional:       Appearance: Normal appearance. He is obese.   HENT:      Head: Normocephalic.   Eyes:      Conjunctiva/sclera: Conjunctivae normal.   Cardiovascular:      Rate and Rhythm: Normal rate and regular rhythm.   Pulmonary:      Effort: Pulmonary effort is normal.      Breath sounds: Normal breath sounds.   Skin:     General: Skin is warm and dry.   Neurological:      General: No focal deficit present.      Mental Status: He is alert and oriented to person, place, and time.       Fluids    Intake/Output Summary (Last 24 hours) at 10/6/2023 1308  Last data filed at 10/6/2023 0600  Gross per 24 hour   Intake 580 ml   Output --   Net 580 ml       Laboratory  Recent Results (from the past 48 hour(s))   POCT glucose device results    Collection Time: 10/04/23  1:14 PM   Result Value Ref Range    POC Glucose, Blood 264 (H) 65 - 99 mg/dL   Comp Metabolic Panel    Collection Time: 10/04/23  1:22 PM   Result Value Ref Range    Sodium 137 135 - 145 mmol/L    Potassium 5.1 3.6 - 5.5 mmol/L    Chloride 94 (L) 96 - 112 mmol/L    Co2 34 (H) 20 - 33 mmol/L    Anion Gap 9.0 7.0 - 16.0    Glucose 233 (H) 65 - 99 mg/dL    Bun 42 (H) 8 - 22 mg/dL    Creatinine 1.01 0.50 - 1.40 mg/dL    Calcium 9.5 8.5 - 10.5 mg/dL    Correct Calcium 9.6 8.5 - 10.5 mg/dL    AST(SGOT) 22 12 - 45 U/L    ALT(SGPT) 101 (H) 2 - 50 U/L    Alkaline Phosphatase 69 30 - 99 U/L    Total Bilirubin 0.5 0.1 - 1.5 mg/dL    Albumin 3.9 3.2  - 4.9 g/dL    Total Protein 7.7 6.0 - 8.2 g/dL    Globulin 3.8 (H) 1.9 - 3.5 g/dL    A-G Ratio 1.0 g/dL   ESTIMATED GFR    Collection Time: 10/04/23  1:22 PM   Result Value Ref Range    GFR (CKD-EPI) 82 >60 mL/min/1.73 m 2   ABG - LAB    Collection Time: 10/04/23  1:23 PM   Result Value Ref Range    Ph 7.23 (LL) 7.40 - 7.50    Pco2 80.0 (HH) 26.0 - 37.0 mmHg    Po2 79.5 64.0 - 87.0 mmHg    O2 Saturation 93.8 93.0 - 99.0 %    Hco3 33 (H) 17 - 25 mmol/L    Base Excess 1 -4 - 3 mmol/L    Body Temp 36.7 Centigrade    O2 Therapy 6L     O2 Therapy 6.0 2.0 - 10.0 L/min    Ph -TC 7.23 (LL) 7.40 - 7.50    Pco2 -TC 79.0 (HH) 26.0 - 37.0 mmHg    Po2 -TC 77.9 64.0 - 87.0 mmHg   AMMONIA    Collection Time: 10/04/23  1:23 PM   Result Value Ref Range    Ammonia 34 11 - 45 umol/L   Prothrombin Time    Collection Time: 10/04/23  3:14 PM   Result Value Ref Range    PT 13.1 12.0 - 14.6 sec    INR 0.98 0.87 - 1.13   EC-ECHOCARDIOGRAM COMPLETE W/O CONT    Collection Time: 10/04/23  3:30 PM   Result Value Ref Range    Eject.Frac. MOD BP 75.65     Eject.Frac. MOD 4C 74.02     Eject.Frac. MOD 2C 76.9     Left Ventrical Ejection Fraction 75    POCT arterial blood gas device results    Collection Time: 10/04/23  4:51 PM   Result Value Ref Range    Ph 7.256 (LL) 7.400 - 7.500    Pco2 77.8 (HH) 26.0 - 37.0 mmHg    Po2 82 64 - 87 mmHg    Tco2 37 (H) 20 - 33 mmol/L    S02 93 93 - 99 %    Hco3 34.6 (H) 17.0 - 25.0 mmol/L    BE 3 -4 - 3 mmol/L    Body Temp 97.3 F degrees    O2 Therapy 50 %    iPF Ratio 164     Ph Temp Nicko 7.266 (LL) 7.400 - 7.500    Pco2 Temp Co 75.3 (HH) 26.0 - 37.0 mmHg    Po2 Temp Cor 78 64 - 87 mmHg    Specimen Arterial     DelSys NIV    POCT glucose device results    Collection Time: 10/04/23  9:06 PM   Result Value Ref Range    POC Glucose, Blood 146 (H) 65 - 99 mg/dL   POCT venous blood gas device results    Collection Time: 10/05/23  2:27 AM   Result Value Ref Range    Ph 7.293 (L) 7.310 - 7.450    Pco2 74.8 (H) 41.0 -  51.0 mmHg    Po2 67 (H) 25 - 40 mmHg    Tco2 39 (H) 20 - 33 mmol/L    SO2 89 %    Hco3 36.3 (H) 24.0 - 28.0 mmol/L    BE 5 (H) -4 - 3 mmol/L    Body Temp 99.3 F degrees    O2 Therapy 50 %    iPF Ratio 134     Ph Temp Correc 7.288 (L) 7.310 - 7.450    Pco2 Temp Nicko 76.1 (H) 41.0 - 51.0 mmHg    Po2 Temp Corre 68 (H) 25 - 40 mmHg    Specimen Venous     DelSys NIV    CBC WITH DIFFERENTIAL    Collection Time: 10/05/23  6:15 AM   Result Value Ref Range    WBC 8.3 4.8 - 10.8 K/uL    RBC 5.74 4.70 - 6.10 M/uL    Hemoglobin 18.0 14.0 - 18.0 g/dL    Hematocrit 56.9 (H) 42.0 - 52.0 %    MCV 99.1 (H) 81.4 - 97.8 fL    MCH 31.4 27.0 - 33.0 pg    MCHC 31.6 (L) 32.3 - 36.5 g/dL    RDW 59.1 (H) 35.9 - 50.0 fL    Platelet Count 261 164 - 446 K/uL    MPV 9.5 9.0 - 12.9 fL    Neutrophils-Polys 74.30 (H) 44.00 - 72.00 %    Lymphocytes 12.10 (L) 22.00 - 41.00 %    Monocytes 12.20 0.00 - 13.40 %    Eosinophils 0.70 0.00 - 6.90 %    Basophils 0.20 0.00 - 1.80 %    Immature Granulocytes 0.50 0.00 - 0.90 %    Nucleated RBC 0.00 0.00 - 0.20 /100 WBC    Neutrophils (Absolute) 6.16 1.82 - 7.42 K/uL    Lymphs (Absolute) 1.00 1.00 - 4.80 K/uL    Monos (Absolute) 1.01 (H) 0.00 - 0.85 K/uL    Eos (Absolute) 0.06 0.00 - 0.51 K/uL    Baso (Absolute) 0.02 0.00 - 0.12 K/uL    Immature Granulocytes (abs) 0.04 0.00 - 0.11 K/uL    NRBC (Absolute) 0.00 K/uL   Comp Metabolic Panel    Collection Time: 10/05/23  6:15 AM   Result Value Ref Range    Sodium 139 135 - 145 mmol/L    Potassium 5.1 3.6 - 5.5 mmol/L    Chloride 95 (L) 96 - 112 mmol/L    Co2 30 20 - 33 mmol/L    Anion Gap 14.0 7.0 - 16.0    Glucose 144 (H) 65 - 99 mg/dL    Bun 34 (H) 8 - 22 mg/dL    Creatinine 0.80 0.50 - 1.40 mg/dL    Calcium 9.1 8.5 - 10.5 mg/dL    Correct Calcium 9.7 8.5 - 10.5 mg/dL    AST(SGOT) 20 12 - 45 U/L    ALT(SGPT) 69 (H) 2 - 50 U/L    Alkaline Phosphatase 54 30 - 99 U/L    Total Bilirubin 0.6 0.1 - 1.5 mg/dL    Albumin 3.3 3.2 - 4.9 g/dL    Total Protein 6.7 6.0 - 8.2  g/dL    Globulin 3.4 1.9 - 3.5 g/dL    A-G Ratio 1.0 g/dL   MAGNESIUM    Collection Time: 10/05/23  6:15 AM   Result Value Ref Range    Magnesium 1.7 1.5 - 2.5 mg/dL   PHOSPHORUS    Collection Time: 10/05/23  6:15 AM   Result Value Ref Range    Phosphorus 4.0 2.5 - 4.5 mg/dL   HEPATITIS PANEL ACUTE(4 COMPONENTS)    Collection Time: 10/05/23  6:15 AM   Result Value Ref Range    Hepatitis B Surface Antigen Non-Reactive Non-Reactive    Hepatitis B Cors Ab,IgM Non-Reactive Non-Reactive    Hepatitis A Virus Ab, IgM Non-Reactive Non-Reactive    Hepatitis C Antibody Non-Reactive Non-Reactive   PROCALCITONIN    Collection Time: 10/05/23  6:15 AM   Result Value Ref Range    Procalcitonin 0.13 <0.25 ng/mL   ESTIMATED GFR    Collection Time: 10/05/23  6:15 AM   Result Value Ref Range    GFR (CKD-EPI) 97 >60 mL/min/1.73 m 2   POCT glucose device results    Collection Time: 10/05/23  6:21 AM   Result Value Ref Range    POC Glucose, Blood 160 (H) 65 - 99 mg/dL   POCT glucose device results    Collection Time: 10/05/23 11:31 AM   Result Value Ref Range    POC Glucose, Blood 243 (H) 65 - 99 mg/dL   POCT glucose device results    Collection Time: 10/05/23  6:27 PM   Result Value Ref Range    POC Glucose, Blood 164 (H) 65 - 99 mg/dL   POCT glucose device results    Collection Time: 10/05/23  9:08 PM   Result Value Ref Range    POC Glucose, Blood 201 (H) 65 - 99 mg/dL   CBC WITH DIFFERENTIAL    Collection Time: 10/06/23  3:48 AM   Result Value Ref Range    WBC 9.3 4.8 - 10.8 K/uL    RBC 5.76 4.70 - 6.10 M/uL    Hemoglobin 17.4 14.0 - 18.0 g/dL    Hematocrit 56.1 (H) 42.0 - 52.0 %    MCV 97.4 81.4 - 97.8 fL    MCH 30.2 27.0 - 33.0 pg    MCHC 31.0 (L) 32.3 - 36.5 g/dL    RDW 57.4 (H) 35.9 - 50.0 fL    Platelet Count 302 164 - 446 K/uL    MPV 9.5 9.0 - 12.9 fL    Neutrophils-Polys 69.60 44.00 - 72.00 %    Lymphocytes 12.90 (L) 22.00 - 41.00 %    Monocytes 15.90 (H) 0.00 - 13.40 %    Eosinophils 0.90 0.00 - 6.90 %    Basophils 0.20 0.00 -  1.80 %    Immature Granulocytes 0.50 0.00 - 0.90 %    Nucleated RBC 0.00 0.00 - 0.20 /100 WBC    Neutrophils (Absolute) 6.48 1.82 - 7.42 K/uL    Lymphs (Absolute) 1.20 1.00 - 4.80 K/uL    Monos (Absolute) 1.48 (H) 0.00 - 0.85 K/uL    Eos (Absolute) 0.08 0.00 - 0.51 K/uL    Baso (Absolute) 0.02 0.00 - 0.12 K/uL    Immature Granulocytes (abs) 0.05 0.00 - 0.11 K/uL    NRBC (Absolute) 0.00 K/uL   Comp Metabolic Panel    Collection Time: 10/06/23  3:48 AM   Result Value Ref Range    Sodium 136 135 - 145 mmol/L    Potassium 5.1 3.6 - 5.5 mmol/L    Chloride 94 (L) 96 - 112 mmol/L    Co2 31 20 - 33 mmol/L    Anion Gap 11.0 7.0 - 16.0    Glucose 138 (H) 65 - 99 mg/dL    Bun 45 (H) 8 - 22 mg/dL    Creatinine 1.84 (H) 0.50 - 1.40 mg/dL    Calcium 9.2 8.5 - 10.5 mg/dL    Correct Calcium 10.0 8.5 - 10.5 mg/dL    AST(SGOT) 18 12 - 45 U/L    ALT(SGPT) 50 2 - 50 U/L    Alkaline Phosphatase 52 30 - 99 U/L    Total Bilirubin 0.8 0.1 - 1.5 mg/dL    Albumin 3.0 (L) 3.2 - 4.9 g/dL    Total Protein 6.5 6.0 - 8.2 g/dL    Globulin 3.5 1.9 - 3.5 g/dL    A-G Ratio 0.9 g/dL   PHOSPHORUS    Collection Time: 10/06/23  3:48 AM   Result Value Ref Range    Phosphorus 4.7 (H) 2.5 - 4.5 mg/dL   MAGNESIUM    Collection Time: 10/06/23  3:48 AM   Result Value Ref Range    Magnesium 1.8 1.5 - 2.5 mg/dL   ESTIMATED GFR    Collection Time: 10/06/23  3:48 AM   Result Value Ref Range    GFR (CKD-EPI) 40 (A) >60 mL/min/1.73 m 2   POCT glucose device results    Collection Time: 10/06/23  9:10 AM   Result Value Ref Range    POC Glucose, Blood 227 (H) 65 - 99 mg/dL       Imaging  US-ABDOMEN LTD (SOFT TISSUE)   Final Result      No ascites is visualized.      DX-CHEST-PORTABLE (1 VIEW)   Final Result         1.  Linear densities in the right midlung and left lung base suggesting atelectasis or infiltrate.   2.  Cardiomegaly      EC-ECHOCARDIOGRAM COMPLETE W/O CONT   Final Result      CT-CTA NECK WITH & W/O-POST PROCESSING   Final Result      Atherosclerosis of  the carotid bifurcations and bulbs without hemodynamically significant stenosis.      Patent vertebral arteries.      CT-CTA HEAD WITH & W/O-POST PROCESS   Final Result      No large vessel occlusion or hemodynamically significant stenosis or aneurysm.      CT-CEREBRAL PERFUSION ANALYSIS   Final Result      1.  Small focus of potentially reversible ischemia in the left parietal lobe.      CT-HEAD W/O   Final Result      No acute intracranial abnormality.                  CT-CTA CHEST PULMONARY ARTERY W/ RECONS   Final Result         1.  No large central pulmonary embolus is appreciated, evaluation of the subsegmental branches is essentially nondiagnostic due to motion artifacts. Additional imaging would be required for definitive exclusion of small distal pulmonary emboli.   2.  Masslike consolidation in the right lower lobe, could represent round atelectasis or infiltrate however appearance is concerning for pulmonary mass. Should be considered neoplastic less frequent otherwise.   3.  Hazy right lower lobe opacities suggests component of superimposed infiltrate.   4.  Trace right pleural effusion.   5.  Mediastinal and right hilar lymph nodes, appearance concerning for possible metastatic disease given pulmonary finding.   6.  Fluid in the left upper quadrant adjacent to the spleen, density most typical of ascites   7.  Irregular hepatic contour compatible with changes of cirrhosis.   8.  Atherosclerosis and atherosclerotic coronary artery disease.   9.  Pulmonary nodule, see nodule follow-up recommendations below.      Fleischner Society pulmonary nodule recommendations:   Low and High Risk: Consider CT at 3 months, PET/CT, or tissue sampling.      Low Risk - Minimal or absent history of smoking and of other known risk factors.      High Risk - History of smoking or of other known risk factors.      Note: These recommendations do not apply to lung cancer screening, patients with immunosuppression, or patients  with known primary cancer.      Fleischner Society 2017 Guidelines for Management of Incidentally Detected Pulmonary Nodules in Adults         MR-BRAIN-W/O    (Results Pending)   DX-ESOPHAGUS - [id number]    (Results Pending)       Assessment/Plan  #Acute hypoxemic respiratory failure, suspect that there is a component of chronic hypoxemic respiratory failure in the setting of polycythemia  #Acute hypercapnic respiratory failure, suspect that there is some component of chronic hypercapnic respiratory failure due to elevated bicarbonate on admission  #Acute on chronic right heart failure, patient receiving diuresis and improving  #Right lower lobe consolidation versus mass    Titrate O2 to maintain sats 88-92%  Continue CPAP, patient should continue this at home and he says that he has a machine at home  Status post antibiotics  Home O2 evaluation should be completed prior to discharge    I have arranged for patient to have a repeat CT scan and follow-up in clinic to better assess this right lower lobe consolidation versus mass    We will sign off. Please do not hesitate to contact us if we can be of any further assistance. I am most easily reached via [employer] secure messaging application.    Lia Shaikh, DO   Pulmonary and Critical Care

## 2023-10-06 NOTE — DISCHARGE PLANNING
Received choice form at: 8614  Agency/Facility name: [facility]  Referral sent per choice form at:  [id number]    Sent the referral through Communication Management to the following fax :[phone]

## 2023-10-06 NOTE — PROGRESS NOTES
Monitor Summary    Rhythm: SR 82-90    Ectopy: O pac, R pvc, 17 bts PSVT up to 176    Intervals: 15/10/37

## 2023-10-06 NOTE — PROGRESS NOTES
Assumed care of patient, bedside report received from night shift RN. CPAP in place.  Call light within reach and fall precautions in place. Patient instructed to call for assistance. No other needs at this time.

## 2023-10-06 NOTE — PROGRESS NOTES
Monitor check called for 19 beats of VTACH. Pt asymptomatic. On-call Hospitalist, Naa, notified at this time.

## 2023-10-06 NOTE — PROGRESS NOTES
Bedside report received from TAHIR Martell. Pt on CPAP at this time. Patient A&O x 4, very fatigued and forgetful. Pt does not complain of pain at this time. POC discussed with patient. Patient verbalizes understanding. Call light and belongings within reach. Bed locked in lowest position, alarm and fall precautions in place.

## 2023-10-06 NOTE — CARE PLAN
The patient is Stable - Low risk of patient condition declining or worsening    Shift Goals  Clinical Goals: (P) Oxygen sats, CPAP while sleeping  Patient Goals: (P) rest  Family Goals: (P) NA    Progress made toward(s) clinical / shift goals:   Problem: Fall Risk  Goal: Patient will remain free from falls  Outcome: Progressing  Note: Bed alarm in place.      Problem: Respiratory  Goal: Patient will achieve/maintain optimum respiratory ventilation and gas exchange  Outcome: Progressing  Note: Pt utilized CPAP while sleeping and remained on 5L O2 NC while awake.       Patient is not progressing towards the following goals:

## 2023-10-07 ENCOUNTER — APPOINTMENT (OUTPATIENT)
Dept: RADIOLOGY | Facility: MEDICAL CENTER | Age: 68
DRG: 291 | End: 2023-10-07
Attending: INTERNAL MEDICINE
Payer: MEDICARE

## 2023-10-07 LAB
ALBUMIN SERPL BCP-MCNC: 3 G/DL (ref 3.2–4.9)
ALBUMIN/GLOB SERPL: 0.8 G/DL
ALP SERPL-CCNC: 52 U/L (ref 30–99)
ALT SERPL-CCNC: 44 U/L (ref 2–50)
ANION GAP SERPL CALC-SCNC: 11 MMOL/L (ref 7–16)
APPEARANCE UR: ABNORMAL
AST SERPL-CCNC: 56 U/L (ref 12–45)
BACTERIA #/AREA URNS HPF: NEGATIVE /HPF
BASOPHILS # BLD AUTO: 0.3 % (ref 0–1.8)
BASOPHILS # BLD: 0.03 K/UL (ref 0–0.12)
BILIRUB SERPL-MCNC: 0.9 MG/DL (ref 0.1–1.5)
BILIRUB UR QL STRIP.AUTO: NEGATIVE
BUN SERPL-MCNC: 54 MG/DL (ref 8–22)
CALCIUM ALBUM COR SERPL-MCNC: 9.9 MG/DL (ref 8.5–10.5)
CALCIUM SERPL-MCNC: 9.1 MG/DL (ref 8.5–10.5)
CHLORIDE SERPL-SCNC: 92 MMOL/L (ref 96–112)
CO2 SERPL-SCNC: 31 MMOL/L (ref 20–33)
COLOR UR: YELLOW
CREAT SERPL-MCNC: 2.25 MG/DL (ref 0.5–1.4)
CREAT UR-MCNC: 96.65 MG/DL
CREAT UR-MCNC: 98.41 MG/DL
EOSINOPHIL # BLD AUTO: 0.08 K/UL (ref 0–0.51)
EOSINOPHIL NFR BLD: 0.8 % (ref 0–6.9)
EPI CELLS #/AREA URNS HPF: ABNORMAL /HPF
ERYTHROCYTE [DISTWIDTH] IN BLOOD BY AUTOMATED COUNT: 56 FL (ref 35.9–50)
GFR SERPLBLD CREATININE-BSD FMLA CKD-EPI: 31 ML/MIN/1.73 M 2
GLOBULIN SER CALC-MCNC: 3.6 G/DL (ref 1.9–3.5)
GLUCOSE BLD STRIP.AUTO-MCNC: 153 MG/DL (ref 65–99)
GLUCOSE BLD STRIP.AUTO-MCNC: 172 MG/DL (ref 65–99)
GLUCOSE BLD STRIP.AUTO-MCNC: 178 MG/DL (ref 65–99)
GLUCOSE BLD STRIP.AUTO-MCNC: 190 MG/DL (ref 65–99)
GLUCOSE SERPL-MCNC: 141 MG/DL (ref 65–99)
GLUCOSE UR STRIP.AUTO-MCNC: NEGATIVE MG/DL
HCT VFR BLD AUTO: 55.1 % (ref 42–52)
HGB BLD-MCNC: 17.8 G/DL (ref 14–18)
HYALINE CASTS #/AREA URNS LPF: ABNORMAL /LPF
IMM GRANULOCYTES # BLD AUTO: 0.06 K/UL (ref 0–0.11)
IMM GRANULOCYTES NFR BLD AUTO: 0.6 % (ref 0–0.9)
KETONES UR STRIP.AUTO-MCNC: NEGATIVE MG/DL
LEUKOCYTE ESTERASE UR QL STRIP.AUTO: ABNORMAL
LYMPHOCYTES # BLD AUTO: 1.16 K/UL (ref 1–4.8)
LYMPHOCYTES NFR BLD: 11.5 % (ref 22–41)
MAGNESIUM SERPL-MCNC: 1.9 MG/DL (ref 1.5–2.5)
MCH RBC QN AUTO: 30.9 PG (ref 27–33)
MCHC RBC AUTO-ENTMCNC: 32.3 G/DL (ref 32.3–36.5)
MCV RBC AUTO: 95.7 FL (ref 81.4–97.8)
MICRO URNS: ABNORMAL
MICROALBUMIN UR-MCNC: 20.8 MG/DL
MICROALBUMIN/CREAT UR: 211 MG/G (ref 0–30)
MONOCYTES # BLD AUTO: 1.54 K/UL (ref 0–0.85)
MONOCYTES NFR BLD AUTO: 15.2 % (ref 0–13.4)
NEUTROPHILS # BLD AUTO: 7.24 K/UL (ref 1.82–7.42)
NEUTROPHILS NFR BLD: 71.6 % (ref 44–72)
NITRITE UR QL STRIP.AUTO: NEGATIVE
NRBC # BLD AUTO: 0 K/UL
NRBC BLD-RTO: 0 /100 WBC (ref 0–0.2)
PH UR STRIP.AUTO: 5.5 [PH] (ref 5–8)
PHOSPHATE SERPL-MCNC: 4 MG/DL (ref 2.5–4.5)
PLATELET # BLD AUTO: 315 K/UL (ref 164–446)
PMV BLD AUTO: 9.2 FL (ref 9–12.9)
POTASSIUM SERPL-SCNC: 5 MMOL/L (ref 3.6–5.5)
PROT SERPL-MCNC: 6.6 G/DL (ref 6–8.2)
PROT UR QL STRIP: 100 MG/DL
PROT UR-MCNC: 65 MG/DL (ref 0–15)
PROT/CREAT UR: 673 MG/G (ref 15–68)
RBC # BLD AUTO: 5.76 M/UL (ref 4.7–6.1)
RBC # URNS HPF: ABNORMAL /HPF
RBC UR QL AUTO: ABNORMAL
SODIUM SERPL-SCNC: 134 MMOL/L (ref 135–145)
SODIUM UR-SCNC: 41 MMOL/L
SP GR UR STRIP.AUTO: 1.01
UROBILINOGEN UR STRIP.AUTO-MCNC: 1 MG/DL
WBC # BLD AUTO: 10.1 K/UL (ref 4.8–10.8)
WBC #/AREA URNS HPF: ABNORMAL /HPF

## 2023-10-07 PROCEDURE — A9270 NON-COVERED ITEM OR SERVICE: HCPCS | Performed by: INTERNAL MEDICINE

## 2023-10-07 PROCEDURE — 99233 SBSQ HOSP IP/OBS HIGH 50: CPT | Performed by: STUDENT IN AN ORGANIZED HEALTH CARE EDUCATION/TRAINING PROGRAM

## 2023-10-07 PROCEDURE — 700102 HCHG RX REV CODE 250 W/ 637 OVERRIDE(OP): Performed by: HOSPITALIST

## 2023-10-07 PROCEDURE — A9270 NON-COVERED ITEM OR SERVICE: HCPCS

## 2023-10-07 PROCEDURE — 36415 COLL VENOUS BLD VENIPUNCTURE: CPT

## 2023-10-07 PROCEDURE — 700102 HCHG RX REV CODE 250 W/ 637 OVERRIDE(OP)

## 2023-10-07 PROCEDURE — 84100 ASSAY OF PHOSPHORUS: CPT

## 2023-10-07 PROCEDURE — 70551 MRI BRAIN STEM W/O DYE: CPT

## 2023-10-07 PROCEDURE — 76775 US EXAM ABDO BACK WALL LIM: CPT

## 2023-10-07 PROCEDURE — 700111 HCHG RX REV CODE 636 W/ 250 OVERRIDE (IP): Performed by: INTERNAL MEDICINE

## 2023-10-07 PROCEDURE — 80053 COMPREHEN METABOLIC PANEL: CPT

## 2023-10-07 PROCEDURE — 700111 HCHG RX REV CODE 636 W/ 250 OVERRIDE (IP): Mod: JZ | Performed by: HOSPITALIST

## 2023-10-07 PROCEDURE — 82570 ASSAY OF URINE CREATININE: CPT

## 2023-10-07 PROCEDURE — 81001 URINALYSIS AUTO W/SCOPE: CPT

## 2023-10-07 PROCEDURE — 82962 GLUCOSE BLOOD TEST: CPT

## 2023-10-07 PROCEDURE — 84156 ASSAY OF PROTEIN URINE: CPT

## 2023-10-07 PROCEDURE — 51798 US URINE CAPACITY MEASURE: CPT

## 2023-10-07 PROCEDURE — 700102 HCHG RX REV CODE 250 W/ 637 OVERRIDE(OP): Performed by: INTERNAL MEDICINE

## 2023-10-07 PROCEDURE — 99222 1ST HOSP IP/OBS MODERATE 55: CPT | Performed by: INTERNAL MEDICINE

## 2023-10-07 PROCEDURE — A9270 NON-COVERED ITEM OR SERVICE: HCPCS | Performed by: HOSPITALIST

## 2023-10-07 PROCEDURE — 85025 COMPLETE CBC W/AUTO DIFF WBC: CPT

## 2023-10-07 PROCEDURE — 87086 URINE CULTURE/COLONY COUNT: CPT

## 2023-10-07 PROCEDURE — 82043 UR ALBUMIN QUANTITATIVE: CPT

## 2023-10-07 PROCEDURE — 700105 HCHG RX REV CODE 258: Performed by: INTERNAL MEDICINE

## 2023-10-07 PROCEDURE — 84300 ASSAY OF URINE SODIUM: CPT

## 2023-10-07 PROCEDURE — 770020 HCHG ROOM/CARE - TELE (206)

## 2023-10-07 PROCEDURE — 94660 CPAP INITIATION&MGMT: CPT

## 2023-10-07 PROCEDURE — 83735 ASSAY OF MAGNESIUM: CPT

## 2023-10-07 RX ORDER — HYDROXYZINE HYDROCHLORIDE 25 MG/1
25 TABLET, FILM COATED ORAL ONCE
Status: DISCONTINUED | OUTPATIENT
Start: 2023-10-07 | End: 2023-10-07

## 2023-10-07 RX ORDER — HYDROXYZINE HYDROCHLORIDE 25 MG/1
25 TABLET, FILM COATED ORAL
Status: COMPLETED | OUTPATIENT
Start: 2023-10-07 | End: 2023-10-07

## 2023-10-07 RX ADMIN — INSULIN HUMAN 2 UNITS: 100 INJECTION, SOLUTION PARENTERAL at 13:56

## 2023-10-07 RX ADMIN — INSULIN HUMAN 2 UNITS: 100 INJECTION, SOLUTION PARENTERAL at 09:32

## 2023-10-07 RX ADMIN — INSULIN HUMAN 2 UNITS: 100 INJECTION, SOLUTION PARENTERAL at 18:28

## 2023-10-07 RX ADMIN — DOCUSATE SODIUM 50 MG AND SENNOSIDES 8.6 MG 2 TABLET: 8.6; 5 TABLET, FILM COATED ORAL at 17:01

## 2023-10-07 RX ADMIN — OXYCODONE HYDROCHLORIDE 5 MG: 5 TABLET ORAL at 20:57

## 2023-10-07 RX ADMIN — HYDROXYZINE HYDROCHLORIDE 25 MG: 25 TABLET, FILM COATED ORAL at 20:57

## 2023-10-07 RX ADMIN — AMLODIPINE BESYLATE 10 MG: 10 TABLET ORAL at 05:38

## 2023-10-07 RX ADMIN — DOCUSATE SODIUM 50 MG AND SENNOSIDES 8.6 MG 2 TABLET: 8.6; 5 TABLET, FILM COATED ORAL at 05:39

## 2023-10-07 RX ADMIN — ENOXAPARIN SODIUM 40 MG: 100 INJECTION SUBCUTANEOUS at 17:01

## 2023-10-07 RX ADMIN — ENOXAPARIN SODIUM 40 MG: 100 INJECTION SUBCUTANEOUS at 05:38

## 2023-10-07 RX ADMIN — METOPROLOL SUCCINATE 100 MG: 100 TABLET, EXTENDED RELEASE ORAL at 05:38

## 2023-10-07 RX ADMIN — INSULIN HUMAN 2 UNITS: 100 INJECTION, SOLUTION PARENTERAL at 20:36

## 2023-10-07 RX ADMIN — THIAMINE HYDROCHLORIDE 500 MG: 100 INJECTION, SOLUTION INTRAMUSCULAR; INTRAVENOUS at 05:44

## 2023-10-07 ASSESSMENT — COGNITIVE AND FUNCTIONAL STATUS - GENERAL
EATING MEALS: A LITTLE
TOILETING: A LOT
TURNING FROM BACK TO SIDE WHILE IN FLAT BAD: A LOT
HELP NEEDED FOR BATHING: A LOT
MOVING TO AND FROM BED TO CHAIR: A LOT
STANDING UP FROM CHAIR USING ARMS: A LOT
DRESSING REGULAR UPPER BODY CLOTHING: A LOT
PERSONAL GROOMING: A LITTLE
DRESSING REGULAR LOWER BODY CLOTHING: A LOT
WALKING IN HOSPITAL ROOM: TOTAL
DAILY ACTIVITIY SCORE: 14
MOBILITY SCORE: 9
SUGGESTED CMS G CODE MODIFIER MOBILITY: CM
SUGGESTED CMS G CODE MODIFIER DAILY ACTIVITY: CK
CLIMB 3 TO 5 STEPS WITH RAILING: TOTAL
MOVING FROM LYING ON BACK TO SITTING ON SIDE OF FLAT BED: UNABLE

## 2023-10-07 ASSESSMENT — FIBROSIS 4 INDEX: FIB4 SCORE: 1.77

## 2023-10-07 ASSESSMENT — PAIN DESCRIPTION - PAIN TYPE
TYPE: ACUTE PAIN;CHRONIC PAIN
TYPE: ACUTE PAIN;CHRONIC PAIN

## 2023-10-07 NOTE — PROGRESS NOTES
[facility] Medicine Daily Progress Note    Date of Service  10/6/2023    Chief Complaint  Blair Celeste is a 68 y.o. male admitted 10/3/2023 with shortness of breath    Hospital Course  68 y.o. male who presented 10/3/2023 with a PMHx of ETOH abuse, tobacco abuse, DM, HTN, BMI 43, COPD.  Presented 10/4 with SOB.  CTPA neg for PE but did show large LLL infiltrate.  Incidentally noted to have a pulmonary mass.  Admitted to ICU on BiPAP, Abx's.  ETOH cirrhosis.  Patient transferred out of ICU 10/5      Interval Problem Update  Patient was seen and examined at bedside  AO times 2-3  Drowsy but able to stay awake to answer questions  Care plan was discussed and updated with the wife at the bedside    Masslike consolidation in the right lower lobe,     7L> Bipap > 5L  19 beats of VTACH. Pt asymptomatic  New JOSLYN, Cre 0.8> 1.84, hold diuretics and ARBs  R heart failure, BNP 2965  ECHO ef 70%, right ventricle is dilated with reduced right ventricular systolic function.  Distended abdomen -ultrasound negative for ascites    I have discussed this patient's plan of care and discharge plan at IDT rounds today with Case Management, Nursing, Nursing leadership, and other members of the IDT team.    Consultants/Specialty      Code Status  Full Code    Disposition  The patient is not medically cleared for discharge to home or a post-acute facility.      I have placed the appropriate orders for post-discharge needs.    Review of Systems  All 12 systems were reviewed and negative except as mentioned above       Physical Exam  Temp:  [36.2 °C (97.2 °F)-36.6 °C (97.9 °F)] 36.6 °C (97.9 °F)  Pulse:  [76-89] 82  Resp:  [16-18] 18  BP: ()/(65-81) 125/75  SpO2:  [90 %-96 %] 96 %    Physical Exam  Constitutional:       Appearance: He is ill-appearing.   HENT:      Head: Normocephalic.      Mouth/Throat:      Mouth: Mucous membranes are moist.   Eyes:      Extraocular Movements: Extraocular movements intact.      Conjunctiva/sclera:  Conjunctivae normal.   Cardiovascular:      Rate and Rhythm: Normal rate and regular rhythm.      Pulses: Normal pulses.   Pulmonary:      Effort: Pulmonary effort is normal.      Comments: Decrease breathing sounds  Abdominal:      General: Bowel sounds are normal. There is distension.      Palpations: Abdomen is soft.      Tenderness: There is no abdominal tenderness.   Musculoskeletal:         General: No swelling or tenderness.      Cervical back: Normal range of motion and neck supple.   Skin:     General: Skin is warm.   Neurological:      Mental Status: He is alert. He is disoriented.      Comments: Drowsy, AO times 2-3   Psychiatric:         Mood and Affect: Mood normal.         Fluids    Intake/Output Summary (Last 24 hours) at 10/6/2023 1751  Last data filed at 10/6/2023 0600  Gross per 24 hour   Intake 580 ml   Output --   Net 580 ml       Laboratory  Recent Labs     10/04/23  0423 10/05/23  0615 10/06/23  0348   WBC 11.8* 8.3 9.3   RBC 6.40* 5.74 5.76   HEMOGLOBIN 19.3* 18.0 17.4   HEMATOCRIT 62.4* 56.9* 56.1*   MCV 97.5 99.1* 97.4   MCH 30.2 31.4 30.2   MCHC 30.9* 31.6* 31.0*   RDW 58.7* 59.1* 57.4*   PLATELETCT 274 261 302   MPV 9.5 9.5 9.5     Recent Labs     10/04/23  1322 10/05/23  0615 10/06/23  0348   SODIUM 137 139 136   POTASSIUM 5.1 5.1 5.1   CHLORIDE 94* 95* 94*   CO2 34* 30 31   GLUCOSE 233* 144* 138*   BUN 42* 34* 45*   CREATININE 1.01 0.80 1.84*   CALCIUM 9.5 9.1 9.2     Recent Labs     10/04/23  1514   INR 0.98         Recent Labs     10/04/23  0423   TRIGLYCERIDE 124   HDL 26*   LDL 63       Imaging  US-ABDOMEN LTD (SOFT TISSUE)   Final Result      No ascites is visualized.      DX-CHEST-PORTABLE (1 VIEW)   Final Result         1.  Linear densities in the right midlung and left lung base suggesting atelectasis or infiltrate.   2.  Cardiomegaly      EC-ECHOCARDIOGRAM COMPLETE W/O CONT   Final Result      CT-CTA NECK WITH & W/O-POST PROCESSING   Final Result      Atherosclerosis of the  carotid bifurcations and bulbs without hemodynamically significant stenosis.      Patent vertebral arteries.      CT-CTA HEAD WITH & W/O-POST PROCESS   Final Result      No large vessel occlusion or hemodynamically significant stenosis or aneurysm.      CT-CEREBRAL PERFUSION ANALYSIS   Final Result      1.  Small focus of potentially reversible ischemia in the left parietal lobe.      CT-HEAD W/O   Final Result      No acute intracranial abnormality.                  CT-CTA CHEST PULMONARY ARTERY W/ RECONS   Final Result         1.  No large central pulmonary embolus is appreciated, evaluation of the subsegmental branches is essentially nondiagnostic due to motion artifacts. Additional imaging would be required for definitive exclusion of small distal pulmonary emboli.   2.  Masslike consolidation in the right lower lobe, could represent round atelectasis or infiltrate however appearance is concerning for pulmonary mass. Should be considered neoplastic less frequent otherwise.   3.  Hazy right lower lobe opacities suggests component of superimposed infiltrate.   4.  Trace right pleural effusion.   5.  Mediastinal and right hilar lymph nodes, appearance concerning for possible metastatic disease given pulmonary finding.   6.  Fluid in the left upper quadrant adjacent to the spleen, density most typical of ascites   7.  Irregular hepatic contour compatible with changes of cirrhosis.   8.  Atherosclerosis and atherosclerotic coronary artery disease.   9.  Pulmonary nodule, see nodule follow-up recommendations below.      Fleischner Society pulmonary nodule recommendations:   Low and High Risk: Consider CT at 3 months, PET/CT, or tissue sampling.      Low Risk - Minimal or absent history of smoking and of other known risk factors.      High Risk - History of smoking or of other known risk factors.      Note: These recommendations do not apply to lung cancer screening, patients with immunosuppression, or patients with  known primary cancer.      Fleischner Society 2017 Guidelines for Management of Incidentally Detected Pulmonary Nodules in Adults         MR-BRAIN-W/O    (Results Pending)        Assessment/Plan  * Acute on chronic respiratory failure with hypoxia and hypercapnia (HCC)- (present on admission)  Assessment & Plan  In setting of untreated RAYMOND  R heart failure +/- pneumonia, possible COPD  Responding to diuresis  Cont O2/RT protocols    JOSLYN (acute kidney injury) (HCC)  Assessment & Plan  New JOSLYN, Cre 0.8> 1.84,   Likely due to overdiuresis and IV contrast    hold diuretics and ARBs  Monitor  Renal dose medications  Avoid nephrotoxic agent    ACP (advance care planning)  Assessment & Plan  Patient is drowsy and confused. I discussed advance care planning with the the wife at bedside, including diagnosis, prognosis, plan of care, risks and benefits of any therapies that could be offered.   We discussed regarding CODE STATUS, CPR and intubation with mechanical ventilation, discussed regarding risk and benefits. The wife is willing to  have all life sustaining efforts. Continue full code.  ACP: 16min    Nonsustained ventricular tachycardia (HCC)  Assessment & Plan  19 beats of VTACH. Pt asymptomatic  Optimize electrolytes, potassium over 4 and mag over 2  Continue telemetry monitor  Continue metoprolol    Obstructive sleep apnea  Assessment & Plan  CPAP while in house  See if we can arrange for home    Type 2 diabetes mellitus (HCC)  Assessment & Plan  A1c 8.5   On dapagliflozin as outpt  Covering with SSI while in house  Add glargine if needed    Cirrhosis (HCC)  Assessment & Plan  Likely ETOH related  Component of fatty liver certainly possible  Check hep panel  Will need outpt follow up  Reports he's quit drinking; counseled him today on need for sobriety    Alcohol abuse  Assessment & Plan  Reports his last drink was 2 wks ago  Watch for signs of withdrawal    Acute encephalopathy  Assessment & Plan  Acute metabolic  encephalopathy since resolved    Acute on chronic right-sided heart failure (HCC)  Assessment & Plan  TTE showing LVEF 75%, calcified aortic valve uable to asses degree of stenosis, decreased R heart systolic function  IV lasix 40mg IV BID with good UOP.  Titrate to goal >1.5 litres neg daily  Daily BMP  Follow UOP  Replace K as needed  On BB  Start ARB        Pneumonia  Assessment & Plan  Continue iv unasyn/azithro  Aspiration pna? I have asked speech to see patient.       Abnormal CT of the chest  Assessment & Plan  cta chest reviewed  DW pulm to assess if repeat CT vs Bx is indicated  If Bx endobronchial vs transthroacic        Primary hypertension  Assessment & Plan  -Furosemide 40 mg IV BID (home dose 40 mg PO daily)  -Continue home metoprolol tartrate 100 mg daily  -amlodipine added on admission  -add olmesartan 20mg         VTE prophylaxis: Lovenox    I have performed a physical exam and reviewed and updated ROS and Plan today (10/6/2023). In review of yesterday's note (10/5/2023), there are no changes except as documented above.    I spent greater than 52 minutes for chart review, obtaining history independently, performing medically appropriate examination,  documenting , ordering medications, tests, or procedures, referring and communicating with other health care professionals, Independently interpreting results and communicating results with patient/family/caregiver. More than 50% of time was spent in face-to-face clinical encounter.

## 2023-10-07 NOTE — PROGRESS NOTES
Assumed care of patient at bedside report from NOC RN. Updated on POC. Patient currently A & O x 3, disoriented to time; on 3.5 L O2 NC; up to side of bed with max assist; with  complaints of acute pain to right knee with movement.  Call light within reach. Whiteboard updated. Fall precautions in place. Bed locked and in lowest position. All questions answered. No other needs indicated at this time.   Reports no pain outside of pain to his right knee with movement, asked about getting  around at home and performing ADL's and knee pain history but information given was unreliable

## 2023-10-07 NOTE — PROGRESS NOTES
Provider notified on Bladder scan results for 0200 scan and patient urinary retention since back was D/C'd.   Patient does not report pressure of the feeling of having to pee and due to confusion is unable to give reliable information at this time. Patient was straight cathed earlier in PM shift and 700ml resulted. Second bladder scan shows urine still be retained and no incontinence noted.     Orders for back placement given.

## 2023-10-07 NOTE — ASSESSMENT & PLAN NOTE
19 beats of VTACH. Pt asymptomatic    10/9 V. tach 6 seconds  Optimize electrolytes, potassium over 4 and mag over 2  Continue telemetry monitor  Continue metoprolol

## 2023-10-07 NOTE — CARE PLAN
"The patient is Watcher - Medium risk of patient condition declining or worsening    Shift Goals  Clinical Goals: MRI Brain, US LLE, monitor o2 sats, mentation  Patient Goals: Rest, pain control, \"go home\"  Family Goals: At bedside    Progress made toward(s) clinical / shift goals:    Pt US renal, pt scheduled to have MRI of brain later tonight.     Problem: Fall Risk  Goal: Patient will remain free from falls  Outcome: Progressing     Problem: Knowledge Deficit - Standard  Goal: Patient and family/care givers will demonstrate understanding of plan of care, disease process/condition, diagnostic tests and medications  Outcome: Progressing  Note: Discuss and review POC with patient/family. Re-educate as needed.       Problem: Respiratory  Goal: Patient will achieve/maintain optimum respiratory ventilation and gas exchange  Outcome: Progressing  Note: Pt on 4 L NC maintaing O2 sats in the 90's.        Patient is not progressing towards the following goals:      "

## 2023-10-07 NOTE — CONSULTS
DATE OF SERVICE:  10/07/2023     NEPHROLOGY CONSULTATION     Consult at the request of Dr. Samira Mclain.     REASON FOR CONSULTATION:  To evaluate the patient with acute kidney injury.     HISTORY OF PRESENT ILLNESS:  The patient is a 68-year-old male with multiple   medical problems, hypertension, diabetes mellitus type 2, chronic kidney   disease stage III with baseline creatinine level of 0.8, 0.9, who presented to   the emergency room on 10/04/2023 with complaints of worsening shortness of   breath, nausea, not feeling well, weight gain, abdominal distention.  Hospital   course complicated with acute kidney injury.  The patient received imaging   studies with IV contrast on 10/04.  At that time, creatinine was slightly   worse from 0.8, 0.9-1.1, worsened up after 48 hours to 1.84, now at 2.25.  The   patient's urine output is 700 after placed Sánchez catheter.  Currently, the   patient states doing better.  Improved shortness of breath.  No cough, no   hemoptysis, no nausea or vomiting.  Still with Sánchez catheter.  Urinalysis,   urine electrolytes and renal ultrasound pending.     REVIEW OF SYSTEMS:  GENERAL:  Positive for malaise.  No fever, chills, fatigue.  HEENT:  No sore throat, no sinus pain, no nosebleeds.  No double or blurry   vision, no eye pain.  RESPIRATORY:  Positive for shortness of breath, no hemoptysis, no wheezes.  CARDIOVASCULAR:  Positive orthopnea, leg swelling.  No chest pain.  GASTROINTESTINAL:  Positive for nausea.  No abdominal pain, no nausea, no   diarrhea, no vomiting.  GENITOURINARY:  With Sánchez catheter.  No flank pain.  SKIN:  No rash or itching.     All other systems reviewed, all negative.     PAST MEDICAL HISTORY:  Chronic obstructive pulmonary disease, diabetes   mellitus type 2, hypertension.     PAST SURGICAL HISTORY:  Abdominal surgery, appendectomy.     ALLERGIES:   No known drug allergies.     OUTPATIENT MEDICATIONS:  Reviewed.     FAMILY HISTORY:  No history of kidney disease.      SOCIAL HISTORY:  Recently quit drinking alcohol.  No drugs.  No tobacco.     PHYSICAL EXAMINATION:  VITAL SIGNS:  Blood pressure 130/74, heart rate 83, temperature 36.4 Celsius.  GENERAL APPEARANCE:  Well-developed, obese male, in no acute distress.  HEENT:  Normocephalic, atraumatic.  Pupils equal, round, reactive to light.    Extraocular movement intact.  Nares patent.  Oropharynx clear, moist mucosa,   no erythema or exudate.  NECK:  Supple.  No lymphadenopathy, no thyromegaly appreciated.  LUNGS:  Coarse breath sounds bilaterally.  Decreased breath sounds at bases.    No wheezes.  No rales.  HEART:  Regular rhythm, no rub or gallop.  ABDOMEN:  Soft, distended, no tenderness.  No palpable mass.  No   costovertebral area tenderness to palpation.  EXTREMITIES:  Mild pedal edema bilaterally.  No cyanosis.  NEUROLOGIC:  Alert, oriented x3, no focal deficit.  Cranial nerves II-XII   grossly intact.  SKIN:  Warm, dry.  No erythema or rash.     LABORATORY DATA:  Laboratory results reviewed, revealed hemoglobin level 17.8,   sodium 134, potassium 5.0, CO2 of 31, BUN 54 and creatinine 2.25.     ASSESSMENT AND PLAN:  The patient is a 68-year-old male with multiple medical   problems, hypertension, diabetes mellitus type 2, with hospital course   complicated with acute kidney injury.  1.  Acute kidney injury on chronic kidney disease stage II, possibly due to   contrast-induced nephropathy.  The patient was held Lasix already.  Check BNP    .Continue to monitor closely.  2.  Electrolytes, noticed mild hyponatremia.  To monitor.  3.  Hypertension.  Blood pressure remains well controlled.  4.  Volume.  Holding Lasix.  We will check brain natriuretic peptide.  5.  Anemia.  Hemoglobin level stable, within normal limits.     RECOMMENDATIONS:  1.  To complete urinalysis, urine electrolytes, urine protein creatinine and   microalbumin creatinine ratio, renal ultrasound.  To avoid nephrotoxic agents   like IV contrast.  2.   Contrast.  3.  Low sodium diet.  4.  Daily basic metabolic panel.  Check brain natriuretic peptide.  5.  There is no need for emergent dialysis at present time.  We will follow   the patient closely.     Thank you for the consult.        ______________________________  MD NOLAN SNOW/ENOC/    DD:  10/07/2023 13:56  DT:  10/07/2023 14:36    Job#:  [id number]

## 2023-10-07 NOTE — ASSESSMENT & PLAN NOTE
Patient is drowsy and confused. I discussed advance care planning with the the wife at bedside, including diagnosis, prognosis, plan of care, risks and benefits of any therapies that could be offered.   We discussed regarding CODE STATUS, CPR and intubation with mechanical ventilation, discussed regarding risk and benefits. The wife is willing to  have all life sustaining efforts. Continue full code.  ACP: 16min

## 2023-10-07 NOTE — PROGRESS NOTES
Pt found to be altered. Vitals are stable, pt is alert and able to answer questions. Only able to correctly answer name and birthday. Pt states we are in an igloo and the year is 1955. Pt is unable to appropriately follow commands. MD He notified. Received orders for STAT ABG, CBC, and CMP.

## 2023-10-07 NOTE — PROGRESS NOTES
Monitor Summary:    Rhythm: SR  Rate: 79-87  Ectopy: (R) PVC, (R) PAC, 10 bts PSVT  Measurement : .16/.09/.39

## 2023-10-07 NOTE — PROGRESS NOTES
Lab called with critical lab results  PCO2 53.4  Corrected PCO2 is 51.8    Read back to lab confirmed, bedside RN to notify MD

## 2023-10-07 NOTE — ASSESSMENT & PLAN NOTE
New JOSLYN, Cre 0.8> 1.84,   Likely due to overdiuresis and IV contrast    hold diuretics and ARBs    10/7 Worsening JOSLYN, Cre 0.8> 1.84>2, hold diuretics and ARBs.   I discussed with the nephrology, continue to hold Lasix, ordered urine analysis    10/9 resolved. May resume Lasix, ARBs on hold  Avoid nephrotoxic agent

## 2023-10-07 NOTE — PROGRESS NOTES
Hospital Medicine Daily Progress Note    Date of Service  10/7/2023    Chief Complaint  Blair Celeste is a 68 y.o. male admitted 10/3/2023 with shortness of breath    Hospital Course  68 y.o. male who presented 10/3/2023 with a PMHx of ETOH abuse, tobacco abuse, DM, HTN, BMI 43, COPD.  Presented 10/4 with SOB.  CTPA neg for PE but did show large LLL infiltrate.  Incidentally noted to have a pulmonary mass.  Admitted to ICU on BiPAP, Abx's.  ETOH cirrhosis.  Patient transferred out of ICU 10/5    Distended abdomen -ultrasound negative for ascites    Interval Problem Update  Patient was seen and examined at bedside  AO times x3, knowing the event, not knowing the month or year  Episodes of confusion  Care plan was discussed and updated with the wife at the bedside    Masslike consolidation in the right lower lobe,     7L> Bipap > 54L  ABG  Pco2 80> 53, PH 7.4    Worsening JOSLYN, Cre 0.8> 1.84>2, hold diuretics and ARBs.   I discussed with the nephrology, continue to hold Lasix, ordered urine analysis    R heart failure, BNP 2965  ECHO ef 70%, right ventricle is dilated with reduced right ventricular systolic function.    MRI brain pending    I have discussed this patient's plan of care and discharge plan at IDT rounds today with Case Management, Nursing, Nursing leadership, and other members of the IDT team.    Consultants/Specialty      Code Status  Full Code    Disposition  The patient is not medically cleared for discharge to home or a post-acute facility.      I have placed the appropriate orders for post-discharge needs.    Review of Systems  All 12 systems were reviewed and negative except as mentioned above       Physical Exam  Temp:  [36.2 °C (97.2 °F)-36.6 °C (97.9 °F)] 36.4 °C (97.5 °F)  Pulse:  [81-91] 83  Resp:  [18-20] 18  BP: (121-136)/(68-80) 130/74  SpO2:  [90 %-97 %] 92 %    Physical Exam  Constitutional:       Appearance: He is ill-appearing.   HENT:      Head: Normocephalic.      Mouth/Throat:       Mouth: Mucous membranes are moist.   Eyes:      Extraocular Movements: Extraocular movements intact.      Conjunctiva/sclera: Conjunctivae normal.   Cardiovascular:      Rate and Rhythm: Normal rate and regular rhythm.      Pulses: Normal pulses.   Pulmonary:      Effort: Pulmonary effort is normal.      Comments: Decrease breathing sounds  Abdominal:      General: Bowel sounds are normal. There is distension.      Palpations: Abdomen is soft.      Tenderness: There is no abdominal tenderness.   Musculoskeletal:         General: No swelling or tenderness.      Cervical back: Normal range of motion and neck supple.   Skin:     General: Skin is warm.   Neurological:      Mental Status: He is alert. He is disoriented.      Comments: Drowsy, AO times 2-3   Psychiatric:         Mood and Affect: Mood normal.         Fluids    Intake/Output Summary (Last 24 hours) at 10/7/2023 1553  Last data filed at 10/7/2023 0500  Gross per 24 hour   Intake 800 ml   Output 700 ml   Net 100 ml       Laboratory  Recent Labs     10/06/23  0348 10/06/23  1851 10/07/23  0107   WBC 9.3 10.3 10.1   RBC 5.76 5.85 5.76   HEMOGLOBIN 17.4 18.0 17.8   HEMATOCRIT 56.1* 56.7* 55.1*   MCV 97.4 96.9 95.7   MCH 30.2 30.8 30.9   MCHC 31.0* 31.7* 32.3   RDW 57.4* 56.7* 56.0*   PLATELETCT 302 286 315   MPV 9.5 9.7 9.2     Recent Labs     10/06/23  0348 10/06/23  1851 10/07/23  0107   SODIUM 136 133* 134*   POTASSIUM 5.1 5.2 5.0   CHLORIDE 94* 92* 92*   CO2 31 30 31   GLUCOSE 138* 146* 141*   BUN 45* 52* 54*   CREATININE 1.84* 2.03* 2.25*   CALCIUM 9.2 9.5 9.1                       Imaging  US-ABDOMEN LTD (SOFT TISSUE)   Final Result      No ascites is visualized.      DX-CHEST-PORTABLE (1 VIEW)   Final Result         1.  Linear densities in the right midlung and left lung base suggesting atelectasis or infiltrate.   2.  Cardiomegaly      EC-ECHOCARDIOGRAM COMPLETE W/O CONT   Final Result      CT-CTA NECK WITH & W/O-POST PROCESSING   Final Result       Atherosclerosis of the carotid bifurcations and bulbs without hemodynamically significant stenosis.      Patent vertebral arteries.      CT-CTA HEAD WITH & W/O-POST PROCESS   Final Result      No large vessel occlusion or hemodynamically significant stenosis or aneurysm.      CT-CEREBRAL PERFUSION ANALYSIS   Final Result      1.  Small focus of potentially reversible ischemia in the left parietal lobe.      CT-HEAD W/O   Final Result      No acute intracranial abnormality.                  CT-CTA CHEST PULMONARY ARTERY W/ RECONS   Final Result         1.  No large central pulmonary embolus is appreciated, evaluation of the subsegmental branches is essentially nondiagnostic due to motion artifacts. Additional imaging would be required for definitive exclusion of small distal pulmonary emboli.   2.  Masslike consolidation in the right lower lobe, could represent round atelectasis or infiltrate however appearance is concerning for pulmonary mass. Should be considered neoplastic less frequent otherwise.   3.  Hazy right lower lobe opacities suggests component of superimposed infiltrate.   4.  Trace right pleural effusion.   5.  Mediastinal and right hilar lymph nodes, appearance concerning for possible metastatic disease given pulmonary finding.   6.  Fluid in the left upper quadrant adjacent to the spleen, density most typical of ascites   7.  Irregular hepatic contour compatible with changes of cirrhosis.   8.  Atherosclerosis and atherosclerotic coronary artery disease.   9.  Pulmonary nodule, see nodule follow-up recommendations below.      Fleischner Society pulmonary nodule recommendations:   Low and High Risk: Consider CT at 3 months, PET/CT, or tissue sampling.      Low Risk - Minimal or absent history of smoking and of other known risk factors.      High Risk - History of smoking or of other known risk factors.      Note: These recommendations do not apply to lung cancer screening, patients with  immunosuppression, or patients with known primary cancer.      Fleischner Society 2017 Guidelines for Management of Incidentally Detected Pulmonary Nodules in Adults         MR-BRAIN-W/O    (Results Pending)   US-EXTREMITY VENOUS LOWER BILAT    (Results Pending)   US-RENAL    (Results Pending)        Assessment/Plan  * Acute on chronic respiratory failure with hypoxia and hypercapnia (HCC)- (present on admission)  Assessment & Plan  In setting of untreated RAYMOND  R heart failure +/- pneumonia, possible COPD  Responding to diuresis  Cont O2/RT protocols    JOSLYN (acute kidney injury) (HCC)  Assessment & Plan  New JOSLYN, Cre 0.8> 1.84,   Likely due to overdiuresis and IV contrast    hold diuretics and ARBs    10/7 Worsening JOSLYN, Cre 0.8> 1.84>2, hold diuretics and ARBs.   I discussed with the nephrology, continue to hold Lasix, ordered urine analysis    Monitor  Renal dose medications  Avoid nephrotoxic agent    Nonsustained ventricular tachycardia (HCC)  Assessment & Plan  19 beats of VTACH. Pt asymptomatic  Optimize electrolytes, potassium over 4 and mag over 2  Continue telemetry monitor  Continue metoprolol    Obstructive sleep apnea  Assessment & Plan  CPAP while in house  See if we can arrange for home    Type 2 diabetes mellitus (HCC)  Assessment & Plan  A1c 8.5   On dapagliflozin as outpt  Covering with SSI while in house  Add glargine if needed    Cirrhosis (HCC)  Assessment & Plan  Likely ETOH related  Component of fatty liver certainly possible  Check hep panel  Will need outpt follow up  Reports he's quit drinking; counseled him today on need for sobriety    Alcohol abuse  Assessment & Plan  Reports his last drink was 2 wks ago  Watch for signs of withdrawal    Acute encephalopathy  Assessment & Plan  Acute metabolic encephalopathy since resolved    Acute on chronic right-sided heart failure (HCC)  Assessment & Plan  TTE showing LVEF 75%, calcified aortic valve uable to asses degree of stenosis, decreased R  heart systolic function  IV lasix 40mg IV BID with good UOP.  Titrate to goal >1.5 litres neg daily  Daily BMP  Follow UOP  Replace K as needed  On BB  Start ARB        Pneumonia  Assessment & Plan  Continue iv unasyn/azithro  Aspiration pna? I have asked speech to see patient.       Abnormal CT of the chest  Assessment & Plan  cta chest reviewed  DW pulm to assess if repeat CT vs Bx is indicated  If Bx endobronchial vs transthroacic        Primary hypertension  Assessment & Plan  -Furosemide 40 mg IV BID (home dose 40 mg PO daily)  -Continue home metoprolol tartrate 100 mg daily  -amlodipine added on admission  -add olmesartan 20mg    ACP (advance care planning)  Assessment & Plan  Patient is drowsy and confused. I discussed advance care planning with the the wife at bedside, including diagnosis, prognosis, plan of care, risks and benefits of any therapies that could be offered.   We discussed regarding CODE STATUS, CPR and intubation with mechanical ventilation, discussed regarding risk and benefits. The wife is willing to  have all life sustaining efforts. Continue full code.  ACP: 16min         VTE prophylaxis: Lovenox    I have performed a physical exam and reviewed and updated ROS and Plan today (10/7/2023). In review of yesterday's note (10/6/2023), there are no changes except as documented above.

## 2023-10-08 ENCOUNTER — APPOINTMENT (OUTPATIENT)
Dept: RADIOLOGY | Facility: MEDICAL CENTER | Age: 68
DRG: 291 | End: 2023-10-08
Attending: STUDENT IN AN ORGANIZED HEALTH CARE EDUCATION/TRAINING PROGRAM
Payer: MEDICARE

## 2023-10-08 PROBLEM — M25.561 PAIN IN BOTH KNEES: Status: ACTIVE | Noted: 2023-10-08

## 2023-10-08 PROBLEM — M25.562 PAIN IN BOTH KNEES: Status: ACTIVE | Noted: 2023-10-08

## 2023-10-08 PROBLEM — G93.41 ACUTE METABOLIC ENCEPHALOPATHY: Status: ACTIVE | Noted: 2023-10-04

## 2023-10-08 PROBLEM — N30.00 ACUTE CYSTITIS: Status: ACTIVE | Noted: 2023-10-08

## 2023-10-08 PROBLEM — M79.89 LEG SWELLING: Status: ACTIVE | Noted: 2023-10-08

## 2023-10-08 LAB
ALBUMIN SERPL BCP-MCNC: 2.9 G/DL (ref 3.2–4.9)
ALBUMIN/GLOB SERPL: 0.7 G/DL
ALP SERPL-CCNC: 53 U/L (ref 30–99)
ALT SERPL-CCNC: 32 U/L (ref 2–50)
ANION GAP SERPL CALC-SCNC: 12 MMOL/L (ref 7–16)
AST SERPL-CCNC: 41 U/L (ref 12–45)
BACTERIA BLD CULT: NORMAL
BASOPHILS # BLD AUTO: 0.4 % (ref 0–1.8)
BASOPHILS # BLD: 0.03 K/UL (ref 0–0.12)
BILIRUB SERPL-MCNC: 0.7 MG/DL (ref 0.1–1.5)
BUN SERPL-MCNC: 54 MG/DL (ref 8–22)
CALCIUM ALBUM COR SERPL-MCNC: 10.1 MG/DL (ref 8.5–10.5)
CALCIUM SERPL-MCNC: 9.2 MG/DL (ref 8.5–10.5)
CHLORIDE SERPL-SCNC: 93 MMOL/L (ref 96–112)
CO2 SERPL-SCNC: 31 MMOL/L (ref 20–33)
CREAT SERPL-MCNC: 1.52 MG/DL (ref 0.5–1.4)
EOSINOPHIL # BLD AUTO: 0.14 K/UL (ref 0–0.51)
EOSINOPHIL NFR BLD: 1.7 % (ref 0–6.9)
ERYTHROCYTE [DISTWIDTH] IN BLOOD BY AUTOMATED COUNT: 56.7 FL (ref 35.9–50)
GFR SERPLBLD CREATININE-BSD FMLA CKD-EPI: 50 ML/MIN/1.73 M 2
GLOBULIN SER CALC-MCNC: 3.9 G/DL (ref 1.9–3.5)
GLUCOSE BLD STRIP.AUTO-MCNC: 149 MG/DL (ref 65–99)
GLUCOSE BLD STRIP.AUTO-MCNC: 162 MG/DL (ref 65–99)
GLUCOSE BLD STRIP.AUTO-MCNC: 173 MG/DL (ref 65–99)
GLUCOSE BLD STRIP.AUTO-MCNC: 176 MG/DL (ref 65–99)
GLUCOSE SERPL-MCNC: 129 MG/DL (ref 65–99)
HCT VFR BLD AUTO: 55.5 % (ref 42–52)
HGB BLD-MCNC: 17.5 G/DL (ref 14–18)
IMM GRANULOCYTES # BLD AUTO: 0.04 K/UL (ref 0–0.11)
IMM GRANULOCYTES NFR BLD AUTO: 0.5 % (ref 0–0.9)
LYMPHOCYTES # BLD AUTO: 1.05 K/UL (ref 1–4.8)
LYMPHOCYTES NFR BLD: 13 % (ref 22–41)
MAGNESIUM SERPL-MCNC: 2.1 MG/DL (ref 1.5–2.5)
MCH RBC QN AUTO: 29.9 PG (ref 27–33)
MCHC RBC AUTO-ENTMCNC: 31.5 G/DL (ref 32.3–36.5)
MCV RBC AUTO: 94.9 FL (ref 81.4–97.8)
MONOCYTES # BLD AUTO: 1.4 K/UL (ref 0–0.85)
MONOCYTES NFR BLD AUTO: 17.3 % (ref 0–13.4)
NEUTROPHILS # BLD AUTO: 5.44 K/UL (ref 1.82–7.42)
NEUTROPHILS NFR BLD: 67.1 % (ref 44–72)
NRBC # BLD AUTO: 0 K/UL
NRBC BLD-RTO: 0 /100 WBC (ref 0–0.2)
NT-PROBNP SERPL IA-MCNC: 955 PG/ML (ref 0–125)
PHOSPHATE SERPL-MCNC: 4.9 MG/DL (ref 2.5–4.5)
PLATELET # BLD AUTO: 327 K/UL (ref 164–446)
PMV BLD AUTO: 9.5 FL (ref 9–12.9)
POTASSIUM SERPL-SCNC: 5 MMOL/L (ref 3.6–5.5)
PROT SERPL-MCNC: 6.8 G/DL (ref 6–8.2)
RBC # BLD AUTO: 5.85 M/UL (ref 4.7–6.1)
SIGNIFICANT IND 70042: NORMAL
SITE SITE: NORMAL
SODIUM SERPL-SCNC: 136 MMOL/L (ref 135–145)
SOURCE SOURCE: NORMAL
WBC # BLD AUTO: 8.1 K/UL (ref 4.8–10.8)

## 2023-10-08 PROCEDURE — 99233 SBSQ HOSP IP/OBS HIGH 50: CPT | Performed by: STUDENT IN AN ORGANIZED HEALTH CARE EDUCATION/TRAINING PROGRAM

## 2023-10-08 PROCEDURE — A9270 NON-COVERED ITEM OR SERVICE: HCPCS | Performed by: INTERNAL MEDICINE

## 2023-10-08 PROCEDURE — A9270 NON-COVERED ITEM OR SERVICE: HCPCS | Performed by: STUDENT IN AN ORGANIZED HEALTH CARE EDUCATION/TRAINING PROGRAM

## 2023-10-08 PROCEDURE — 93970 EXTREMITY STUDY: CPT | Mod: 26 | Performed by: INTERNAL MEDICINE

## 2023-10-08 PROCEDURE — 700102 HCHG RX REV CODE 250 W/ 637 OVERRIDE(OP): Performed by: INTERNAL MEDICINE

## 2023-10-08 PROCEDURE — 94660 CPAP INITIATION&MGMT: CPT

## 2023-10-08 PROCEDURE — 93970 EXTREMITY STUDY: CPT

## 2023-10-08 PROCEDURE — 83880 ASSAY OF NATRIURETIC PEPTIDE: CPT

## 2023-10-08 PROCEDURE — 770020 HCHG ROOM/CARE - TELE (206)

## 2023-10-08 PROCEDURE — 700102 HCHG RX REV CODE 250 W/ 637 OVERRIDE(OP)

## 2023-10-08 PROCEDURE — 80053 COMPREHEN METABOLIC PANEL: CPT

## 2023-10-08 PROCEDURE — 700111 HCHG RX REV CODE 636 W/ 250 OVERRIDE (IP): Mod: JZ | Performed by: HOSPITALIST

## 2023-10-08 PROCEDURE — 700102 HCHG RX REV CODE 250 W/ 637 OVERRIDE(OP): Performed by: HOSPITALIST

## 2023-10-08 PROCEDURE — 73560 X-RAY EXAM OF KNEE 1 OR 2: CPT | Mod: LT

## 2023-10-08 PROCEDURE — 99232 SBSQ HOSP IP/OBS MODERATE 35: CPT | Performed by: INTERNAL MEDICINE

## 2023-10-08 PROCEDURE — 700102 HCHG RX REV CODE 250 W/ 637 OVERRIDE(OP): Performed by: STUDENT IN AN ORGANIZED HEALTH CARE EDUCATION/TRAINING PROGRAM

## 2023-10-08 PROCEDURE — 73560 X-RAY EXAM OF KNEE 1 OR 2: CPT | Mod: RT

## 2023-10-08 PROCEDURE — 83735 ASSAY OF MAGNESIUM: CPT

## 2023-10-08 PROCEDURE — 84100 ASSAY OF PHOSPHORUS: CPT

## 2023-10-08 PROCEDURE — A9270 NON-COVERED ITEM OR SERVICE: HCPCS

## 2023-10-08 PROCEDURE — 36415 COLL VENOUS BLD VENIPUNCTURE: CPT

## 2023-10-08 PROCEDURE — 82962 GLUCOSE BLOOD TEST: CPT | Mod: 91

## 2023-10-08 PROCEDURE — 85025 COMPLETE CBC W/AUTO DIFF WBC: CPT

## 2023-10-08 PROCEDURE — A9270 NON-COVERED ITEM OR SERVICE: HCPCS | Performed by: HOSPITALIST

## 2023-10-08 RX ORDER — CEPHALEXIN 500 MG/1
500 CAPSULE ORAL EVERY 6 HOURS
Status: DISCONTINUED | OUTPATIENT
Start: 2023-10-08 | End: 2023-10-10

## 2023-10-08 RX ADMIN — Medication 100 MG: at 05:32

## 2023-10-08 RX ADMIN — OXYCODONE HYDROCHLORIDE 10 MG: 5 TABLET ORAL at 20:33

## 2023-10-08 RX ADMIN — INSULIN HUMAN 2 UNITS: 100 INJECTION, SOLUTION PARENTERAL at 19:50

## 2023-10-08 RX ADMIN — DOCUSATE SODIUM 50 MG AND SENNOSIDES 8.6 MG 2 TABLET: 8.6; 5 TABLET, FILM COATED ORAL at 04:13

## 2023-10-08 RX ADMIN — CEPHALEXIN 500 MG: 500 CAPSULE ORAL at 13:36

## 2023-10-08 RX ADMIN — AMLODIPINE BESYLATE 10 MG: 10 TABLET ORAL at 04:13

## 2023-10-08 RX ADMIN — INSULIN HUMAN 2 UNITS: 100 INJECTION, SOLUTION PARENTERAL at 13:42

## 2023-10-08 RX ADMIN — ENOXAPARIN SODIUM 40 MG: 100 INJECTION SUBCUTANEOUS at 17:28

## 2023-10-08 RX ADMIN — INSULIN HUMAN 2 UNITS: 100 INJECTION, SOLUTION PARENTERAL at 17:25

## 2023-10-08 RX ADMIN — ENOXAPARIN SODIUM 40 MG: 100 INJECTION SUBCUTANEOUS at 04:13

## 2023-10-08 RX ADMIN — METOPROLOL SUCCINATE 100 MG: 100 TABLET, EXTENDED RELEASE ORAL at 04:13

## 2023-10-08 RX ADMIN — ACETAMINOPHEN 650 MG: 325 TABLET, FILM COATED ORAL at 04:13

## 2023-10-08 RX ADMIN — CEPHALEXIN 500 MG: 500 CAPSULE ORAL at 17:28

## 2023-10-08 RX ADMIN — DOCUSATE SODIUM 50 MG AND SENNOSIDES 8.6 MG 2 TABLET: 8.6; 5 TABLET, FILM COATED ORAL at 17:28

## 2023-10-08 ASSESSMENT — ENCOUNTER SYMPTOMS
FEVER: 0
ABDOMINAL PAIN: 0
SHORTNESS OF BREATH: 0

## 2023-10-08 ASSESSMENT — PAIN DESCRIPTION - PAIN TYPE
TYPE: ACUTE PAIN

## 2023-10-08 NOTE — PROGRESS NOTES
Very difficult to keep O2 on patient. Cpap taken placed back on numerous times and issues persist when pt placed on NC due to being unable to keep Cpap on.   Many times patient is taking odd devices while asleep and is not aware he is doing so.   Needs to be monitored closely while sleeping to ensure O2 sats stay at an acceptable level.

## 2023-10-08 NOTE — CARE PLAN
The patient is Watcher - Medium risk of patient condition declining or worsening    Shift Goals  Clinical Goals: increase mobility, monitor O2 status, pain manage  Patient Goals: rest and comfort  Family Goals: At bedside    Progress made toward(s) clinical / shift goals:  Monitor Safety.  Problem: Fall Risk  Goal: Patient will remain free from falls  Description: Target End Date:  Prior to discharge or change in level of care    Document interventions on the Whitedenise Haddad Fall Risk Assessment    1.  Assess for fall risk factors  2.  Implement fall precautions  Outcome: Progressing     Problem: Knowledge Deficit - Standard  Goal: Patient and family/care givers will demonstrate understanding of plan of care, disease process/condition, diagnostic tests and medications  Description: Target End Date:  1-3 days or as soon as patient condition allows    Document in Patient Education    1.  Patient and family/caregiver oriented to unit, equipment, visitation policy and means for communicating concern  2.  Complete/review Learning Assessment  3.  Assess knowledge level of disease process/condition, treatment plan, diagnostic tests and medications  4.  Explain disease process/condition, treatment plan, diagnostic tests and medications  Outcome: Progressing       Patient is not progressing towards the following goals:

## 2023-10-08 NOTE — PROGRESS NOTES
Assumed care of patient at bedside report from NOC RN. Updated on POC. Patient currently A & O x x4; on 2 L O2 NC; up with max assist; with complaints of acute pain to left leg. Call light within reach. Whiteboard updated. Fall precautions in place. Bed locked and in lowest position. All questions answered. No other needs indicated at this time.   Patient in bed alert and oriented. Repositioned in bed and water offered. Patient states he would like to sleep as much as possible tonight as his tired and did not sleep well the night prior.

## 2023-10-08 NOTE — CARE PLAN
The patient is Watcher - Medium risk of patient condition declining or worsening    Shift Goals  Clinical Goals: Monitor O2, keep C-pap on  Patient Goals: Sleep  Family Goals: At bedside    Progress made toward(s) clinical / shift goals:  Patient will utilize pain meds to keep pain to left leg at a manageable level.     Patient is not progressing towards the following goals:

## 2023-10-08 NOTE — PROGRESS NOTES
Nephrology Daily Progress Note    Date of Service  10/8/2023    Chief Complaint  68 y.o. male with history of alcohol abuse, tobacco abuse, diabetes, hypertension, morbid obesity, COPD admitted 10/3/2023 with shortness of breath, with hospitalization complicated by pneumonia and JOSLYN.    Interval Problem Update  10/8 -urine output 2.2 L in the last 24 hours.  Patient complains of leg pain.  Denies chest pain, shortness of breath.    Review of Systems  Review of Systems   Constitutional:  Negative for fever.   Respiratory:  Negative for shortness of breath.    Cardiovascular:  Negative for chest pain.   Gastrointestinal:  Negative for abdominal pain.   All other systems reviewed and are negative.       Physical Exam  Temp:  [36 °C (96.8 °F)-36.6 °C (97.9 °F)] 36.3 °C (97.3 °F)  Pulse:  [72-87] 72  Resp:  [18] 18  BP: ()/(59-71) 115/71  SpO2:  [89 %-96 %] 93 %    Physical Exam  Constitutional:       General: He is not in acute distress.     Appearance: He is well-developed. He is obese. He is not diaphoretic.      Interventions: Nasal cannula in place.   HENT:      Head: Normocephalic and atraumatic.      Mouth/Throat:      Mouth: Mucous membranes are moist.      Pharynx: Oropharynx is clear. No oropharyngeal exudate.   Eyes:      General: No scleral icterus.     Extraocular Movements: Extraocular movements intact.   Neck:      Trachea: No tracheal deviation.   Cardiovascular:      Rate and Rhythm: Normal rate.      Heart sounds: Normal heart sounds. No murmur heard.  Pulmonary:      Effort: Pulmonary effort is normal.      Breath sounds: Normal breath sounds. No stridor. No rales.   Abdominal:      General: There is no distension.      Palpations: Abdomen is soft.      Tenderness: There is no abdominal tenderness.   Musculoskeletal:         General: Normal range of motion.      Right lower leg: Edema (2+) present.      Left lower leg: Edema (2+) present.   Skin:     General: Skin is warm and dry.   Neurological:       General: No focal deficit present.      Mental Status: He is alert and oriented to person, place, and time.   Psychiatric:         Mood and Affect: Mood normal.         Behavior: Behavior normal.         Fluids    Intake/Output Summary (Last 24 hours) at 10/8/2023 1419  Last data filed at 10/8/2023 0602  Gross per 24 hour   Intake --   Output 2150 ml   Net -2150 ml       Laboratory  Labs reviewed, pertinent labs below.  Recent Labs     10/06/23  1851 10/07/23  0107 10/08/23  0723   WBC 10.3 10.1 8.1   RBC 5.85 5.76 5.85   HEMOGLOBIN 18.0 17.8 17.5   HEMATOCRIT 56.7* 55.1* 55.5*   MCV 96.9 95.7 94.9   MCH 30.8 30.9 29.9   MCHC 31.7* 32.3 31.5*   RDW 56.7* 56.0* 56.7*   PLATELETCT 286 315 327   MPV 9.7 9.2 9.5     Recent Labs     10/06/23  1851 10/07/23  0107 10/08/23  0723   SODIUM 133* 134* 136   POTASSIUM 5.2 5.0 5.0   CHLORIDE 92* 92* 93*   CO2 30 31 31   GLUCOSE 146* 141* 129*   BUN 52* 54* 54*   CREATININE 2.03* 2.25* 1.52*   CALCIUM 9.5 9.1 9.2               URINALYSIS:  Lab Results   Component Value Date/Time    COLORURINE Yellow 10/07/2023 1419    CLARITY Cloudy (A) 10/07/2023 1419    SPECGRAVITY 1.014 10/07/2023 1419    PHURINE 5.5 10/07/2023 1419    KETONES Negative 10/07/2023 1419    PROTEINURIN 100 (A) 10/07/2023 1419    BILIRUBINUR Negative 10/07/2023 1419    UROBILU 1.0 10/07/2023 1419    NITRITE Negative 10/07/2023 1419    LEUKESTERAS Trace (A) 10/07/2023 1419    OCCULTBLOOD Large (A) 10/07/2023 1419     UPC  Lab Results   Component Value Date/Time    TOTPROTUR 65.0 (H) 10/07/2023 1419      Lab Results   Component Value Date/Time    CREATININEU 96.65 10/07/2023 1419         Imaging interpreted by radiologist. Imaging reports reviewed with pertinent findings below  DX-KNEE 2- RIGHT   Final Result         1. No acute osseous abnormality.      DX-KNEE 2- LEFT   Final Result      1.  No evidence of acute fracture or dislocation.      2.  Mild tricompartment degenerative change.      3.  Joint  effusion.      US-EXTREMITY VENOUS LOWER BILAT   Final Result      MR-BRAIN-W/O   Final Result      1.  Markedly motion degraded exam.   2.  No gross acute intracranial abnormality.   3.  Mild white matter disease representing microvascular ischemic changes.      US-RENAL   Final Result      1.  Left renal cysts.      2.  No hydronephrosis. No renal calculus.      US-ABDOMEN LTD (SOFT TISSUE)   Final Result      No ascites is visualized.      DX-CHEST-PORTABLE (1 VIEW)   Final Result         1.  Linear densities in the right midlung and left lung base suggesting atelectasis or infiltrate.   2.  Cardiomegaly      EC-ECHOCARDIOGRAM COMPLETE W/O CONT   Final Result      CT-CTA NECK WITH & W/O-POST PROCESSING   Final Result      Atherosclerosis of the carotid bifurcations and bulbs without hemodynamically significant stenosis.      Patent vertebral arteries.      CT-CTA HEAD WITH & W/O-POST PROCESS   Final Result      No large vessel occlusion or hemodynamically significant stenosis or aneurysm.      CT-CEREBRAL PERFUSION ANALYSIS   Final Result      1.  Small focus of potentially reversible ischemia in the left parietal lobe.      CT-HEAD W/O   Final Result      No acute intracranial abnormality.                  CT-CTA CHEST PULMONARY ARTERY W/ RECONS   Final Result         1.  No large central pulmonary embolus is appreciated, evaluation of the subsegmental branches is essentially nondiagnostic due to motion artifacts. Additional imaging would be required for definitive exclusion of small distal pulmonary emboli.   2.  Masslike consolidation in the right lower lobe, could represent round atelectasis or infiltrate however appearance is concerning for pulmonary mass. Should be considered neoplastic less frequent otherwise.   3.  Hazy right lower lobe opacities suggests component of superimposed infiltrate.   4.  Trace right pleural effusion.   5.  Mediastinal and right hilar lymph nodes, appearance concerning for  possible metastatic disease given pulmonary finding.   6.  Fluid in the left upper quadrant adjacent to the spleen, density most typical of ascites   7.  Irregular hepatic contour compatible with changes of cirrhosis.   8.  Atherosclerosis and atherosclerotic coronary artery disease.   9.  Pulmonary nodule, see nodule follow-up recommendations below.      Fleischner Society pulmonary nodule recommendations:   Low and High Risk: Consider CT at 3 months, PET/CT, or tissue sampling.      Low Risk - Minimal or absent history of smoking and of other known risk factors.      High Risk - History of smoking or of other known risk factors.      Note: These recommendations do not apply to lung cancer screening, patients with immunosuppression, or patients with known primary cancer.      Fleischner Society 2017 Guidelines for Management of Incidentally Detected Pulmonary Nodules in Adults               Current Facility-Administered Medications   Medication Dose Route Frequency Provider Last Rate Last Admin    cephALEXin (Keflex) capsule 500 mg  500 mg Oral Q6HRS Samira Mclain M.D.   500 mg at 10/08/23 1336    amLODIPine (Norvasc) tablet 10 mg  10 mg Oral Q DAY Dallin Foster M.D.   10 mg at 10/08/23 0413    [Held by provider] olmesartan (Benicar) tablet 20 mg  20 mg Oral Q DAY Raul Donahue D.O.   20 mg at 10/06/23 0526    enoxaparin (Lovenox) inj 40 mg  40 mg Subcutaneous Q12HRS Raul Donahue D.O.   40 mg at 10/08/23 0413    oxyCODONE immediate-release (Roxicodone) tablet 5-10 mg  5-10 mg Oral Q4HRS PRN Raul Donahue D.O.   5 mg at 10/07/23 2057    senna-docusate (Pericolace Or Senokot S) 8.6-50 MG per tablet 2 Tablet  2 Tablet Oral BID Radha Brooks M.D.   2 Tablet at 10/08/23 0413    And    polyethylene glycol/lytes (Miralax) PACKET 1 Packet  1 Packet Oral QDAY PRMIKE Brooks M.D.        And    magnesium hydroxide (Milk Of Magnesia) suspension 30 mL  30 mL Oral QDAY PRN Radha  YENI Brooks        And    bisacodyl (Dulcolax) suppository 10 mg  10 mg Rectal QDAY PRN Radha Brooks M.D.        acetaminophen (Tylenol) tablet 650 mg  650 mg Oral Q6HRS PRN Radha Brooks M.D.   650 mg at 10/08/23 0413    [Held by provider] furosemide (Lasix) injection 40 mg  40 mg Intravenous BID DIURETIC Radha Brooks M.D.   40 mg at 10/06/23 0525    Respiratory Therapy Consult   Nebulization Continuous RT Radha Brooks M.D.        metoprolol SR (Toprol XL) tablet 100 mg  100 mg Oral Q DAY Maida Diallo M.D.   100 mg at 10/08/23 0413    thiamine (Vitamin B-1) tablet 100 mg  100 mg Oral DAILY Dallin Foster M.D.   100 mg at 10/08/23 0532    insulin regular (HumuLIN R,NovoLIN R) injection  2-9 Units Subcutaneous 4X/DAY ACHS Dallin Foster M.D.   2 Units at 10/08/23 1342    And    dextrose 50% (D50W) injection 25 g  25 g Intravenous Q15 MIN PRN Dallin Foster M.D.             Assessment/Plan  68 y.o. male with history of alcohol abuse, tobacco abuse, diabetes, hypertension, morbid obesity, COPD admitted 10/3/2023 with shortness of breath, with hospitalization complicated by pneumonia and JOSLYN.    1.  JOSLYN, nonoliguric, improving.  JOSLYN likely from contrast nephropathy.  Avoid NSAIDs and other nephrotoxins.  Check renal function panel daily.    2.  Hyponatremia, improving.  Limit hypotonic fluids.  Do not order salt tabs.  Check sodium level once daily.    3.  Microalbuminuria, suspect this is chronic and related to diabetic nephropathy.  Recommend repeat urinalysis, urine protein quantification in 3 days or as an outpatient.  When JOSLYN resolves, recommend resume home Farxiga, and start patient on an ACE inhibitor or ARB if he can tolerate it.    4.  Hypertension, controlled.  If microalbuminuria persists on repeat testing, I would recommend starting patient on an ACE inhibitor or ARB if he can tolerate it.    As the patient is nonoliguric and kidney function is improving, nephrology will  sign off.  Discussed with Dr. Samira Link MD  Nephrology  [facility]

## 2023-10-08 NOTE — ASSESSMENT & PLAN NOTE
Likely gout flare  Fluid showed around 10,000 white blood cell, probably, rare monosodium urate  Colchicine was started  Less likely to be infection

## 2023-10-08 NOTE — PROGRESS NOTES
Bedside report received. Pt A&Ox3. Pt on 5L o2 sating @ 93%. POC discussed with pt. Pt verbalizes understanding. Call light and belongings within reach. Bed locked and in lowest position. Alarm and fall precautions in place.

## 2023-10-08 NOTE — PROGRESS NOTES
Hospital Medicine Daily Progress Note    Date of Service  10/8/2023    Chief Complaint  Blair Celeste is a 68 y.o. male admitted 10/3/2023 with shortness of breath    Hospital Course  68 y.o. male who presented 10/3/2023 with a PMHx of ETOH abuse, tobacco abuse, DM, HTN, BMI 43, COPD.  Presented 10/4 with SOB.  CTPA neg for PE but did show large LLL infiltrate.  Incidentally noted to have a pulmonary mass.  Admitted to ICU on BiPAP, Abx's.  ETOH cirrhosis.    Patient transferred out of ICU 10/5    Distended abdomen -ultrasound negative for ascites    Interval Problem Update  Patient was seen and examined at bedside  AO times x3  Episodes of confusion  Care plan was discussed and updated with the wife at the bedside    Reported bilateral knee pain -I ordered x-ray    Reported the dysuria -UA positive for WBC, urine culture pending, started on Keflex    Respiratory status continued to improve, Bipap > 5L> 3L     nonsustained V. Tach-continue telemetry monitor , optimize electrolytes potassium over 4 and mag over 2, continue metoprolol    JOSLYN improving -continue to hold diuretics and ARBs  Renal ultrasound unremarkable  I discussed with the nephrology    Right heart failure -Lasix on hold due to JOSLYN  Echo showed normal EF of 70%. right ventricle is dilated with reduced right ventricular systolic function.    AMS -MRI negative for acute pathology, it did show mild microvascular ischemic changes    Masslike consolidation in the right lower lobe - repeat CT in a month    I have discussed this patient's plan of care and discharge plan at IDT rounds today with Case Management, Nursing, Nursing leadership, and other members of the IDT team.    Consultants/Specialty      Code Status  Full Code    Disposition  Medically Cleared  I have placed the appropriate orders for post-discharge needs.    Review of Systems  All 12 systems were reviewed and negative except as mentioned above       Physical Exam  Temp:  [36 °C (96.8  °F)-36.6 °C (97.9 °F)] 36.3 °C (97.3 °F)  Pulse:  [72-87] 72  Resp:  [18] 18  BP: ()/(59-71) 115/71  SpO2:  [89 %-96 %] 93 %    Physical Exam  Constitutional:       Appearance: He is ill-appearing.   HENT:      Head: Normocephalic.      Mouth/Throat:      Mouth: Mucous membranes are moist.   Eyes:      Extraocular Movements: Extraocular movements intact.      Conjunctiva/sclera: Conjunctivae normal.   Cardiovascular:      Rate and Rhythm: Normal rate and regular rhythm.      Pulses: Normal pulses.   Pulmonary:      Effort: Pulmonary effort is normal.      Comments: Decrease breathing sounds  Abdominal:      General: Bowel sounds are normal. There is distension.      Palpations: Abdomen is soft.      Tenderness: There is no abdominal tenderness.   Musculoskeletal:         General: No swelling or tenderness.      Cervical back: Normal range of motion and neck supple.   Skin:     General: Skin is warm.   Neurological:      Mental Status: He is alert. He is disoriented.      Comments: Drowsy, AO times 2-3   Psychiatric:         Mood and Affect: Mood normal.         Fluids    Intake/Output Summary (Last 24 hours) at 10/8/2023 1232  Last data filed at 10/8/2023 0602  Gross per 24 hour   Intake --   Output 2150 ml   Net -2150 ml       Laboratory  Recent Labs     10/06/23  1851 10/07/23  0107 10/08/23  0723   WBC 10.3 10.1 8.1   RBC 5.85 5.76 5.85   HEMOGLOBIN 18.0 17.8 17.5   HEMATOCRIT 56.7* 55.1* 55.5*   MCV 96.9 95.7 94.9   MCH 30.8 30.9 29.9   MCHC 31.7* 32.3 31.5*   RDW 56.7* 56.0* 56.7*   PLATELETCT 286 315 327   MPV 9.7 9.2 9.5     Recent Labs     10/06/23  1851 10/07/23  0107 10/08/23  0723   SODIUM 133* 134* 136   POTASSIUM 5.2 5.0 5.0   CHLORIDE 92* 92* 93*   CO2 30 31 31   GLUCOSE 146* 141* 129*   BUN 52* 54* 54*   CREATININE 2.03* 2.25* 1.52*   CALCIUM 9.5 9.1 9.2                       Imaging  US-EXTREMITY VENOUS LOWER BILAT   Final Result      MR-BRAIN-W/O   Final Result      1.  Markedly motion  degraded exam.   2.  No gross acute intracranial abnormality.   3.  Mild white matter disease representing microvascular ischemic changes.      US-RENAL   Final Result      1.  Left renal cysts.      2.  No hydronephrosis. No renal calculus.      US-ABDOMEN LTD (SOFT TISSUE)   Final Result      No ascites is visualized.      DX-CHEST-PORTABLE (1 VIEW)   Final Result         1.  Linear densities in the right midlung and left lung base suggesting atelectasis or infiltrate.   2.  Cardiomegaly      EC-ECHOCARDIOGRAM COMPLETE W/O CONT   Final Result      CT-CTA NECK WITH & W/O-POST PROCESSING   Final Result      Atherosclerosis of the carotid bifurcations and bulbs without hemodynamically significant stenosis.      Patent vertebral arteries.      CT-CTA HEAD WITH & W/O-POST PROCESS   Final Result      No large vessel occlusion or hemodynamically significant stenosis or aneurysm.      CT-CEREBRAL PERFUSION ANALYSIS   Final Result      1.  Small focus of potentially reversible ischemia in the left parietal lobe.      CT-HEAD W/O   Final Result      No acute intracranial abnormality.                  CT-CTA CHEST PULMONARY ARTERY W/ RECONS   Final Result         1.  No large central pulmonary embolus is appreciated, evaluation of the subsegmental branches is essentially nondiagnostic due to motion artifacts. Additional imaging would be required for definitive exclusion of small distal pulmonary emboli.   2.  Masslike consolidation in the right lower lobe, could represent round atelectasis or infiltrate however appearance is concerning for pulmonary mass. Should be considered neoplastic less frequent otherwise.   3.  Hazy right lower lobe opacities suggests component of superimposed infiltrate.   4.  Trace right pleural effusion.   5.  Mediastinal and right hilar lymph nodes, appearance concerning for possible metastatic disease given pulmonary finding.   6.  Fluid in the left upper quadrant adjacent to the spleen, density  most typical of ascites   7.  Irregular hepatic contour compatible with changes of cirrhosis.   8.  Atherosclerosis and atherosclerotic coronary artery disease.   9.  Pulmonary nodule, see nodule follow-up recommendations below.      Fleischner Society pulmonary nodule recommendations:   Low and High Risk: Consider CT at 3 months, PET/CT, or tissue sampling.      Low Risk - Minimal or absent history of smoking and of other known risk factors.      High Risk - History of smoking or of other known risk factors.      Note: These recommendations do not apply to lung cancer screening, patients with immunosuppression, or patients with known primary cancer.      Fleischner Society 2017 Guidelines for Management of Incidentally Detected Pulmonary Nodules in Adults              Assessment/Plan  * Acute on chronic respiratory failure with hypoxia and hypercapnia (HCC)- (present on admission)  Assessment & Plan  In setting of untreated RAYMOND  R heart failure +/- pneumonia, possible COPD  Responding to diuresis  Cont O2/RT protocols    JOSLYN (acute kidney injury) (HCC)  Assessment & Plan  New JOSLYN, Cre 0.8> 1.84,   Likely due to overdiuresis and IV contrast    hold diuretics and ARBs    10/7 Worsening JOSLYN, Cre 0.8> 1.84>2, hold diuretics and ARBs.   I discussed with the nephrology, continue to hold Lasix, ordered urine analysis    Monitor  Renal dose medications  Avoid nephrotoxic agent    Leg swelling  Assessment & Plan  Ultrasound negative for DVT  Due to fluid overload  Lasix    Improving    Pain in both knees  Assessment & Plan  Ordered x-ray  Pain management    Acute cystitis  Assessment & Plan  Reported the dysuria -UA positive for WBC, urine culture pending,  Patient received antibiotics recently, which may mask the UA result   started on Keflex given his symptoms  Urine culture pending      Nonsustained ventricular tachycardia (HCC)  Assessment & Plan  19 beats of VTACH. Pt asymptomatic    10/9 V. tach 6 seconds  Optimize  electrolytes, potassium over 4 and mag over 2  Continue telemetry monitor  Continue metoprolol    Obstructive sleep apnea  Assessment & Plan  CPAP while in house  See if we can arrange for home    Type 2 diabetes mellitus (HCC)  Assessment & Plan  A1c 8.5   On dapagliflozin as outpt  Covering with SSI while in house  Add glargine if needed    Cirrhosis (HCC)  Assessment & Plan  Likely ETOH related  Component of fatty liver certainly possible  Check hep panel  Will need outpt follow up  Reports he's quit drinking; counseled him today on need for sobriety    Alcohol abuse  Assessment & Plan  Reports his last drink was 2 wks ago  Watch for signs of withdrawal    Acute metabolic encephalopathy  Assessment & Plan  MRI negative for acute pathology, it did show mild microvascular ischemic changes  Ammonia 34  Improving, still has episodes of confusion    Acute on chronic right-sided heart failure (HCC)  Assessment & Plan  TTE showing LVEF 75%, calcified aortic valve uable to asses degree of stenosis, decreased R heart systolic function  IV lasix 40mg IV BID with good UOP.  Titrate to goal >1.5 litres neg daily  Daily BMP  Follow UOP  Replace K as needed  On BB  Start ARB        Pneumonia  Assessment & Plan  Continue iv unasyn/azithro  Aspiration pna? I have asked speech to see patient.       Abnormal CT of the chest  Assessment & Plan  cta chest reviewed  DW pulm to assess if repeat CT vs Bx is indicated  If Bx endobronchial vs transthroacic        Primary hypertension  Assessment & Plan  -Furosemide 40 mg IV BID (home dose 40 mg PO daily)  -Continue home metoprolol tartrate 100 mg daily  -amlodipine added on admission  -add olmesartan 20mg    ACP (advance care planning)  Assessment & Plan  Patient is drowsy and confused. I discussed advance care planning with the the wife at bedside, including diagnosis, prognosis, plan of care, risks and benefits of any therapies that could be offered.   We discussed regarding CODE  STATUS, CPR and intubation with mechanical ventilation, discussed regarding risk and benefits. The wife is willing to  have all life sustaining efforts. Continue full code.  ACP: 16min         VTE prophylaxis: Lovenox    I have performed a physical exam and reviewed and updated ROS and Plan today (10/8/2023). In review of yesterday's note (10/7/2023), there are no changes except as documented above.      I spent greater than 52 minutes for chart review, obtaining history independently, performing medically appropriate examination,  documenting , ordering medications, tests, or procedures, referring and communicating with other health care professionals, Independently interpreting results and communicating results with patient/family/caregiver. More than 50% of time was spent in face-to-face clinical encounter.

## 2023-10-09 PROBLEM — M25.462 EFFUSION OF BOTH KNEE JOINTS: Status: ACTIVE | Noted: 2023-10-08

## 2023-10-09 PROBLEM — M25.461 EFFUSION OF BOTH KNEE JOINTS: Status: ACTIVE | Noted: 2023-10-08

## 2023-10-09 LAB
A PREV+VAG DNA SNV QL NAA+NON-PROBE: NOT DETECTED
A PREV+VAG DNA SNV QL NAA+NON-PROBE: NOT DETECTED
ALBUMIN SERPL BCP-MCNC: 2.6 G/DL (ref 3.2–4.9)
ALBUMIN/GLOB SERPL: 0.6 G/DL
ALP SERPL-CCNC: 64 U/L (ref 30–99)
ALT SERPL-CCNC: 25 U/L (ref 2–50)
AMMONIA PLAS-SCNC: 38 UMOL/L (ref 11–45)
ANION GAP SERPL CALC-SCNC: 9 MMOL/L (ref 7–16)
APPEARANCE FLD: NORMAL
APPEARANCE FLD: NORMAL
AST SERPL-CCNC: 47 U/L (ref 12–45)
B FRAGILIS DNA SNV QL NAA+NON-PROBE: NOT DETECTED
B FRAGILIS DNA SNV QL NAA+NON-PROBE: NOT DETECTED
BACTERIA BLD CULT: NORMAL
BASOPHILS # BLD AUTO: 0.4 % (ref 0–1.8)
BASOPHILS # BLD: 0.03 K/UL (ref 0–0.12)
BILIRUB SERPL-MCNC: 0.4 MG/DL (ref 0.1–1.5)
BLACTX-M ISLT/SPM QL: NORMAL
BLACTX-M ISLT/SPM QL: NORMAL
BLAIMP ISLT/SPM QL: NORMAL
BLAIMP ISLT/SPM QL: NORMAL
BLAKPC ISLT/SPM QL: NORMAL
BLAKPC ISLT/SPM QL: NORMAL
BLAOXA-48-LIKE ISLT/SPM QL: NORMAL
BLAOXA-48-LIKE ISLT/SPM QL: NORMAL
BLAVIM ISLT/SPM QL: NORMAL
BLAVIM ISLT/SPM QL: NORMAL
BODY FLD TYPE: NORMAL
BODY FLD TYPE: NORMAL
BUN SERPL-MCNC: 50 MG/DL (ref 8–22)
C ALBICANS DNA SNV QL NAA+NON-PROBE: NOT DETECTED
C ALBICANS DNA SNV QL NAA+NON-PROBE: NOT DETECTED
C AVID+GRANUL DNA SNV QL NAA+NON-PROBE: NOT DETECTED
C AVID+GRANUL DNA SNV QL NAA+NON-PROBE: NOT DETECTED
C PERFRINGENS SNV QL NAA+NON-PROBE: NOT DETECTED
C PERFRINGENS SNV QL NAA+NON-PROBE: NOT DETECTED
CALCIUM ALBUM COR SERPL-MCNC: 10.5 MG/DL (ref 8.5–10.5)
CALCIUM SERPL-MCNC: 9.4 MG/DL (ref 8.5–10.5)
CANDIDA DNA SNV QL NAA+NON-PROBE: NOT DETECTED
CANDIDA DNA SNV QL NAA+NON-PROBE: NOT DETECTED
CHLORIDE SERPL-SCNC: 97 MMOL/L (ref 96–112)
CITROBAC SP DNA SNV QL NAA+NON-PROBE: NOT DETECTED
CITROBAC SP DNA SNV QL NAA+NON-PROBE: NOT DETECTED
CO2 SERPL-SCNC: 29 MMOL/L (ref 20–33)
COLOR FLD: NORMAL
COLOR FLD: YELLOW
CREAT SERPL-MCNC: 1.09 MG/DL (ref 0.5–1.4)
CRYSTALS FLD MICRO: NORMAL
CRYSTALS FLD MICRO: NORMAL
E CLOAC COMP DNA SNV QL NAA+NON-PROBE: NOT DETECTED
E CLOAC COMP DNA SNV QL NAA+NON-PROBE: NOT DETECTED
E COLI DNA SNV QL NAA+NON-PROBE: NOT DETECTED
E COLI DNA SNV QL NAA+NON-PROBE: NOT DETECTED
E FAECALIS DNA SNV QL NAA+NON-PROBE: NOT DETECTED
E FAECALIS DNA SNV QL NAA+NON-PROBE: NOT DETECTED
E FAECIUM DNA SNV QL NAA+NON-PROBE: NOT DETECTED
E FAECIUM DNA SNV QL NAA+NON-PROBE: NOT DETECTED
EOSINOPHIL # BLD AUTO: 0.19 K/UL (ref 0–0.51)
EOSINOPHIL NFR BLD: 2.8 % (ref 0–6.9)
ERYTHROCYTE [DISTWIDTH] IN BLOOD BY AUTOMATED COUNT: 57 FL (ref 35.9–50)
F MAGNA DNA SNV QL NAA+NON-PROBE: NOT DETECTED
F MAGNA DNA SNV QL NAA+NON-PROBE: NOT DETECTED
GFR SERPLBLD CREATININE-BSD FMLA CKD-EPI: 75 ML/MIN/1.73 M 2
GLOBULIN SER CALC-MCNC: 4.1 G/DL (ref 1.9–3.5)
GLUCOSE BLD STRIP.AUTO-MCNC: 113 MG/DL (ref 65–99)
GLUCOSE BLD STRIP.AUTO-MCNC: 150 MG/DL (ref 65–99)
GLUCOSE BLD STRIP.AUTO-MCNC: 153 MG/DL (ref 65–99)
GLUCOSE BLD STRIP.AUTO-MCNC: 169 MG/DL (ref 65–99)
GLUCOSE SERPL-MCNC: 144 MG/DL (ref 65–99)
GP B STREP DNA SNV QL NAA+NON-PROBE: NOT DETECTED
GP B STREP DNA SNV QL NAA+NON-PROBE: NOT DETECTED
GRAM STN SPEC: NORMAL
GRAM STN SPEC: NORMAL
HAEM INFLU DNA SNV QL NAA+NON-PROBE: NOT DETECTED
HAEM INFLU DNA SNV QL NAA+NON-PROBE: NOT DETECTED
HCT VFR BLD AUTO: 55.2 % (ref 42–52)
HGB BLD-MCNC: 17.2 G/DL (ref 14–18)
IMM GRANULOCYTES # BLD AUTO: 0.03 K/UL (ref 0–0.11)
IMM GRANULOCYTES NFR BLD AUTO: 0.4 % (ref 0–0.9)
K KINGAE DNA SNV QL NAA+NON-PROBE: NOT DETECTED
K KINGAE DNA SNV QL NAA+NON-PROBE: NOT DETECTED
K. AEROGENES DNA SNV QL NAA+NON-PROBE: NOT DETECTED
K. AEROGENES DNA SNV QL NAA+NON-PROBE: NOT DETECTED
K. PNEUMON GROUP DNA SNV QL NAA+NON-PRB: NOT DETECTED
K. PNEUMON GROUP DNA SNV QL NAA+NON-PRB: NOT DETECTED
LYMPHOCYTES # BLD AUTO: 1.07 K/UL (ref 1–4.8)
LYMPHOCYTES NFR BLD: 15.7 % (ref 22–41)
LYMPHOCYTES NFR FLD: 4 %
M. MORGANII DNA SNV QL NAA+NON-PROBE: NOT DETECTED
M. MORGANII DNA SNV QL NAA+NON-PROBE: NOT DETECTED
MAGNESIUM SERPL-MCNC: 2.1 MG/DL (ref 1.5–2.5)
MCH RBC QN AUTO: 30 PG (ref 27–33)
MCHC RBC AUTO-ENTMCNC: 31.2 G/DL (ref 32.3–36.5)
MCV RBC AUTO: 96.3 FL (ref 81.4–97.8)
MECA+MECC+MREJ ISLT/SPM QL: NORMAL
MECA+MECC+MREJ ISLT/SPM QL: NORMAL
MONOCYTES # BLD AUTO: 1.31 K/UL (ref 0–0.85)
MONOCYTES NFR BLD AUTO: 19.2 % (ref 0–13.4)
N GONORRHOEA DNA SNV QL NAA+NON-PROBE: NOT DETECTED
N GONORRHOEA DNA SNV QL NAA+NON-PROBE: NOT DETECTED
NDM (CARBAPENEMASE), PCR L739821A: NORMAL
NDM (CARBAPENEMASE), PCR L739821A: NORMAL
NEUTROPHILS # BLD AUTO: 4.18 K/UL (ref 1.82–7.42)
NEUTROPHILS NFR BLD: 61.5 % (ref 44–72)
NEUTROPHILS NFR FLD: 100 %
NEUTROPHILS NFR FLD: 96 %
NRBC # BLD AUTO: 0 K/UL
NRBC BLD-RTO: 0 /100 WBC (ref 0–0.2)
NUC CELL # FLD: 8930 CELLS/UL
NUC CELL # FLD: NORMAL CELLS/UL
P AERUGINOSA DNA SNV QL NAA+NON-PROBE: NOT DETECTED
P AERUGINOSA DNA SNV QL NAA+NON-PROBE: NOT DETECTED
P ANAEROBIUS DNA SNV QL NAA+NON-PROBE: NOT DETECTED
P ANAEROBIUS DNA SNV QL NAA+NON-PROBE: NOT DETECTED
P MICRA DNA SNV QL NAA+NON-PROBE: NOT DETECTED
P MICRA DNA SNV QL NAA+NON-PROBE: NOT DETECTED
PEPTONIPHILUS SP DNA SNV QL NAA+NON-PRB: NOT DETECTED
PEPTONIPHILUS SP DNA SNV QL NAA+NON-PRB: NOT DETECTED
PHOSPHATE SERPL-MCNC: 5.4 MG/DL (ref 2.5–4.5)
PLATELET # BLD AUTO: 338 K/UL (ref 164–446)
PMV BLD AUTO: 9.2 FL (ref 9–12.9)
POTASSIUM SERPL-SCNC: 5.1 MMOL/L (ref 3.6–5.5)
POTASSIUM SERPL-SCNC: 5.6 MMOL/L (ref 3.6–5.5)
PROT SERPL-MCNC: 6.7 G/DL (ref 6–8.2)
PROTEUS SP DNA SNV QL NAA+NON-PROBE: NOT DETECTED
PROTEUS SP DNA SNV QL NAA+NON-PROBE: NOT DETECTED
RBC # BLD AUTO: 5.73 M/UL (ref 4.7–6.1)
RBC # FLD: <2000 CELLS/UL
RBC # FLD: NORMAL CELLS/UL
S AUREUS DNA SNV QL NAA+NON-PROBE: NOT DETECTED
S AUREUS DNA SNV QL NAA+NON-PROBE: NOT DETECTED
S LUGDUNENSIS DNA SNV QL NAA+NON-PROBE: NOT DETECTED
S LUGDUNENSIS DNA SNV QL NAA+NON-PROBE: NOT DETECTED
S MARCESCENS DNA SNV QL NAA+NON-PROBE: NOT DETECTED
S MARCESCENS DNA SNV QL NAA+NON-PROBE: NOT DETECTED
S PNEUM DNA SNV QL NAA+NON-PROBE: NOT DETECTED
S PNEUM DNA SNV QL NAA+NON-PROBE: NOT DETECTED
S PYO DNA SNV QL NAA+NON-PROBE: NOT DETECTED
S PYO DNA SNV QL NAA+NON-PROBE: NOT DETECTED
SALMONELLA DNA SNV QL NAA+NON-PROBE: NOT DETECTED
SALMONELLA DNA SNV QL NAA+NON-PROBE: NOT DETECTED
SIGNIFICANT IND 70042: NORMAL
SITE SITE: NORMAL
SODIUM SERPL-SCNC: 135 MMOL/L (ref 135–145)
SOURCE SOURCE: NORMAL
STREPTOCOCCUS DNA SNV QL NAA+NON-PROBE: NOT DETECTED
STREPTOCOCCUS DNA SNV QL NAA+NON-PROBE: NOT DETECTED
VANA+VANB ISLT/SPM QL: NORMAL
VANA+VANB ISLT/SPM QL: NORMAL
WBC # BLD AUTO: 6.8 K/UL (ref 4.8–10.8)

## 2023-10-09 PROCEDURE — 0S9D3ZX DRAINAGE OF LEFT KNEE JOINT, PERCUTANEOUS APPROACH, DIAGNOSTIC: ICD-10-PCS | Performed by: ORTHOPAEDIC SURGERY

## 2023-10-09 PROCEDURE — 83735 ASSAY OF MAGNESIUM: CPT

## 2023-10-09 PROCEDURE — 0S9C3ZX DRAINAGE OF RIGHT KNEE JOINT, PERCUTANEOUS APPROACH, DIAGNOSTIC: ICD-10-PCS | Performed by: ORTHOPAEDIC SURGERY

## 2023-10-09 PROCEDURE — 87205 SMEAR GRAM STAIN: CPT

## 2023-10-09 PROCEDURE — 80053 COMPREHEN METABOLIC PANEL: CPT

## 2023-10-09 PROCEDURE — A9270 NON-COVERED ITEM OR SERVICE: HCPCS | Performed by: INTERNAL MEDICINE

## 2023-10-09 PROCEDURE — 700102 HCHG RX REV CODE 250 W/ 637 OVERRIDE(OP): Performed by: INTERNAL MEDICINE

## 2023-10-09 PROCEDURE — 82962 GLUCOSE BLOOD TEST: CPT

## 2023-10-09 PROCEDURE — 87999 UNLISTED MICROBIOLOGY PX: CPT

## 2023-10-09 PROCEDURE — 82140 ASSAY OF AMMONIA: CPT

## 2023-10-09 PROCEDURE — 97530 THERAPEUTIC ACTIVITIES: CPT

## 2023-10-09 PROCEDURE — A9270 NON-COVERED ITEM OR SERVICE: HCPCS | Performed by: STUDENT IN AN ORGANIZED HEALTH CARE EDUCATION/TRAINING PROGRAM

## 2023-10-09 PROCEDURE — 700111 HCHG RX REV CODE 636 W/ 250 OVERRIDE (IP): Mod: JZ | Performed by: HOSPITALIST

## 2023-10-09 PROCEDURE — 87070 CULTURE OTHR SPECIMN AEROBIC: CPT | Mod: 91

## 2023-10-09 PROCEDURE — 85025 COMPLETE CBC W/AUTO DIFF WBC: CPT

## 2023-10-09 PROCEDURE — 89060 EXAM SYNOVIAL FLUID CRYSTALS: CPT

## 2023-10-09 PROCEDURE — 36415 COLL VENOUS BLD VENIPUNCTURE: CPT

## 2023-10-09 PROCEDURE — A9270 NON-COVERED ITEM OR SERVICE: HCPCS

## 2023-10-09 PROCEDURE — 89051 BODY FLUID CELL COUNT: CPT

## 2023-10-09 PROCEDURE — 84100 ASSAY OF PHOSPHORUS: CPT

## 2023-10-09 PROCEDURE — 99232 SBSQ HOSP IP/OBS MODERATE 35: CPT | Performed by: STUDENT IN AN ORGANIZED HEALTH CARE EDUCATION/TRAINING PROGRAM

## 2023-10-09 PROCEDURE — A9270 NON-COVERED ITEM OR SERVICE: HCPCS | Performed by: HOSPITALIST

## 2023-10-09 PROCEDURE — 84132 ASSAY OF SERUM POTASSIUM: CPT

## 2023-10-09 PROCEDURE — 700102 HCHG RX REV CODE 250 W/ 637 OVERRIDE(OP): Performed by: STUDENT IN AN ORGANIZED HEALTH CARE EDUCATION/TRAINING PROGRAM

## 2023-10-09 PROCEDURE — 700102 HCHG RX REV CODE 250 W/ 637 OVERRIDE(OP): Performed by: HOSPITALIST

## 2023-10-09 PROCEDURE — 700102 HCHG RX REV CODE 250 W/ 637 OVERRIDE(OP)

## 2023-10-09 PROCEDURE — 770020 HCHG ROOM/CARE - TELE (206)

## 2023-10-09 PROCEDURE — 99222 1ST HOSP IP/OBS MODERATE 55: CPT | Mod: 57 | Performed by: ORTHOPAEDIC SURGERY

## 2023-10-09 RX ORDER — TAMSULOSIN HYDROCHLORIDE 0.4 MG/1
0.4 CAPSULE ORAL
Status: DISCONTINUED | OUTPATIENT
Start: 2023-10-09 | End: 2023-10-11 | Stop reason: HOSPADM

## 2023-10-09 RX ADMIN — INSULIN HUMAN 2 UNITS: 100 INJECTION, SOLUTION PARENTERAL at 20:39

## 2023-10-09 RX ADMIN — ENOXAPARIN SODIUM 40 MG: 100 INJECTION SUBCUTANEOUS at 16:51

## 2023-10-09 RX ADMIN — CEPHALEXIN 500 MG: 500 CAPSULE ORAL at 00:20

## 2023-10-09 RX ADMIN — OXYCODONE HYDROCHLORIDE 5 MG: 5 TABLET ORAL at 06:14

## 2023-10-09 RX ADMIN — ENOXAPARIN SODIUM 40 MG: 100 INJECTION SUBCUTANEOUS at 06:14

## 2023-10-09 RX ADMIN — DOCUSATE SODIUM 50 MG AND SENNOSIDES 8.6 MG 2 TABLET: 8.6; 5 TABLET, FILM COATED ORAL at 16:51

## 2023-10-09 RX ADMIN — CEPHALEXIN 500 MG: 500 CAPSULE ORAL at 16:51

## 2023-10-09 RX ADMIN — DOCUSATE SODIUM 50 MG AND SENNOSIDES 8.6 MG 2 TABLET: 8.6; 5 TABLET, FILM COATED ORAL at 06:14

## 2023-10-09 RX ADMIN — CEPHALEXIN 500 MG: 500 CAPSULE ORAL at 06:14

## 2023-10-09 RX ADMIN — TAMSULOSIN HYDROCHLORIDE 0.4 MG: 0.4 CAPSULE ORAL at 11:38

## 2023-10-09 RX ADMIN — Medication 100 MG: at 06:14

## 2023-10-09 RX ADMIN — METOPROLOL SUCCINATE 100 MG: 100 TABLET, EXTENDED RELEASE ORAL at 06:15

## 2023-10-09 RX ADMIN — AMLODIPINE BESYLATE 10 MG: 10 TABLET ORAL at 06:15

## 2023-10-09 RX ADMIN — CEPHALEXIN 500 MG: 500 CAPSULE ORAL at 11:43

## 2023-10-09 ASSESSMENT — GAIT ASSESSMENTS: GAIT LEVEL OF ASSIST: UNABLE TO PARTICIPATE

## 2023-10-09 ASSESSMENT — COGNITIVE AND FUNCTIONAL STATUS - GENERAL
MOBILITY SCORE: 7
MOVING FROM LYING ON BACK TO SITTING ON SIDE OF FLAT BED: UNABLE
SUGGESTED CMS G CODE MODIFIER MOBILITY: CM
MOVING TO AND FROM BED TO CHAIR: UNABLE
CLIMB 3 TO 5 STEPS WITH RAILING: TOTAL
STANDING UP FROM CHAIR USING ARMS: TOTAL
TURNING FROM BACK TO SIDE WHILE IN FLAT BAD: A LOT
WALKING IN HOSPITAL ROOM: TOTAL

## 2023-10-09 ASSESSMENT — PAIN DESCRIPTION - PAIN TYPE: TYPE: ACUTE PAIN

## 2023-10-09 ASSESSMENT — FIBROSIS 4 INDEX: FIB4 SCORE: 1.84

## 2023-10-09 NOTE — CONSULTS
"10/9/2023    Time Called: 8:16a  Time Arrived: 8:25a      HPI: Blair Celeste is a 68 y.o. male who presents with bilateral knee effusion and pain in the setting of being admitted for respiratory failure with finding of left lower lobe infiltrate versus mass.  Patient currently complains of bilateral knee pain.  He states he does have bilateral knee pain at baseline but it has worsened in the past few days.    Past Medical History:   Diagnosis Date    COPD (chronic obstructive pulmonary disease) (HCC)     Diabetes (HCC)     Hypertension        Past Surgical History:   Procedure Laterality Date    APPENDECTOMY      OTHER ABDOMINAL SURGERY         Medications  No current facility-administered medications on file prior to encounter.     Current Outpatient Medications on File Prior to Encounter   Medication Sig Dispense Refill    furosemide (LASIX) 40 MG Tab Take 40 mg by mouth every day.      metoprolol tartrate (LOPRESSOR) 100 MG Tab Take 100 mg by mouth every day.      allopurinol (ZYLOPRIM) 100 MG Tab Take 100 mg by mouth every day.      dapagliflozin propanediol (FARXIGA) 10 MG Tab Take 10 mg by mouth every day.         Allergies  Patient has no known allergies.    ROS  Negative except as indicated in the HPI    History reviewed. No pertinent family history.    Social History     Socioeconomic History    Marital status:    Tobacco Use    Smoking status: Former     Types: Cigars     Start date:      Quit date: 2000     Years since quittin.7    Smokeless tobacco: Never   Substance and Sexual Activity    Alcohol use: Not Currently    Drug use: Never       Physical Exam  Vitals  /71   Pulse 83   Temp 36.2 °C (97.2 °F) (Temporal)   Resp 18   Ht 1.854 m (6' 1\")   Wt (!) 155 kg (341 lb 11.4 oz)   SpO2 92%   General: Well Developed, Well Nourished, Age appropriate appearance  HEENT: Normocephalic, atraumatic  Psych: Normal mood and affect  Neck: Supple, nontender, no masses   Lungs: " Breathing unlabored, No audible wheezing  Heart: Regular rate  Abdomen: ND  MSK:   On inspection of bilateral lower extremities he has gross knee effusions to bilateral knees.  He only tolerates approximately 10 to 20 degrees of arc of motion bilaterally secondary to pain.  Neurovascular intact distally in the feet.      Radiographs:  X-ray of the right knee demonstrates end-stage medial compartment arthritis with knee effusion.    X-ray left knee demonstrates mild tricompartmental arthritis with a large knee effusion.    DX-KNEE 2- RIGHT   Final Result         1. No acute osseous abnormality.      DX-KNEE 2- LEFT   Final Result      1.  No evidence of acute fracture or dislocation.      2.  Mild tricompartment degenerative change.      3.  Joint effusion.      US-EXTREMITY VENOUS LOWER BILAT   Final Result      MR-BRAIN-W/O   Final Result      1.  Markedly motion degraded exam.   2.  No gross acute intracranial abnormality.   3.  Mild white matter disease representing microvascular ischemic changes.      US-RENAL   Final Result      1.  Left renal cysts.      2.  No hydronephrosis. No renal calculus.      US-ABDOMEN LTD (SOFT TISSUE)   Final Result      No ascites is visualized.      DX-CHEST-PORTABLE (1 VIEW)   Final Result         1.  Linear densities in the right midlung and left lung base suggesting atelectasis or infiltrate.   2.  Cardiomegaly      EC-ECHOCARDIOGRAM COMPLETE W/O CONT   Final Result      CT-CTA NECK WITH & W/O-POST PROCESSING   Final Result      Atherosclerosis of the carotid bifurcations and bulbs without hemodynamically significant stenosis.      Patent vertebral arteries.      CT-CTA HEAD WITH & W/O-POST PROCESS   Final Result      No large vessel occlusion or hemodynamically significant stenosis or aneurysm.      CT-CEREBRAL PERFUSION ANALYSIS   Final Result      1.  Small focus of potentially reversible ischemia in the left parietal lobe.      CT-HEAD W/O   Final Result      No acute  intracranial abnormality.                  CT-CTA CHEST PULMONARY ARTERY W/ RECONS   Final Result         1.  No large central pulmonary embolus is appreciated, evaluation of the subsegmental branches is essentially nondiagnostic due to motion artifacts. Additional imaging would be required for definitive exclusion of small distal pulmonary emboli.   2.  Masslike consolidation in the right lower lobe, could represent round atelectasis or infiltrate however appearance is concerning for pulmonary mass. Should be considered neoplastic less frequent otherwise.   3.  Hazy right lower lobe opacities suggests component of superimposed infiltrate.   4.  Trace right pleural effusion.   5.  Mediastinal and right hilar lymph nodes, appearance concerning for possible metastatic disease given pulmonary finding.   6.  Fluid in the left upper quadrant adjacent to the spleen, density most typical of ascites   7.  Irregular hepatic contour compatible with changes of cirrhosis.   8.  Atherosclerosis and atherosclerotic coronary artery disease.   9.  Pulmonary nodule, see nodule follow-up recommendations below.      Fleischner Society pulmonary nodule recommendations:   Low and High Risk: Consider CT at 3 months, PET/CT, or tissue sampling.      Low Risk - Minimal or absent history of smoking and of other known risk factors.      High Risk - History of smoking or of other known risk factors.      Note: These recommendations do not apply to lung cancer screening, patients with immunosuppression, or patients with known primary cancer.      Fleischner Society 2017 Guidelines for Management of Incidentally Detected Pulmonary Nodules in Adults             Laboratory Values  Recent Labs     10/07/23  0107 10/08/23  0723 10/09/23  0350   WBC 10.1 8.1 6.8   RBC 5.76 5.85 5.73   HEMOGLOBIN 17.8 17.5 17.2   HEMATOCRIT 55.1* 55.5* 55.2*   MCV 95.7 94.9 96.3   MCH 30.9 29.9 30.0   MCHC 32.3 31.5* 31.2*   RDW 56.0* 56.7* 57.0*   PLATELETCT 315 327  338   MPV 9.2 9.5 9.2     Recent Labs     10/07/23  0107 10/08/23  0723 10/09/23  0025   SODIUM 134* 136 135   POTASSIUM 5.0 5.0 5.6*   CHLORIDE 92* 93* 97   CO2 31 31 29   GLUCOSE 141* 129* 144*   BUN 54* 54* 50*             Assessment: 68-year-old male with bilateral knee effusions and knee pain in the setting of possible pulmonary infection versus neoplasm    Plan:   Plan for bilateral knee aspiration and sent for cell count, Gram stain and culture, and crystal analysis  N.p.o. for now pending aspiration results      Luis M Gentile MD  Orthopedic Trauma

## 2023-10-09 NOTE — PROGRESS NOTES
Bilateral knee aspirate consistent with gout.  Recommend medical treatment with antiinflammatories.  No orthopedic follow-up necessary.

## 2023-10-09 NOTE — PROGRESS NOTES
Assumed care of patient and received report from RN. Tele monitor in place and patient in SR. VS stable. A&Ox4. Pain 8/10 and medicated per MAR. POC discussed with patient and verbalized understanding. Call light in reach. Fall precautions in place. Bed locked in lowest position.

## 2023-10-09 NOTE — PROCEDURES
Patient seen per request of Dr. Gentile for aspiration of bilateral knees.  Clinical exam of the knee showed marked pain with ROM and palpation.  Xrays were negative for fracture and showed moderate joint effusion.  No cutaneous infection noted.  The indications, risks, alternatives, benefits, and alternatives of joint aspiration were presented to the patient.  Knowing full well they wished to proceed and consent was signed.  The knee was first prepped with chloraprep. 35cc of cloudy straw colored synovial fluid was aspirated from both knees using a 18 gauge 3 inch spinal needle.  The patient tolerated the procedure well. Blood loss was less than 1cc.  The aspirate was hand delivered to the lab for culture/gram, cell count, crystal analysis, and joint infection panel. Samples walked down to lab for analysis. Results pending.

## 2023-10-09 NOTE — PROGRESS NOTES
Assumed care of patient. A&Ox3 but able to reorient. Max assist to EOB at this time but follows commands well. Updated on plan of care and updated white board. All needs met at this time; will continue to monitor.

## 2023-10-09 NOTE — PROGRESS NOTES
Monitor Summary:   Rhythm: SB/SR  Rate: 78-85  Measurement: .19/.10/.35  Ectopy: PVC (R)

## 2023-10-09 NOTE — DISCHARGE PLANNING
Case Management Discharge Planning    Admission Date: 10/3/2023  GMLOS: 3.9  ALOS: 5    6-Clicks ADL Score: 14  6-Clicks Mobility Score: 9  PT and/or OT Eval ordered: Yes  PT/OT: Recommend post acute placement  Post-acute Referrals Ordered: Yes  Post-acute Choice Obtained: No  Has referral(s) been sent to post-acute provider:  Yes      Anticipated Discharge Dispo: Discharge Disposition: D/T to SNF with Medicare cert in anticipation of skilled care (03)  Per chart review pt resides in [place], [place]      Family support:  Name Relation Home Work Mobile   Anh Celeste Spouse [phone]     DME Needed: No    Action(s) Taken: Referral(s) sent LMSW spoke with wife to update her on where we are at. LMSW informed her that we had sent out referrals for SNF's closer to their address which includes [place]. Pt's wife would like the SNF in [place] due to it being closer to their home and easier to visit him. LMSW informed if we do not have accepting SNF's we also sent referrals to local SNF as well. Pt's wife understood.    Escalations Completed: None    Medically Clear: No    Next Steps: LMSW will follow up with the SNF in [place] to see if they have received the referral.     Barriers to Discharge: Medical clearance    Is the patient up for discharge tomorrow: No

## 2023-10-09 NOTE — CARE PLAN
The patient is Watcher - Medium risk of patient condition declining or worsening    Shift Goals  Clinical Goals: monitor resp status, pain control, Q2 turns, I & Os  Patient Goals: sleep and pain control  Family Goals: At bedside    Progress made toward(s) clinical / shift goals:      Problem: Pain - Standard  Goal: Alleviation of pain or a reduction in pain to the patient’s comfort goal  Outcome: Progressing  Note: Pain assessed using the 0-10 pain scale. Pt experiencing 8/10 pain in bilateral knees and medicated per MAR. Pt educated on non-pharmacological interventions including repositioning, distraction and heat/cold packs.       Problem: Skin Integrity  Goal: Skin integrity is maintained or improved  Outcome: Progressing  Note: Skin integrity monitored throughout the shift. Pressure ulcer preventions measures in place. TAPs, q2 turns with wedges, bariatric ow airloss, foam pads for NC, and back for retention and moisture prevention.

## 2023-10-09 NOTE — DISCHARGE PLANNING
Received choice form at: [id number]  Agency/Facility name: [facility]  Referral sent per choice form at:  [phone]    Agency/Facility name: [facility]  Spoke to: [place]  Outcome: pending accepting patient, waiting on authorization, and is concerned about the possibility of a trapeze under the bed. [facility]'s beds cannot accommodate a trapeze under them

## 2023-10-09 NOTE — PROGRESS NOTES
Hospital Medicine Daily Progress Note    Date of Service  10/9/2023    Chief Complaint  Blair Celeste is a 68 y.o. male admitted 10/3/2023 with shortness of breath    Hospital Course  68 y.o. male who presented 10/3/2023 with a PMHx of ETOH abuse, tobacco abuse, DM, HTN, BMI 43, COPD.  Presented 10/4 with SOB.  CTPA neg for PE but did show large LLL infiltrate.  Incidentally noted to have a pulmonary mass.  Admitted to ICU on BiPAP, Abx's.  ETOH cirrhosis.    Patient transferred out of ICU 10/5    Distended abdomen -ultrasound negative for ascites    Echo showed normal EF of 70%. right ventricle is dilated with reduced right ventricular systolic function.    Interval Problem Update  Patient was seen and examined at bedside  AO times x3  Episodes of confusion  Care plan was discussed and updated with the wife at the bedside    Reported bilateral knee pain,  x-ray showed effusion.  I discussed that with orthopedics, plan for arthrocentesis today    UTI on Keflex    Respiratory status continued to improve, Bipap > 5L> 3L     nonsustained V. Tach-continue telemetry monitor , optimize electrolytes potassium over 4 and mag over 2, continue metoprolol    JOSLYN resolved-May resume Lasix, ARBs on hold  Right heart failure -Lasix on hold due to JOSLYN    AMS -MRI negative for acute pathology, it did show mild microvascular ischemic changes.  History of EtOH cirrhosis.  Ammonia 38    Masslike consolidation in the right lower lobe - repeat CT in a month    I have discussed this patient's plan of care and discharge plan at IDT rounds today with Case Management, Nursing, Nursing leadership, and other members of the IDT team.    Consultants/Specialty      Code Status  Full Code    Disposition  The patient is not medically cleared for discharge to home or a post-acute facility.      I have placed the appropriate orders for post-discharge needs.    Review of Systems  All 12 systems were reviewed and negative except as mentioned  above       Physical Exam  Temp:  [36.2 °C (97.2 °F)-36.7 °C (98.1 °F)] 36.4 °C (97.5 °F)  Pulse:  [75-83] 76  Resp:  [18] 18  BP: (115-129)/(64-71) 119/69  SpO2:  [91 %-94 %] 93 %    Physical Exam  Constitutional:       Appearance: He is ill-appearing.   HENT:      Head: Normocephalic.      Mouth/Throat:      Mouth: Mucous membranes are moist.   Eyes:      Extraocular Movements: Extraocular movements intact.      Conjunctiva/sclera: Conjunctivae normal.   Cardiovascular:      Rate and Rhythm: Normal rate and regular rhythm.      Pulses: Normal pulses.   Pulmonary:      Effort: Pulmonary effort is normal.      Comments: Decrease breathing sounds  Abdominal:      General: Bowel sounds are normal. There is distension.      Palpations: Abdomen is soft.      Tenderness: There is no abdominal tenderness.   Musculoskeletal:         General: No swelling or tenderness.      Cervical back: Normal range of motion and neck supple.   Skin:     General: Skin is warm.   Neurological:      Mental Status: He is alert. He is disoriented.      Comments: Drowsy, AO times 2-3   Psychiatric:         Mood and Affect: Mood normal.         Fluids    Intake/Output Summary (Last 24 hours) at 10/9/2023 1530  Last data filed at 10/9/2023 0500  Gross per 24 hour   Intake 400 ml   Output 3500 ml   Net -3100 ml       Laboratory  Recent Labs     10/07/23  0107 10/08/23  0723 10/09/23  0350   WBC 10.1 8.1 6.8   RBC 5.76 5.85 5.73   HEMOGLOBIN 17.8 17.5 17.2   HEMATOCRIT 55.1* 55.5* 55.2*   MCV 95.7 94.9 96.3   MCH 30.9 29.9 30.0   MCHC 32.3 31.5* 31.2*   RDW 56.0* 56.7* 57.0*   PLATELETCT 315 327 338   MPV 9.2 9.5 9.2     Recent Labs     10/07/23  0107 10/08/23  0723 10/09/23  0025 10/09/23  1400   SODIUM 134* 136 135  --    POTASSIUM 5.0 5.0 5.6* 5.1   CHLORIDE 92* 93* 97  --    CO2 31 31 29  --    GLUCOSE 141* 129* 144*  --    BUN 54* 54* 50*  --    CREATININE 2.25* 1.52* 1.09  --    CALCIUM 9.1 9.2 9.4  --                        Imaging  DX-KNEE  2- RIGHT   Final Result         1. No acute osseous abnormality.      DX-KNEE 2- LEFT   Final Result      1.  No evidence of acute fracture or dislocation.      2.  Mild tricompartment degenerative change.      3.  Joint effusion.      US-EXTREMITY VENOUS LOWER BILAT   Final Result      MR-BRAIN-W/O   Final Result      1.  Markedly motion degraded exam.   2.  No gross acute intracranial abnormality.   3.  Mild white matter disease representing microvascular ischemic changes.      US-RENAL   Final Result      1.  Left renal cysts.      2.  No hydronephrosis. No renal calculus.      US-ABDOMEN LTD (SOFT TISSUE)   Final Result      No ascites is visualized.      DX-CHEST-PORTABLE (1 VIEW)   Final Result         1.  Linear densities in the right midlung and left lung base suggesting atelectasis or infiltrate.   2.  Cardiomegaly      EC-ECHOCARDIOGRAM COMPLETE W/O CONT   Final Result      CT-CTA NECK WITH & W/O-POST PROCESSING   Final Result      Atherosclerosis of the carotid bifurcations and bulbs without hemodynamically significant stenosis.      Patent vertebral arteries.      CT-CTA HEAD WITH & W/O-POST PROCESS   Final Result      No large vessel occlusion or hemodynamically significant stenosis or aneurysm.      CT-CEREBRAL PERFUSION ANALYSIS   Final Result      1.  Small focus of potentially reversible ischemia in the left parietal lobe.      CT-HEAD W/O   Final Result      No acute intracranial abnormality.                  CT-CTA CHEST PULMONARY ARTERY W/ RECONS   Final Result         1.  No large central pulmonary embolus is appreciated, evaluation of the subsegmental branches is essentially nondiagnostic due to motion artifacts. Additional imaging would be required for definitive exclusion of small distal pulmonary emboli.   2.  Masslike consolidation in the right lower lobe, could represent round atelectasis or infiltrate however appearance is concerning for pulmonary mass. Should be considered neoplastic  less frequent otherwise.   3.  Hazy right lower lobe opacities suggests component of superimposed infiltrate.   4.  Trace right pleural effusion.   5.  Mediastinal and right hilar lymph nodes, appearance concerning for possible metastatic disease given pulmonary finding.   6.  Fluid in the left upper quadrant adjacent to the spleen, density most typical of ascites   7.  Irregular hepatic contour compatible with changes of cirrhosis.   8.  Atherosclerosis and atherosclerotic coronary artery disease.   9.  Pulmonary nodule, see nodule follow-up recommendations below.      Fleischner Society pulmonary nodule recommendations:   Low and High Risk: Consider CT at 3 months, PET/CT, or tissue sampling.      Low Risk - Minimal or absent history of smoking and of other known risk factors.      High Risk - History of smoking or of other known risk factors.      Note: These recommendations do not apply to lung cancer screening, patients with immunosuppression, or patients with known primary cancer.      Fleischner Society 2017 Guidelines for Management of Incidentally Detected Pulmonary Nodules in Adults              Assessment/Plan  * Acute on chronic respiratory failure with hypoxia and hypercapnia (HCC)- (present on admission)  Assessment & Plan  In setting of untreated RAYMOND  R heart failure +/- pneumonia, possible COPD  Responding to diuresis  Cont O2/RT protocols    JOSLYN (acute kidney injury) (HCC)  Assessment & Plan  New JOSLYN, Cre 0.8> 1.84,   Likely due to overdiuresis and IV contrast    hold diuretics and ARBs    10/7 Worsening JOSLYN, Cre 0.8> 1.84>2, hold diuretics and ARBs.   I discussed with the nephrology, continue to hold Lasix, ordered urine analysis    10/9 resolved. May resume Lasix, ARBs on hold  Avoid nephrotoxic agent    Effusion of both knee joints  Assessment & Plan  Ultrasound negative for DVT   x-ray showed effusion.    I discussed that with orthopedics, plan for arthrocentesis today    Pain in both  knees  Assessment & Plan  Ordered x-ray  Pain management    Acute cystitis  Assessment & Plan  Reported the dysuria -UA positive for WBC, urine culture pending,  Patient received antibiotics recently, which may mask the UA result   started on Keflex given his symptoms  Urine culture pending      Nonsustained ventricular tachycardia (HCC)  Assessment & Plan  19 beats of VTACH. Pt asymptomatic    10/9 V. tach 6 seconds  Optimize electrolytes, potassium over 4 and mag over 2  Continue telemetry monitor  Continue metoprolol    Obstructive sleep apnea  Assessment & Plan  CPAP while in house  See if we can arrange for home    Type 2 diabetes mellitus (HCC)  Assessment & Plan  A1c 8.5   On dapagliflozin as outpt  Covering with SSI while in house  Add glargine if needed    Cirrhosis (HCC)  Assessment & Plan  Likely ETOH related  Component of fatty liver certainly possible  Check hep panel  Will need outpt follow up  Reports he's quit drinking; counseled him today on need for sobriety    Alcohol abuse  Assessment & Plan  Reports his last drink was 2 wks ago  Watch for signs of withdrawal    Acute metabolic encephalopathy  Assessment & Plan  MRI negative for acute pathology, it did show mild microvascular ischemic changes  History of EtOH cirrhosis.  Ammonia 38  Improving, still has episodes of confusion    Acute on chronic right-sided heart failure (HCC)  Assessment & Plan  TTE showing LVEF 75%, calcified aortic valve uable to asses degree of stenosis, decreased R heart systolic function  IV lasix 40mg IV BID with good UOP.  Titrate to goal >1.5 litres neg daily  Daily BMP  Follow UOP  Replace K as needed  On BB  ARB and Lasix on hold due to JOSLYN        Pneumonia  Assessment & Plan  Continue iv unasyn/azithro  Aspiration pna? I have asked speech to see patient.       Abnormal CT of the chest  Assessment & Plan  cta chest reviewed  DW pulm to assess if repeat CT vs Bx is indicated  If Bx endobronchial vs  transthroacic        Primary hypertension  Assessment & Plan  -Furosemide 40 mg IV BID (home dose 40 mg PO daily)  -Continue home metoprolol tartrate 100 mg daily  -amlodipine added on admission  -add olmesartan 20mg    ACP (advance care planning)  Assessment & Plan  Patient is drowsy and confused. I discussed advance care planning with the the wife at bedside, including diagnosis, prognosis, plan of care, risks and benefits of any therapies that could be offered.   We discussed regarding CODE STATUS, CPR and intubation with mechanical ventilation, discussed regarding risk and benefits. The wife is willing to  have all life sustaining efforts. Continue full code.  ACP: 16min         VTE prophylaxis: Lovenox    I have performed a physical exam and reviewed and updated ROS and Plan today (10/9/2023). In review of yesterday's note (10/8/2023), there are no changes except as documented above.

## 2023-10-09 NOTE — THERAPY
"Physical Therapy   Daily Treatment     Patient Name: Blair Celeste  Age:  68 y.o., Sex:  male  Medical Record #: [id number]  Today's Date: 10/9/2023     Precautions  Precautions: Fall Risk    Assessment    Pt agreeable to PT tx session, continues to demonstrate decreased strength, balance, and activity tolerance.  Pt mobilized as detailed below with min A to get to/from EOB with use of bed features.  Attempted STS with 2 person assist however unable to achieve buttocks clearance, question if pt gave full effort.  Educated pt to continue mobilizing with nursing staff daily including sitting EOB multiple times a day.  Will continue to follow.     Plan    Treatment Plan Status: Continue Current Treatment Plan  Type of Treatment: Bed Mobility, Gait Training, Neuro Re-Education / Balance, Self Care / Home Evaluation, Stair Training, Therapeutic Activities, Therapeutic Exercise  Treatment Frequency: 4 Times per Week  Treatment Duration: Until Therapy Goals Met    DC Equipment Recommendations: Unable to determine at this time  Discharge Recommendations: Recommend post-acute placement for additional physical therapy services prior to discharge home     Objective     10/09/23 1120   Precautions   Precautions Fall Risk   Pain 0 - 10 Group   Therapist Pain Assessment During Activity;Nurse Notified (c/o discomfort from catheter, not quantified)   Cognition    Cognition / Consciousness WDL   Level of Consciousness Alert   Comments Cooperative. Frequent cues/reminders to participate. E.g. ask pt to roll to left, he states \"I can try\" however requires additional cues to attempt the task   Balance   Sitting Balance (Static) Fair   Sitting Balance (Dynamic) Fair -   Weight Shift Sitting Fair   Skilled Intervention Verbal Cuing;Compensatory Strategies   Comments attempted STS, unable   Bed Mobility    Supine to Sit Minimal Assist   Sit to Supine Minimal Assist   Scooting Minimal Assist   Skilled Intervention Verbal Cuing;Tactile " Cuing;Facilitation   Comments w/ bed features & trapeze   Gait Analysis   Gait Level Of Assist Unable to Participate   Functional Mobility   Sit to Stand Maximal Assist (Unable to achieve buttocks clearance)   Bed, Chair, Wheelchair Transfer Unable to Participate   Mobility EOB   Skilled Intervention Verbal Cuing;Tactile Cuing   Activity Tolerance   Sitting Edge of Bed 20+ min   Comments limited by weakness, effort   Short Term Goals    Short Term Goal # 1 pt will move supine<>eob with spv in 6 tx for bed mobility.   Goal Outcome # 1 goal not met   Short Term Goal # 2 pt will complete spt with fww and spv in 6 tx for functional mobility.   Goal Outcome # 2 Goal not met   Short Term Goal # 3 pt will ambulate 150 ft with fww and spv in 6 tx for household distances.   Goal Outcome # 3 Goal not met   Short Term Goal # 4 pt will negotiate 3 stairs with sba in 6 tx for home access.   Goal Outcome # 4 Goal not met   Physical Therapy Treatment Plan   Physical Therapy Treatment Plan Continue Current Treatment Plan

## 2023-10-10 PROBLEM — M10.9 GOUT FLARE: Status: ACTIVE | Noted: 2023-10-08

## 2023-10-10 LAB
ALBUMIN SERPL BCP-MCNC: 2.7 G/DL (ref 3.2–4.9)
ALBUMIN/GLOB SERPL: 0.7 G/DL
ALP SERPL-CCNC: 61 U/L (ref 30–99)
ALT SERPL-CCNC: 27 U/L (ref 2–50)
ANION GAP SERPL CALC-SCNC: 6 MMOL/L (ref 7–16)
AST SERPL-CCNC: 32 U/L (ref 12–45)
BACTERIA UR CULT: NORMAL
BASOPHILS # BLD AUTO: 0.8 % (ref 0–1.8)
BASOPHILS # BLD: 0.04 K/UL (ref 0–0.12)
BILIRUB SERPL-MCNC: 0.4 MG/DL (ref 0.1–1.5)
BUN SERPL-MCNC: 32 MG/DL (ref 8–22)
CALCIUM ALBUM COR SERPL-MCNC: 10.4 MG/DL (ref 8.5–10.5)
CALCIUM SERPL-MCNC: 9.4 MG/DL (ref 8.5–10.5)
CHLORIDE SERPL-SCNC: 96 MMOL/L (ref 96–112)
CO2 SERPL-SCNC: 37 MMOL/L (ref 20–33)
CREAT SERPL-MCNC: 0.71 MG/DL (ref 0.5–1.4)
EOSINOPHIL # BLD AUTO: 0.19 K/UL (ref 0–0.51)
EOSINOPHIL NFR BLD: 3.6 % (ref 0–6.9)
ERYTHROCYTE [DISTWIDTH] IN BLOOD BY AUTOMATED COUNT: 56.7 FL (ref 35.9–50)
GFR SERPLBLD CREATININE-BSD FMLA CKD-EPI: 101 ML/MIN/1.73 M 2
GLOBULIN SER CALC-MCNC: 4 G/DL (ref 1.9–3.5)
GLUCOSE BLD STRIP.AUTO-MCNC: 129 MG/DL (ref 65–99)
GLUCOSE BLD STRIP.AUTO-MCNC: 146 MG/DL (ref 65–99)
GLUCOSE BLD STRIP.AUTO-MCNC: 174 MG/DL (ref 65–99)
GLUCOSE BLD STRIP.AUTO-MCNC: 213 MG/DL (ref 65–99)
GLUCOSE SERPL-MCNC: 133 MG/DL (ref 65–99)
HCT VFR BLD AUTO: 55.6 % (ref 42–52)
HGB BLD-MCNC: 16.8 G/DL (ref 14–18)
IMM GRANULOCYTES # BLD AUTO: 0.02 K/UL (ref 0–0.11)
IMM GRANULOCYTES NFR BLD AUTO: 0.4 % (ref 0–0.9)
LYMPHOCYTES # BLD AUTO: 1 K/UL (ref 1–4.8)
LYMPHOCYTES NFR BLD: 18.8 % (ref 22–41)
MAGNESIUM SERPL-MCNC: 1.7 MG/DL (ref 1.5–2.5)
MCH RBC QN AUTO: 29.5 PG (ref 27–33)
MCHC RBC AUTO-ENTMCNC: 30.2 G/DL (ref 32.3–36.5)
MCV RBC AUTO: 97.7 FL (ref 81.4–97.8)
MONOCYTES # BLD AUTO: 1.13 K/UL (ref 0–0.85)
MONOCYTES NFR BLD AUTO: 21.2 % (ref 0–13.4)
NEUTROPHILS # BLD AUTO: 2.94 K/UL (ref 1.82–7.42)
NEUTROPHILS NFR BLD: 55.2 % (ref 44–72)
NRBC # BLD AUTO: 0 K/UL
NRBC BLD-RTO: 0 /100 WBC (ref 0–0.2)
PHOSPHATE SERPL-MCNC: 3.9 MG/DL (ref 2.5–4.5)
PLATELET # BLD AUTO: 326 K/UL (ref 164–446)
PMV BLD AUTO: 9.3 FL (ref 9–12.9)
POTASSIUM SERPL-SCNC: 5 MMOL/L (ref 3.6–5.5)
PROT SERPL-MCNC: 6.7 G/DL (ref 6–8.2)
RBC # BLD AUTO: 5.69 M/UL (ref 4.7–6.1)
SIGNIFICANT IND 70042: NORMAL
SITE SITE: NORMAL
SODIUM SERPL-SCNC: 139 MMOL/L (ref 135–145)
SOURCE SOURCE: NORMAL
URATE SERPL-MCNC: 8.3 MG/DL (ref 2.5–8.3)
WBC # BLD AUTO: 5.3 K/UL (ref 4.8–10.8)

## 2023-10-10 PROCEDURE — 85025 COMPLETE CBC W/AUTO DIFF WBC: CPT

## 2023-10-10 PROCEDURE — A9270 NON-COVERED ITEM OR SERVICE: HCPCS | Performed by: INTERNAL MEDICINE

## 2023-10-10 PROCEDURE — 83735 ASSAY OF MAGNESIUM: CPT

## 2023-10-10 PROCEDURE — 99233 SBSQ HOSP IP/OBS HIGH 50: CPT | Performed by: INTERNAL MEDICINE

## 2023-10-10 PROCEDURE — 700102 HCHG RX REV CODE 250 W/ 637 OVERRIDE(OP): Performed by: HOSPITALIST

## 2023-10-10 PROCEDURE — 700111 HCHG RX REV CODE 636 W/ 250 OVERRIDE (IP): Mod: JZ | Performed by: INTERNAL MEDICINE

## 2023-10-10 PROCEDURE — 700102 HCHG RX REV CODE 250 W/ 637 OVERRIDE(OP)

## 2023-10-10 PROCEDURE — 36415 COLL VENOUS BLD VENIPUNCTURE: CPT

## 2023-10-10 PROCEDURE — 80053 COMPREHEN METABOLIC PANEL: CPT

## 2023-10-10 PROCEDURE — A9270 NON-COVERED ITEM OR SERVICE: HCPCS | Performed by: HOSPITALIST

## 2023-10-10 PROCEDURE — 82962 GLUCOSE BLOOD TEST: CPT | Mod: 91

## 2023-10-10 PROCEDURE — 84550 ASSAY OF BLOOD/URIC ACID: CPT

## 2023-10-10 PROCEDURE — 700111 HCHG RX REV CODE 636 W/ 250 OVERRIDE (IP): Mod: JZ | Performed by: HOSPITALIST

## 2023-10-10 PROCEDURE — A9270 NON-COVERED ITEM OR SERVICE: HCPCS

## 2023-10-10 PROCEDURE — 700102 HCHG RX REV CODE 250 W/ 637 OVERRIDE(OP): Performed by: INTERNAL MEDICINE

## 2023-10-10 PROCEDURE — 770020 HCHG ROOM/CARE - TELE (206)

## 2023-10-10 PROCEDURE — 700111 HCHG RX REV CODE 636 W/ 250 OVERRIDE (IP): Mod: JZ

## 2023-10-10 PROCEDURE — 700102 HCHG RX REV CODE 250 W/ 637 OVERRIDE(OP): Performed by: STUDENT IN AN ORGANIZED HEALTH CARE EDUCATION/TRAINING PROGRAM

## 2023-10-10 PROCEDURE — 84100 ASSAY OF PHOSPHORUS: CPT

## 2023-10-10 PROCEDURE — 97535 SELF CARE MNGMENT TRAINING: CPT | Mod: CO

## 2023-10-10 PROCEDURE — A9270 NON-COVERED ITEM OR SERVICE: HCPCS | Performed by: STUDENT IN AN ORGANIZED HEALTH CARE EDUCATION/TRAINING PROGRAM

## 2023-10-10 RX ORDER — FUROSEMIDE 10 MG/ML
40 INJECTION INTRAMUSCULAR; INTRAVENOUS
Status: DISCONTINUED | OUTPATIENT
Start: 2023-10-10 | End: 2023-10-11 | Stop reason: HOSPADM

## 2023-10-10 RX ORDER — FUROSEMIDE 40 MG/1
40 TABLET ORAL
Status: DISCONTINUED | OUTPATIENT
Start: 2023-10-10 | End: 2023-10-10

## 2023-10-10 RX ORDER — COLCHICINE 0.6 MG/1
1.2 TABLET ORAL ONCE
Status: COMPLETED | OUTPATIENT
Start: 2023-10-10 | End: 2023-10-10

## 2023-10-10 RX ORDER — COLCHICINE 0.6 MG/1
0.6 TABLET ORAL DAILY
Status: DISCONTINUED | OUTPATIENT
Start: 2023-10-10 | End: 2023-10-11 | Stop reason: HOSPADM

## 2023-10-10 RX ADMIN — ENOXAPARIN SODIUM 40 MG: 100 INJECTION SUBCUTANEOUS at 04:17

## 2023-10-10 RX ADMIN — INSULIN HUMAN 3 UNITS: 100 INJECTION, SOLUTION PARENTERAL at 13:19

## 2023-10-10 RX ADMIN — METOPROLOL SUCCINATE 100 MG: 100 TABLET, EXTENDED RELEASE ORAL at 04:18

## 2023-10-10 RX ADMIN — FUROSEMIDE 40 MG: 10 INJECTION INTRAMUSCULAR; INTRAVENOUS at 17:12

## 2023-10-10 RX ADMIN — INSULIN HUMAN 2 UNITS: 100 INJECTION, SOLUTION PARENTERAL at 17:19

## 2023-10-10 RX ADMIN — CEPHALEXIN 500 MG: 500 CAPSULE ORAL at 04:19

## 2023-10-10 RX ADMIN — COLCHICINE 0.6 MG: 0.6 TABLET, FILM COATED ORAL at 17:11

## 2023-10-10 RX ADMIN — Medication 100 MG: at 04:17

## 2023-10-10 RX ADMIN — ENOXAPARIN SODIUM 40 MG: 100 INJECTION SUBCUTANEOUS at 17:12

## 2023-10-10 RX ADMIN — DOCUSATE SODIUM 50 MG AND SENNOSIDES 8.6 MG 2 TABLET: 8.6; 5 TABLET, FILM COATED ORAL at 04:17

## 2023-10-10 RX ADMIN — COLCHICINE 1.2 MG: 0.6 TABLET, FILM COATED ORAL at 09:48

## 2023-10-10 RX ADMIN — AMLODIPINE BESYLATE 10 MG: 10 TABLET ORAL at 04:18

## 2023-10-10 RX ADMIN — OLMESARTAN MEDOXOMIL 20 MG: 20 TABLET, FILM COATED ORAL at 09:48

## 2023-10-10 RX ADMIN — CEPHALEXIN 500 MG: 500 CAPSULE ORAL at 00:32

## 2023-10-10 RX ADMIN — TAMSULOSIN HYDROCHLORIDE 0.4 MG: 0.4 CAPSULE ORAL at 09:48

## 2023-10-10 RX ADMIN — FUROSEMIDE 40 MG: 10 INJECTION INTRAMUSCULAR; INTRAVENOUS at 09:49

## 2023-10-10 ASSESSMENT — COGNITIVE AND FUNCTIONAL STATUS - GENERAL
DRESSING REGULAR LOWER BODY CLOTHING: A LOT
HELP NEEDED FOR BATHING: A LOT
TOILETING: A LOT
PERSONAL GROOMING: A LITTLE
DRESSING REGULAR UPPER BODY CLOTHING: A LITTLE
DAILY ACTIVITIY SCORE: 15
SUGGESTED CMS G CODE MODIFIER DAILY ACTIVITY: CK
EATING MEALS: A LITTLE

## 2023-10-10 ASSESSMENT — FIBROSIS 4 INDEX: FIB4 SCORE: 1.25

## 2023-10-10 ASSESSMENT — PAIN DESCRIPTION - PAIN TYPE
TYPE: ACUTE PAIN
TYPE: ACUTE PAIN

## 2023-10-10 NOTE — THERAPY
"Occupational Therapy  Daily Treatment     Patient Name: Blair Celeste  Age:  68 y.o., Sex:  male  Medical Record #: [id number]  Today's Date: 10/10/2023     Precautions  Precautions: Fall Risk    Assessment    Pt was seen for OT treatment . Attempted to see pt for OT treatment this AM and pt stated he was eating, \"Come back later\". Did educate pt is the difference between OT and PT and tx. Second attempt , pt agreeable to attempt EOB self care tasks. Pt required Min A with HOB elevated and use of trapeze bar for supine to sit at EOB with extended time. Pt demo increased sitting balance and able to scoot forward with extended time as well. Min A for UB dressing changes and Mod A using AE for LB dressing to don  socks with sock aid and undergarment with reacher able to bring undergarment up to thighs but attempted to stand for clothing management up over hips and unable to fully stand up straight to clear buttocks up off bed stating, \"My knees are too weak; \" I can't fully stand\". Pt attempted X3 for full STS, unable to complete. H/G tasks done seated EOB with set up. Pt required Min A for sit to supine using trapeze bar and able to bend Both legs and push up in bed via bridging. RN in to replace condom cath which was accidentally removed with LB dressing. Pt was left with RN in room and RN  was updated on OT treatment findings and recommendations. Pt gave good effort. Will continue to follow.       Plan    Treatment Plan Status: (P) Continue Current Treatment Plan  Type of Treatment: Self Care / Activities of Daily Living, Therapeutic Exercises, Therapeutic Activity, Adaptive Equipment  Treatment Frequency: 3 Times per Week  Treatment Duration: Until Therapy Goals Met    DC Equipment Recommendations: (P) Unable to determine at this time  Discharge Recommendations: (P) Recommend post-acute placement for additional occupational therapy services prior to discharge home    Subjective    \"I have a hard time standing " "but I'll try\".      Objective       10/10/23 1139   Cognition    Cognition / Consciousness WDL   Level of Consciousness Alert   Comments Pleasant and cooperative and motivated in therapy.   Passive ROM Upper Body   Passive ROM Upper Body WDL   Comments WFL   Active ROM Upper Body   Active ROM Upper Body  WDL   Dominant Hand Right   Comments WFL   Strength Upper Body   Upper Body Strength  X   Gross Strength Generalized Weakness, Equal Bilaterally.    Comments easily fatigues   Other Treatments   Other Treatments Provided Psychosocial intervention addressed   Balance   Sitting Balance (Static) Fair   Sitting Balance (Dynamic) Fair -   Standing Balance (Static) Fair -  (partial standing torey--  not fully able to  stand up straight due to \"weak knees\".)   Standing Balance (Dynamic) Poor +  (partial standing.)   Weight Shift Sitting Fair   Weight Shift Standing Poor   Comments with FWW partial standing   Bed Mobility    Supine to Sit Minimal Assist   Sit to Supine Minimal Assist   Scooting Minimal Assist   Comments using trapeze bar and HOB elevated.   Activities of Daily Living   Eating Supervision   Grooming Supervision;Seated   Bathing   (declined)   Upper Body Dressing Minimal Assist   Lower Body Dressing Moderate Assist  (using AE seated EOB. Unable to fully stand for clothing management up over hips.)   Toileting Maximal Assist  (condom cath in place.)   Skilled Intervention Verbal Cuing;Tactile Cuing;Sequencing;Postural Facilitation;Compensatory Strategies   Comments Pt will need assist for most self care tasks at this time and all ADL transfers.   Functional Mobility   Sit to Stand Maximal Assist  (2 person assist and unable to fully stand due to weak knees and unable to clear buttocks from bed.)   Bed, Chair, Wheelchair Transfer Unable to Participate   Toilet Transfers   (declined the need to use BSC)   Mobility EOB and STS X3   Comments with FWW, 2 person assist and bed raised up from floor.   Activity " Tolerance   Comments limited by weakness and pain.   Patient / Family Goals   Patient / Family Goal #1 home ASAP   Goal #1 Outcome Goal not met   Short Term Goals   Short Term Goal # 1 pt will complete functional transfers at SPV level   Goal Outcome # 1 Goal not met   Short Term Goal # 2 pt will complete toileting ADL and hygiene at SPV level   Goal Outcome # 2 Goal not met   Short Term Goal # 3 pt will demo LB dress with AE PRN at SPV level   Goal Outcome # 3 Progressing as expected   Occupational Therapy Treatment Plan    O.T. Treatment Plan Continue Current Treatment Plan   Anticipated Discharge Equipment and Recommendations   DC Equipment Recommendations Unable to determine at this time   Discharge Recommendations Recommend post-acute placement for additional occupational therapy services prior to discharge home   Interdisciplinary Plan of Care Collaboration   IDT Collaboration with  Nursing;Certified Nursing Assistant   Collaboration Comments RN updated

## 2023-10-10 NOTE — PROGRESS NOTES
Hospital Medicine Daily Progress Note    Date of Service  10/10/2023    Chief Complaint  Blair Celeste is a 68 y.o. male admitted 10/3/2023 with shortness of breath    Hospital Course    68-year-old male with history of alcohol abuse, gout, smoking, diabetes, hypertension, obesity with COPD who presented 10/4 with worsening shortness of breath.  On admission CTA was negative for PE however showed large left lung infiltration.  Initially patient was admitted to the ICU for BiPAP and antibiotics.  Patient was improved and transferred out of the ICU on 10/5.  Echo was done and showed ejection fraction 70% with right ventricle was dilation and reduced right ventricle systolic function.    During the hospitalization developed bilateral knee pain especially on the right side with the swelling, x-rayed did not show any fracture however showed effusion and patient underwent arthrocentesis by orthopedic from his bilateral knees with improving on his pain, fluid showed white blood cell around 10,000 with polys 96% and Monosodium urate was seen rarely.  Colchicine was initiated.    Patient was found uncontrolled hypertension, patient is on metoprolol  mg daily, amlodipine and olmesartan were added.    Patient developed delirium and altered mental status, MRI for brain did not show any acute finding and ammonia was 38.  His delirium was improved    CT scan for his chest showed masslike consolidation in the right lower lobe, patient is to follow-up CT scan in couple months after discharge.      Interval Problem Update  -Evaluated examined the patient at bedside, patient is improving and he is on room air, denied any pain, improving on his knees pain and swelling.  -Joint fluid was reviewed and likely gout, start with colchicine.  -PT and OT recommended SNF.  -Continue IV Lasix 40 daily.  -Case was discussed with his wife and answered all her questions.      I have discussed this patient's plan of care and discharge  plan at IDT rounds today with Case Management, Nursing, Nursing leadership, and other members of the IDT team.    Consultants/Specialty      Code Status  Full Code    Disposition  The patient is not medically cleared for discharge to home or a post-acute facility.  Anticipate discharge to: skilled nursing facility  Patient will be medically cleared for placement around1-2 days  I have placed the appropriate orders for post-discharge needs.    Review of Systems  All 12 systems were reviewed and negative except as mentioned above       Physical Exam  Temp:  [36.2 °C (97.2 °F)-37.1 °C (98.8 °F)] 37.1 °C (98.8 °F)  Pulse:  [70-88] 84  Resp:  [15-18] 17  BP: (105-136)/(65-80) 129/80  SpO2:  [91 %-95 %] 93 %    Physical Exam  Constitutional:       Appearance: He is not ill-appearing.   HENT:      Head: Normocephalic.      Mouth/Throat:      Mouth: Mucous membranes are moist.   Eyes:      Extraocular Movements: Extraocular movements intact.      Conjunctiva/sclera: Conjunctivae normal.   Cardiovascular:      Rate and Rhythm: Normal rate and regular rhythm.      Pulses: Normal pulses.   Pulmonary:      Effort: Pulmonary effort is normal.      Comments: Decrease breathing sounds  Abdominal:      General: Bowel sounds are normal. There is distension.      Palpations: Abdomen is soft.      Tenderness: There is no abdominal tenderness.   Musculoskeletal:         General: Swelling and tenderness present. No deformity.      Cervical back: Normal range of motion and neck supple.   Skin:     General: Skin is warm.   Neurological:      Mental Status: He is alert. He is disoriented.      Comments: Drowsy, AO times 2-3   Psychiatric:         Mood and Affect: Mood normal.         Fluids    Intake/Output Summary (Last 24 hours) at 10/10/2023 1440  Last data filed at 10/10/2023 1100  Gross per 24 hour   Intake 400 ml   Output 4300 ml   Net -3900 ml       Laboratory  Recent Labs     10/08/23  0723 10/09/23  0350 10/10/23  0209   WBC 8.1  6.8 5.3   RBC 5.85 5.73 5.69   HEMOGLOBIN 17.5 17.2 16.8   HEMATOCRIT 55.5* 55.2* 55.6*   MCV 94.9 96.3 97.7   MCH 29.9 30.0 29.5   MCHC 31.5* 31.2* 30.2*   RDW 56.7* 57.0* 56.7*   PLATELETCT 327 338 326   MPV 9.5 9.2 9.3     Recent Labs     10/08/23  0723 10/09/23  0025 10/09/23  1400 10/10/23  0209   SODIUM 136 135  --  139   POTASSIUM 5.0 5.6* 5.1 5.0   CHLORIDE 93* 97  --  96   CO2 31 29  --  37*   GLUCOSE 129* 144*  --  133*   BUN 54* 50*  --  32*   CREATININE 1.52* 1.09  --  0.71   CALCIUM 9.2 9.4  --  9.4                       Imaging  DX-KNEE 2- RIGHT   Final Result         1. No acute osseous abnormality.      DX-KNEE 2- LEFT   Final Result      1.  No evidence of acute fracture or dislocation.      2.  Mild tricompartment degenerative change.      3.  Joint effusion.      US-EXTREMITY VENOUS LOWER BILAT   Final Result      MR-BRAIN-W/O   Final Result      1.  Markedly motion degraded exam.   2.  No gross acute intracranial abnormality.   3.  Mild white matter disease representing microvascular ischemic changes.      US-RENAL   Final Result      1.  Left renal cysts.      2.  No hydronephrosis. No renal calculus.      US-ABDOMEN LTD (SOFT TISSUE)   Final Result      No ascites is visualized.      DX-CHEST-PORTABLE (1 VIEW)   Final Result         1.  Linear densities in the right midlung and left lung base suggesting atelectasis or infiltrate.   2.  Cardiomegaly      EC-ECHOCARDIOGRAM COMPLETE W/O CONT   Final Result      CT-CTA NECK WITH & W/O-POST PROCESSING   Final Result      Atherosclerosis of the carotid bifurcations and bulbs without hemodynamically significant stenosis.      Patent vertebral arteries.      CT-CTA HEAD WITH & W/O-POST PROCESS   Final Result      No large vessel occlusion or hemodynamically significant stenosis or aneurysm.      CT-CEREBRAL PERFUSION ANALYSIS   Final Result      1.  Small focus of potentially reversible ischemia in the left parietal lobe.      CT-HEAD W/O   Final  Result      No acute intracranial abnormality.                  CT-CTA CHEST PULMONARY ARTERY W/ RECONS   Final Result         1.  No large central pulmonary embolus is appreciated, evaluation of the subsegmental branches is essentially nondiagnostic due to motion artifacts. Additional imaging would be required for definitive exclusion of small distal pulmonary emboli.   2.  Masslike consolidation in the right lower lobe, could represent round atelectasis or infiltrate however appearance is concerning for pulmonary mass. Should be considered neoplastic less frequent otherwise.   3.  Hazy right lower lobe opacities suggests component of superimposed infiltrate.   4.  Trace right pleural effusion.   5.  Mediastinal and right hilar lymph nodes, appearance concerning for possible metastatic disease given pulmonary finding.   6.  Fluid in the left upper quadrant adjacent to the spleen, density most typical of ascites   7.  Irregular hepatic contour compatible with changes of cirrhosis.   8.  Atherosclerosis and atherosclerotic coronary artery disease.   9.  Pulmonary nodule, see nodule follow-up recommendations below.      Fleischner Society pulmonary nodule recommendations:   Low and High Risk: Consider CT at 3 months, PET/CT, or tissue sampling.      Low Risk - Minimal or absent history of smoking and of other known risk factors.      High Risk - History of smoking or of other known risk factors.      Note: These recommendations do not apply to lung cancer screening, patients with immunosuppression, or patients with known primary cancer.      Fleischner Society 2017 Guidelines for Management of Incidentally Detected Pulmonary Nodules in Adults              Assessment/Plan  * Acute on chronic respiratory failure with hypoxia and hypercapnia (HCC)- (present on admission)  Assessment & Plan  In setting of untreated RAYMOND and pneumonia with pulmonary hypertension  Improving, decrease oxygen requirement  Lasix as needed,  finish course of antibiotics    Gout flare  Assessment & Plan  Likely gout flare  Fluid showed around 10,000 white blood cell, probably, rare monosodium urate  Colchicine was started  Less likely to be infection    Pain in both knees  Assessment & Plan  Ordered x-ray  Pain management    Acute cystitis  Assessment & Plan  Reported the dysuria -UA positive for WBC, urine culture pending,      ACP (advance care planning)  Assessment & Plan  Patient is drowsy and confused. I discussed advance care planning with the the wife at bedside, including diagnosis, prognosis, plan of care, risks and benefits of any therapies that could be offered.   We discussed regarding CODE STATUS, CPR and intubation with mechanical ventilation, discussed regarding risk and benefits. The wife is willing to  have all life sustaining efforts. Continue full code.  ACP: 16min    Nonsustained ventricular tachycardia (HCC)  Assessment & Plan  19 beats of VTACH. Pt asymptomatic    10/9 V. tach 6 seconds  Optimize electrolytes, potassium over 4 and mag over 2  Continue telemetry monitor  Continue metoprolol    JOSLYN (acute kidney injury) (HCC)  Assessment & Plan  New JOSLYN, Cre 0.8> 1.84,   Likely due to overdiuresis and IV contrast    hold diuretics and ARBs    10/7 Worsening JOSLYN, Cre 0.8> 1.84>2, hold diuretics and ARBs.   I discussed with the nephrology, continue to hold Lasix, ordered urine analysis    10/9 resolved. May resume Lasix, ARBs on hold  Avoid nephrotoxic agent    Obstructive sleep apnea  Assessment & Plan  CPAP while in house  See if we can arrange for home    Type 2 diabetes mellitus (HCC)  Assessment & Plan  A1c 8.5   On dapagliflozin as outpt  Covering with SSI while in house  Add glargine if needed    Cirrhosis (HCC)  Assessment & Plan  Likely ETOH related, history of alcoholism  Component of fatty liver certainly possible  Stable with no decompensation  Follow-up with PCP and hepatologist as outpatient    Alcohol abuse  Assessment &  Plan  Reports his last drink was 2 wks ago  Watch for signs of withdrawal    Acute metabolic encephalopathy  Assessment & Plan  MRI negative for acute pathology, it did show mild microvascular ischemic changes  History of EtOH cirrhosis.  Ammonia 38  Improving, still has episodes of confusion    Acute on chronic right-sided heart failure (HCC)  Assessment & Plan  TTE showing LVEF 75%, calcified aortic valve uable to asses degree of stenosis, decreased R heart systolic function  Continue beta-blockers and losartan  Lasix as needed      Pneumonia  Assessment & Plan  Continue iv unasyn/azithro  Aspiration pna? I have asked speech to see patient.       Abnormal CT of the chest  Assessment & Plan  cta chest reviewed  Pulm evaluated the patient and needs to follow-up with CT scan in couple weeks after discharge.        Primary hypertension  Assessment & Plan  -Furosemide 40 mg IV BID (home dose 40 mg PO daily)  -Continue home metoprolol tartrate 100 mg daily  -amlodipine added on admission  -add olmesartan 20mg  -Continue monitoring and adjust the medication if needed         VTE prophylaxis: Lovenox    I have performed a physical exam and reviewed and updated ROS and Plan today (10/10/2023). In review of yesterday's note (10/9/2023), there are no changes except as documented above.    Greater than 51 minutes spent prepping to see patient (e.g. review of tests) obtaining and/or reviewing separately obtained history. Performing a medically appropriate examination and/ evaluation.  Counseling and educating the patient/family/caregiver.  Ordering medications, tests, or procedures.  Referring and communicating with other health care professionals.  Documenting clinical information in EPIC.  Independently interpreting results and communicating results to patient/family/caregiver.  Care coordination

## 2023-10-10 NOTE — PROGRESS NOTES
Monitor Summary:  Rhythm: SR Rate: 75-80  Ectopies: PAC, PVC, mf couplet, bigeminy  Measurement: .20/.09/.36

## 2023-10-10 NOTE — CARE PLAN
Problem: Fall Risk  Goal: Patient will remain free from falls  Outcome: Progressing  Note: Treaded socks and bed/strip alarm on, side rails up x 4 Bariatric bed. Call light within reach. Pt educated to call for assistance. Reinforce as needed. Continue to monitor.         Problem: Knowledge Deficit - Standard  Goal: Patient and family/care givers will demonstrate understanding of plan of care, disease process/condition, diagnostic tests and medications  Outcome: Progressing     Problem: Respiratory  Goal: Patient will achieve/maintain optimum respiratory ventilation and gas exchange  Outcome: Progressing  Note: Pt has been titrated from 5L NC to 2L NC     Problem: Skin Integrity  Goal: Skin integrity is maintained or improved  Outcome: Progressing  Note: Assess skin and monitor for skin breakdown. Alleviate pressure to bony prominences and provide assistance with turning, repositioning, ROM and mobility as appropriate. Use of barrier cream as needed. Continue to monitor.      The patient is Watcher - Medium risk of patient condition declining or worsening    Shift Goals  Clinical Goals: titrate oxygen, increase mobilization  Patient Goals: rest  Family Goals: not present    Progress made toward(s) clinical / shift goals:  Pt worked with OT and got EOB. Sánchez removed, voiding adequately.     Patient is not progressing towards the following goals: Cannot stand

## 2023-10-10 NOTE — DISCHARGE PLANNING
Case Management Discharge Planning    Admission Date: 10/3/2023  GMLOS: 3.9  ALOS: 6    6-Clicks ADL Score: 15  6-Clicks Mobility Score: 7  PT and/or OT Eval ordered: Yes  Post-acute Referrals Ordered: Yes  Post-acute Choice Obtained: No  Has referral(s) been sent to post-acute provider:  Yes      Anticipated Discharge Dispo: Discharge Disposition: D/T to SNF with Medicare cert in anticipation of skilled care (03)    DME Needed: No    Action(s) Taken: Referral(s) sent and OTHER    Escalations Completed: None    Medically Clear: No    Next Steps: Follow up with accepting SNF's. Follow up with riddhi from [facility] to inform her that after discussion in IDT rounds that pt will only need pt/ot therapy.      Barriers to Discharge: Medical clearance    Is the patient up for discharge tomorrow: No    **LMSW called riddhi from [facility] to update her on what pt will need when he is [facility]. LMSW left voicemail.

## 2023-10-10 NOTE — PROGRESS NOTES
Pt transported back to unit with PACU RN. Pt abdomen distended and firm, rectal tube in place for decompression. Pt connected to tele monitor, monitor room notified. Post op vitals initiated. Pt updated on plan of care, strict NPO until procedure tomorrow. All safety precautions in place, bed alarm activated.

## 2023-10-10 NOTE — DISCHARGE PLANNING
LMSW spoke with Eden ([phone] ext [phone]) from [facility] and informed LMSW that they are considering take the pt only if the pt is seeking PT/OT therapy. If pt is seeking more treatment they can not take him. Eden stated if pt is going to need wound treatment due to his knee to let her know.

## 2023-10-10 NOTE — CARE PLAN
Problem: Ventilation  Goal: Ability to achieve and maintain unassisted ventilation or tolerate decreased levels of ventilator support  Description: Target End Date:  4 days     Document on Vent flowsheet    1.  Support and monitor invasive and noninvasive mechanical ventilation  2.  Monitor ventilator weaning response  3.  Perform ventilator associated pneumonia prevention interventions  4.  Manage ventilation therapy by monitoring diagnostic test results  Outcome: Not Met   20/460/12/70

## 2023-10-10 NOTE — RESPIRATORY CARE
COPD EDUCATION by COPD CLINICAL EDUCATOR  10/10/2023 at 9:23 AM by Mary Carmen Everett RRT     Patient reviewed by COPD education team. Patient does not have a history or diagnosis of COPD and is a non-smoker.  Therefore, patient does not qualify for the COPD program.    Bedside and Verbal shift change report given to Lawrence Rushing RN (oncoming nurse) by Sindi Mcgrath RN (offgoing nurse). Report included the following information SBAR, Kardex, OR Summary, Procedure Summary, Intake/Output, MAR and Recent Results. 2000 Primary Nurse Jorge Kyle RN and Hearn RN performed a dual skin assessment on this patient No impairment noted  Gianfranco score is 15. Pt has abdominal incision post op day 1. Pt has bruise on right knee. ALYSSA brown drainage. 2000 Pt assisted back to bed with assistance of RN and turn team.  Pt AAO times 4.   2100 Pt urine output less than 30cc/hr and BP MAP less than 65. Paged Dr. Suzen Duverney. Per MD restart verona at 10 mcg/kg/min titrate as needed and give patient 1000 cc bolus of 1/2 NS and then continue 0.45 NS with goody bag. Will continue to monitor. 0300 Pt pulled out NG tube and attempting to pull out ALYSSA and central line. Paged surgeon Dr. Yamilet Conway. Updated them on patient using SBAR format. Per Dr. Yamilet Conway leave farmer out and wrist restraints ordered. Restraints placed on patient. 0630 Spoke with Dr. Yamilet Conway about pt pulling out central line. Passed on to day shift nurse MD wants it reinserted. Day shift nurse will pass on to pulmonology for reinsertion.

## 2023-10-11 ENCOUNTER — APPOINTMENT (OUTPATIENT)
Dept: RADIOLOGY | Facility: MEDICAL CENTER | Age: 68
DRG: 291 | End: 2023-10-11
Attending: INTERNAL MEDICINE
Payer: MEDICARE

## 2023-10-11 ENCOUNTER — PATIENT OUTREACH (OUTPATIENT)
Dept: SCHEDULING | Facility: IMAGING CENTER | Age: 68
End: 2023-10-11
Payer: MEDICARE

## 2023-10-11 VITALS
TEMPERATURE: 98.6 F | DIASTOLIC BLOOD PRESSURE: 73 MMHG | WEIGHT: 315 LBS | OXYGEN SATURATION: 90 % | SYSTOLIC BLOOD PRESSURE: 100 MMHG | BODY MASS INDEX: 41.75 KG/M2 | HEART RATE: 80 BPM | RESPIRATION RATE: 17 BRPM | HEIGHT: 73 IN

## 2023-10-11 PROBLEM — R79.89 ELEVATED TSH: Status: ACTIVE | Noted: 2023-10-11

## 2023-10-11 LAB
ANION GAP SERPL CALC-SCNC: 8 MMOL/L (ref 7–16)
BASOPHILS # BLD AUTO: 0.8 % (ref 0–1.8)
BASOPHILS # BLD: 0.05 K/UL (ref 0–0.12)
BUN SERPL-MCNC: 26 MG/DL (ref 8–22)
CALCIUM SERPL-MCNC: 9.8 MG/DL (ref 8.5–10.5)
CHLORIDE SERPL-SCNC: 94 MMOL/L (ref 96–112)
CO2 SERPL-SCNC: 37 MMOL/L (ref 20–33)
CREAT SERPL-MCNC: 0.64 MG/DL (ref 0.5–1.4)
CRP SERPL HS-MCNC: 3.4 MG/DL (ref 0–0.75)
EOSINOPHIL # BLD AUTO: 0.21 K/UL (ref 0–0.51)
EOSINOPHIL NFR BLD: 3.5 % (ref 0–6.9)
ERYTHROCYTE [DISTWIDTH] IN BLOOD BY AUTOMATED COUNT: 55.1 FL (ref 35.9–50)
ERYTHROCYTE [SEDIMENTATION RATE] IN BLOOD BY WESTERGREN METHOD: 3 MM/HOUR (ref 0–20)
GFR SERPLBLD CREATININE-BSD FMLA CKD-EPI: 104 ML/MIN/1.73 M 2
GLUCOSE BLD STRIP.AUTO-MCNC: 160 MG/DL (ref 65–99)
GLUCOSE BLD STRIP.AUTO-MCNC: 164 MG/DL (ref 65–99)
GLUCOSE SERPL-MCNC: 165 MG/DL (ref 65–99)
HCT VFR BLD AUTO: 57.3 % (ref 42–52)
HGB BLD-MCNC: 18.1 G/DL (ref 14–18)
IMM GRANULOCYTES # BLD AUTO: 0.02 K/UL (ref 0–0.11)
IMM GRANULOCYTES NFR BLD AUTO: 0.3 % (ref 0–0.9)
LYMPHOCYTES # BLD AUTO: 1.12 K/UL (ref 1–4.8)
LYMPHOCYTES NFR BLD: 18.7 % (ref 22–41)
MAGNESIUM SERPL-MCNC: 1.4 MG/DL (ref 1.5–2.5)
MCH RBC QN AUTO: 30.3 PG (ref 27–33)
MCHC RBC AUTO-ENTMCNC: 31.6 G/DL (ref 32.3–36.5)
MCV RBC AUTO: 96 FL (ref 81.4–97.8)
MONOCYTES # BLD AUTO: 1 K/UL (ref 0–0.85)
MONOCYTES NFR BLD AUTO: 16.7 % (ref 0–13.4)
NEUTROPHILS # BLD AUTO: 3.59 K/UL (ref 1.82–7.42)
NEUTROPHILS NFR BLD: 60 % (ref 44–72)
NRBC # BLD AUTO: 0 K/UL
NRBC BLD-RTO: 0 /100 WBC (ref 0–0.2)
PHOSPHATE SERPL-MCNC: 3.8 MG/DL (ref 2.5–4.5)
PLATELET # BLD AUTO: 328 K/UL (ref 164–446)
PMV BLD AUTO: 9.7 FL (ref 9–12.9)
POTASSIUM SERPL-SCNC: 4.4 MMOL/L (ref 3.6–5.5)
PROCALCITONIN SERPL-MCNC: 0.13 NG/ML
RBC # BLD AUTO: 5.97 M/UL (ref 4.7–6.1)
SODIUM SERPL-SCNC: 139 MMOL/L (ref 135–145)
T4 FREE SERPL-MCNC: 1.18 NG/DL (ref 0.93–1.7)
TSH SERPL DL<=0.005 MIU/L-ACNC: 9.42 UIU/ML (ref 0.38–5.33)
WBC # BLD AUTO: 6 K/UL (ref 4.8–10.8)

## 2023-10-11 PROCEDURE — 85652 RBC SED RATE AUTOMATED: CPT

## 2023-10-11 PROCEDURE — 99239 HOSP IP/OBS DSCHRG MGMT >30: CPT | Performed by: INTERNAL MEDICINE

## 2023-10-11 PROCEDURE — A9270 NON-COVERED ITEM OR SERVICE: HCPCS | Performed by: INTERNAL MEDICINE

## 2023-10-11 PROCEDURE — 84145 PROCALCITONIN (PCT): CPT

## 2023-10-11 PROCEDURE — 36415 COLL VENOUS BLD VENIPUNCTURE: CPT

## 2023-10-11 PROCEDURE — 700102 HCHG RX REV CODE 250 W/ 637 OVERRIDE(OP): Performed by: STUDENT IN AN ORGANIZED HEALTH CARE EDUCATION/TRAINING PROGRAM

## 2023-10-11 PROCEDURE — 700102 HCHG RX REV CODE 250 W/ 637 OVERRIDE(OP): Performed by: INTERNAL MEDICINE

## 2023-10-11 PROCEDURE — A9270 NON-COVERED ITEM OR SERVICE: HCPCS | Performed by: HOSPITALIST

## 2023-10-11 PROCEDURE — A9270 NON-COVERED ITEM OR SERVICE: HCPCS

## 2023-10-11 PROCEDURE — 700111 HCHG RX REV CODE 636 W/ 250 OVERRIDE (IP): Mod: JZ | Performed by: INTERNAL MEDICINE

## 2023-10-11 PROCEDURE — 85025 COMPLETE CBC W/AUTO DIFF WBC: CPT

## 2023-10-11 PROCEDURE — 84100 ASSAY OF PHOSPHORUS: CPT

## 2023-10-11 PROCEDURE — 700111 HCHG RX REV CODE 636 W/ 250 OVERRIDE (IP)

## 2023-10-11 PROCEDURE — 700102 HCHG RX REV CODE 250 W/ 637 OVERRIDE(OP)

## 2023-10-11 PROCEDURE — A9270 NON-COVERED ITEM OR SERVICE: HCPCS | Performed by: STUDENT IN AN ORGANIZED HEALTH CARE EDUCATION/TRAINING PROGRAM

## 2023-10-11 PROCEDURE — 700102 HCHG RX REV CODE 250 W/ 637 OVERRIDE(OP): Performed by: HOSPITALIST

## 2023-10-11 PROCEDURE — 84443 ASSAY THYROID STIM HORMONE: CPT

## 2023-10-11 PROCEDURE — 700111 HCHG RX REV CODE 636 W/ 250 OVERRIDE (IP): Mod: JZ | Performed by: HOSPITALIST

## 2023-10-11 PROCEDURE — 86140 C-REACTIVE PROTEIN: CPT

## 2023-10-11 PROCEDURE — 80048 BASIC METABOLIC PNL TOTAL CA: CPT

## 2023-10-11 PROCEDURE — 83735 ASSAY OF MAGNESIUM: CPT

## 2023-10-11 PROCEDURE — 71045 X-RAY EXAM CHEST 1 VIEW: CPT

## 2023-10-11 PROCEDURE — 84439 ASSAY OF FREE THYROXINE: CPT

## 2023-10-11 PROCEDURE — 82962 GLUCOSE BLOOD TEST: CPT | Mod: 91

## 2023-10-11 PROCEDURE — 92526 ORAL FUNCTION THERAPY: CPT

## 2023-10-11 RX ORDER — AMLODIPINE BESYLATE 10 MG/1
10 TABLET ORAL DAILY
Qty: 30 TABLET | Refills: 1 | Status: SHIPPED | OUTPATIENT
Start: 2023-10-12

## 2023-10-11 RX ORDER — LANOLIN ALCOHOL/MO/W.PET/CERES
100 CREAM (GRAM) TOPICAL DAILY
Qty: 30 TABLET | Refills: 1 | Status: SHIPPED | OUTPATIENT
Start: 2023-10-12

## 2023-10-11 RX ORDER — COLCHICINE 0.6 MG/1
0.6 TABLET ORAL DAILY
Qty: 49 TABLET | Refills: 0 | Status: SHIPPED | OUTPATIENT
Start: 2023-10-12 | End: 2023-11-30

## 2023-10-11 RX ORDER — METOPROLOL SUCCINATE 100 MG/1
100 TABLET, EXTENDED RELEASE ORAL DAILY
Qty: 30 TABLET | Refills: 1 | Status: SHIPPED | OUTPATIENT
Start: 2023-10-12

## 2023-10-11 RX ORDER — TAMSULOSIN HYDROCHLORIDE 0.4 MG/1
0.4 CAPSULE ORAL
Qty: 30 CAPSULE | Refills: 1 | Status: SHIPPED | OUTPATIENT
Start: 2023-10-11

## 2023-10-11 RX ORDER — MAGNESIUM SULFATE HEPTAHYDRATE 40 MG/ML
4 INJECTION, SOLUTION INTRAVENOUS ONCE
Status: COMPLETED | OUTPATIENT
Start: 2023-10-11 | End: 2023-10-11

## 2023-10-11 RX ORDER — OLMESARTAN MEDOXOMIL 20 MG/1
20 TABLET ORAL DAILY
Qty: 30 TABLET | Refills: 1 | Status: SHIPPED | OUTPATIENT
Start: 2023-10-12

## 2023-10-11 RX ADMIN — INSULIN HUMAN 2 UNITS: 100 INJECTION, SOLUTION PARENTERAL at 10:05

## 2023-10-11 RX ADMIN — COLCHICINE 0.6 MG: 0.6 TABLET, FILM COATED ORAL at 05:27

## 2023-10-11 RX ADMIN — ENOXAPARIN SODIUM 40 MG: 100 INJECTION SUBCUTANEOUS at 05:27

## 2023-10-11 RX ADMIN — FUROSEMIDE 40 MG: 10 INJECTION INTRAMUSCULAR; INTRAVENOUS at 05:27

## 2023-10-11 RX ADMIN — OLMESARTAN MEDOXOMIL 20 MG: 20 TABLET, FILM COATED ORAL at 05:26

## 2023-10-11 RX ADMIN — TAMSULOSIN HYDROCHLORIDE 0.4 MG: 0.4 CAPSULE ORAL at 10:07

## 2023-10-11 RX ADMIN — Medication 100 MG: at 05:26

## 2023-10-11 RX ADMIN — AMLODIPINE BESYLATE 10 MG: 10 TABLET ORAL at 05:27

## 2023-10-11 RX ADMIN — METOPROLOL SUCCINATE 100 MG: 100 TABLET, EXTENDED RELEASE ORAL at 05:26

## 2023-10-11 RX ADMIN — INSULIN HUMAN 2 UNITS: 100 INJECTION, SOLUTION PARENTERAL at 14:55

## 2023-10-11 RX ADMIN — DOCUSATE SODIUM 50 MG AND SENNOSIDES 8.6 MG 2 TABLET: 8.6; 5 TABLET, FILM COATED ORAL at 05:27

## 2023-10-11 RX ADMIN — MAGNESIUM SULFATE HEPTAHYDRATE 4 G: 4 INJECTION, SOLUTION INTRAVENOUS at 03:02

## 2023-10-11 ASSESSMENT — PAIN DESCRIPTION - PAIN TYPE: TYPE: ACUTE PAIN

## 2023-10-11 ASSESSMENT — FIBROSIS 4 INDEX: FIB4 SCORE: 1.24

## 2023-10-11 NOTE — DISCHARGE INSTRUCTIONS
HF Patient Discharge Instructions  Monitor your weight daily, and maintain a weight chart, to track your weight changes.   Activity as tolerated, unless your Doctor has ordered otherwise.  Follow a low fat, low cholesterol, low salt diet unless instructed otherwise by your Doctor. Read the labels on the back of food products and track your intake of fat, cholesterol and salt.   Fluid Restriction Yes. If a Fluid Restriction has been ordered by your Doctor, measure fluids with a measuring cup to ensure that you are not exceeding the restriction.   No smoking.  Oxygen Yes. If your Doctor has ordered that you wear Oxygen at home, it is important to wear it as ordered.  Did you receive an explanation from staff on the importance of taking each of your medications and why it is necessary to keep taking them unless your doctor says to stop? Yes  Were all of your questions answered about how to manage your heart failure and what to do if you have increased signs and symptoms after you go home? Yes  Do you feel like your heart failure care team involved you in the care treatment plan and allowed you to make decisions regarding your care while in the hospital and addressed any discharge needs you might have? Yes    See the educational handout provided at discharge for more information on monitoring your daily weight, activity and diet. This also explains more about Heart Failure, symptoms of a flare-up and some of the tests that you have undergone.     Warning Signs of a Flare-Up include:  Swelling in the ankles or lower legs.  Shortness of breath, while at rest, or while doing normal activities.   Shortness of breath at night when in bed, or coughing in bed.   Requiring more pillows to sleep at night, or needing to sit up at night to sleep.  Feeling weak, dizzy or fatigued.     When to call your Doctor:  Call [facility] seven days a week from 8:00 a.m. to 8:00 p.m. for medical questions [phone].  Call  your Primary Care Physician or Cardiologist if:   You experience any pain radiating to your jaw or neck.  You have any difficulty breathing.  You experience weight gain of 3 lbs in a day or 5 lbs in a week.   You feel any palpitations or irregular heartbeats.  You become dizzy or lose consciousness.   If you have had an angiogram or had a pacemaker or AICD placed, and experience:  Bleeding, drainage or swelling at the surgical / puncture site.  Fever greater than 100.0 F  Shock from internal defibrillator.  Cool and / or numb extremities.     Please access the [facility] My HF Guide/Heart Failure Interactive Workbook:   [email or web address]

## 2023-10-11 NOTE — DISCHARGE SUMMARY
Discharge Summary    CHIEF COMPLAINT ON ADMISSION  Chief Complaint   Patient presents with    Shortness of Breath       Reason for Admission  Acute respiratory failure due to pneumonia and obstructive sleep apnea  Possible lung mass    Admission Date  10/3/2023    CODE STATUS  Full Code    HPI & HOSPITAL COURSE    68-year-old male with history of alcohol abuse, gout, smoking, diabetes, alcoholic cirrhosis ??,  Hypertension, obesity with COPD who presented 10/4 with worsening shortness of breath.  On admission CTA was negative for PE however showed large left lung infiltration.  Initially patient was admitted to the ICU for BiPAP and antibiotics.  Patient was improved and transferred out of the ICU on 10/5.  Echo was done and showed ejection fraction 70% with right ventricle was dilation and reduced right ventricle systolic function.  Patient received IV Lasix with antibiotics with improving on oxygen requirement and patient is on 3 L nasal cannula, patient needs sleep study as outpatient and possible CPAP at night.  Patient will be discharged with oxygen.     During the hospitalization developed bilateral knee pain especially on the right side with the swelling, x-rayed did not show any fracture however showed effusion and patient underwent arthrocentesis by orthopedic from his bilateral knees with improving on his pain, fluid showed white blood cell around 10,000 with polys 96% and Monosodium urate was seen rarely.  Colchicine was initiated with improving on his symptoms.     Patient was found uncontrolled hypertension, patient is on metoprolol  mg daily, amlodipine and olmesartan were added.     Patient developed delirium and altered mental status, MRI for brain did not show any acute finding and ammonia was 38.  His delirium was improved and patient is alert oriented x4.  Patient needs sleep study as outpatient.     CT scan for his chest showed masslike consolidation in the right lower lobe, patient is to  follow-up CT scan in couple months after discharge.    On the day of discharge the patient is alert oriented x4, patient is on 3 L nasal cannula, denied any symptoms, improving on his breathing.  Improving on his pain on his knees.  Patient feels stable for discharge to SNF.    Therefore, he is discharged in good and stable condition to skilled nursing facility.    The patient met 2-midnight criteria for an inpatient stay at the time of discharge.    Discharge Date  10/11/23      FOLLOW UP ITEMS POST DISCHARGE  Follow-up with PCP to repeat the scan for chest to rule out mass or cancer.  Follow-up with pulmonary clinic for pulmonary function test and sleep study    DISCHARGE DIAGNOSES  Principal Problem:    Acute on chronic respiratory failure with hypoxia and hypercapnia (HCC) (POA: Yes)  Active Problems:    Gout flare (POA: Unknown)    Primary hypertension (POA: Unknown)    Abnormal CT of the chest (POA: Unknown)    Pneumonia (POA: Unknown)    Acute on chronic right-sided heart failure (HCC) (POA: Unknown)    Acute metabolic encephalopathy (POA: Unknown)    Alcohol abuse (POA: Unknown)    Cirrhosis (HCC) (POA: Unknown)    Type 2 diabetes mellitus (HCC) (POA: Unknown)    Obstructive sleep apnea (POA: Unknown)    JOSLYN (acute kidney injury) (HCC) (POA: Unknown)    Nonsustained ventricular tachycardia (HCC) (POA: Unknown)    ACP (advance care planning) (POA: Unknown)    Acute cystitis (POA: Unknown)    Pain in both knees (POA: Unknown)    Elevated TSH (POA: Unknown)  Resolved Problems:    * No resolved hospital problems. *      FOLLOW UP  Future Appointments   Date Time Provider Department Center   10/18/2023  1:40 PM [facility]     No follow-up provider specified.    MEDICATIONS ON DISCHARGE     Medication List        START taking these medications        Instructions   amLODIPine 10 MG Tabs  Start taking on: October 12, 2023  Commonly known as: Norvasc   Take 1 Tablet by mouth every  day.  Dose: 10 mg     colchicine 0.6 MG Tabs  Start taking on: October 12, 2023  Commonly known as: Colcrys   Take 1 Tablet by mouth every day for 49 days.  Dose: 0.6 mg     metoprolol  MG Tb24  Start taking on: October 12, 2023  Commonly known as: Toprol XL   Take 1 Tablet by mouth every day.  Dose: 100 mg     olmesartan 20 MG Tabs  Start taking on: October 12, 2023  Commonly known as: Benicar   Take 1 Tablet by mouth every day.  Dose: 20 mg     tamsulosin 0.4 MG capsule  Commonly known as: Flomax   Take 1 Capsule by mouth 1/2 hour after breakfast.  Dose: 0.4 mg     thiamine 100 MG tablet  Start taking on: October 12, 2023  Commonly known as: Thiamine   Take 1 Tablet by mouth every day.  Dose: 100 mg            CONTINUE taking these medications        Instructions   allopurinol 100 MG Tabs  Commonly known as: Zyloprim   Take 100 mg by mouth every day.  Dose: 100 mg     Farxiga 10 MG Tabs  Generic drug: dapagliflozin propanediol   Take 10 mg by mouth every day.  Dose: 10 mg     furosemide 40 MG Tabs  Commonly known as: Lasix   Take 40 mg by mouth every day.  Dose: 40 mg            STOP taking these medications      metoprolol tartrate 100 MG Tabs  Commonly known as: Lopressor              Allergies  No Known Allergies    DIET  Orders Placed This Encounter   Procedures    Diet Order Diet: Cardiac (CHO); Second Modifier: (optional): 2 Gram Sodium; Fluid modifications: (optional): 2000 ml Fluid Restriction     Standing Status:   Standing     Number of Occurrences:   1     Order Specific Question:   Diet:     Answer:   Cardiac [6]     Comments:   CHO     Order Specific Question:   Second Modifier: (optional)     Answer:   2 Gram Sodium [7]     Order Specific Question:   Fluid modifications: (optional)     Answer:   2000 ml Fluid Restriction [11]       ACTIVITY  As tolerated.  Weight bearing as tolerated    CONSULTATIONS  Intensivist and neurologist     PROCEDURES  Arthrocentesis    LABORATORY  Lab Results    Component Value Date    SODIUM 139 10/11/2023    POTASSIUM 4.4 10/11/2023    CHLORIDE 94 (L) 10/11/2023    CO2 37 (H) 10/11/2023    GLUCOSE 165 (H) 10/11/2023    BUN 26 (H) 10/11/2023    CREATININE 0.64 10/11/2023        Lab Results   Component Value Date    WBC 6.0 10/11/2023    HEMOGLOBIN 18.1 (H) 10/11/2023    HEMATOCRIT 57.3 (H) 10/11/2023    PLATELETCT 328 10/11/2023        Total time of the discharge process exceeds 34 minutes.

## 2023-10-11 NOTE — PROGRESS NOTES
Monitor Summary:  Rhythm: SR w/ BBB Rate: 73-82  Ectopies: rare PAC, rare PVC, couplet  Measurement: .17/.11/.39

## 2023-10-11 NOTE — DISCHARGE PLANNING
LMSW called [facility] to clarify if they can accept pt today, LMSW left 2 VM's.     LMSW is in communication with [facility] to clarify if they can take him today. Due to transportation and acceptance to [facility] still pending.     LMSW spoke with Edmond from [facility] to clarify if pt really needs a trapeze. LMSW spoke with pt and he stated that he prefers to have one to help move himself. LMSW informed pt that she will call other facilities to see if they have available beds with trapeze.     LMSW called [facility] to see if they have trapeze and Addie said yes but will have to speak with her supervisor to see if the can accept him or not. LMSW waiting for a call back from [facility].

## 2023-10-11 NOTE — DISCHARGE PLANNING
DC Transport Scheduled    Received request at: 10/11/2023 at 1407    Transport Company Scheduled:  [employer]  Spoke with Jaci at [facility] to schedule transport.    Scheduled Date: 10/11/2023  Scheduled Time: 1615    Destination: [facility] at [address] [facility] [address]     Notified care team of scheduled transport via Vo[employer].     If there are any changes needed to the DC transportation scheduled, please contact [facility] at ext. [phone] between the hours of 0826-1295 Mon-Fri. If outside those hours, contact the ED Case Manager at ext. [phone].

## 2023-10-11 NOTE — CARE PLAN
The patient is Stable - Low risk of patient condition declining or worsening    Shift Goals  Clinical Goals: titrate oxygen, increase mobilization  Patient Goals: get better, go home  Family Goals: not present    Progress made toward(s) clinical / shift goals:    Problem: Fall Risk  Goal: Patient will remain free from falls  Outcome: Progressing     Problem: Knowledge Deficit - Standard  Goal: Patient and family/care givers will demonstrate understanding of plan of care, disease process/condition, diagnostic tests and medications  Outcome: Progressing     Problem: Hemodynamics  Goal: Patient's hemodynamics, fluid balance and neurologic status will be stable or improve  Outcome: Progressing     Problem: Respiratory  Goal: Patient will achieve/maintain optimum respiratory ventilation and gas exchange  Outcome: Progressing  Note: Patient's oxygen came down to 2 L from 5 L yesterday.     Problem: Infection - Standard  Goal: Patient will remain free from infection  Outcome: Progressing     Problem: Pain - Standard  Goal: Alleviation of pain or a reduction in pain to the patient’s comfort goal  Outcome: Progressing     Problem: Skin Integrity  Goal: Skin integrity is maintained or improved  Outcome: Progressing       Patient is not progressing towards the following goals:

## 2023-10-11 NOTE — PROGRESS NOTES
NOC HOSPITALIST CROSS COVER    Notified by RN regarding magnesium of 1.4. Replaced with mag sulfate 4 g IV.        -----------------------------------------------------------------------------------------------------------    Electronically signed by:  Devang Og, DNP, APRN, AGATAMARAP-BC  Hospitalist Services

## 2023-10-11 NOTE — PROGRESS NOTES
Monitor Summary:   Rhythm: SR  Rate: 69-82  Measurement: .21/.04/.38  Ectopy: r pac r pvc

## 2023-10-11 NOTE — THERAPY
Speech Language Pathology   Daily Treatment     Patient Name: Blair Celeste  AGE:  68 y.o., SEX:  male  Medical Record #: [id number]  Date of Service: 10/11/2023      Precautions:  Precautions: Fall Risk       Subjective  RN cleared patient for session. Pt asleep upon arrival, easily arousable. Pt denied any difficulties with swallowing over last few days. Reported occasional expectoration of phlegm, however denied any coughing up food. Pt wearing oxymask upon arrival, pt independently removed mask briefly for oral intake, no desaturation noted. Agreeable to PO trials.     Assessment  PO trials of thin liquids, soft&bite sized, and regular solids presented. Pt able to self-feed without difficulty. Pt demo'd adequate oral bolus acceptance, containment, and clearance. Timely and complete mastication of solids. No oral bolus residue appreciated. No overt s/sx of airway invasion across trials. Pt denied any globus sensation. Pt endorsed feeling better compared to previous week. Provided education regarding general aspiration precautions and signs of aspiration, pt stated understanding.       Clinical Impressions  Patient presents with a functional oropharyngeal swallow mechanism. No overt s/sx of aspiration with oral intake appreciated. Recommend continue diet of regular solids with thin liquids. No further acute speech therapy needs indicated at this time. Please re-consult with any concerns or difficulties with oral intake.       Recommendations  Treatment Completed: Dysphagia Treatment  Consult Referral(s): Gastroenterologist    Dysphagia Treatment  Diet Consistency: Regular solids/thin liquids  Instrumentation: None indicated at this time  Medication: As tolerated  Supervision: Independent  Positioning: Fully upright and midline during oral intake  Risk Management : Small bites/sips, Slow rate of intake, Physical mobility, as tolerated  Oral Care: BID      SLP Treatment Plan  Treatment Plan: None  Indicated      Anticipated Discharge Needs  Discharge Recommendations: Anticipate that the patient will have no further speech therapy needs after discharge from the hospital  Therapy Recommendations Upon DC: Not Indicated      Patient / Family Goals  Patient / Family Goal #1: I want to find out what's going on  Goal #1 Outcome: Goal met  Short Term Goals  Short Term Goal # 1: Pt will participate in instrumentation to define swallow physioogy and determine ST POC  Goal Outcome # 1: Goal not met  Short Term Goal # 2: Pt will consume a diet of regular solids and thin liquids with no overt s/sx of aspiration or decline in respiratory status  Goal Outcome # 2 : Goal met      Hemal Jacobs, SLP

## 2023-10-13 LAB
BACTERIA FLD AEROBE CULT: NORMAL
BACTERIA FLD AEROBE CULT: NORMAL
GRAM STN SPEC: NORMAL
GRAM STN SPEC: NORMAL
SIGNIFICANT IND 70042: NORMAL
SIGNIFICANT IND 70042: NORMAL
SITE SITE: NORMAL
SITE SITE: NORMAL
SOURCE SOURCE: NORMAL
SOURCE SOURCE: NORMAL

## 2023-10-17 ENCOUNTER — TELEPHONE (OUTPATIENT)
Dept: HEALTH INFORMATION MANAGEMENT | Facility: OTHER | Age: 68
End: 2023-10-17
Payer: MEDICARE

## 2023-10-17 NOTE — PROGRESS NOTES
Pulmonary Clinic- Initial Consult    Date of Service: 10/18/2023    Referring Physician: Kai Alfred M.D.    Reason for Consult: Hypoxemic respiratory failure, RAYMOND, obesity hypoventilation syndrome, pulmonary nodule, right lower lobe consolidation    Chief Complaint:   Chief Complaint   Patient presents with    Hospital Follow-up     Referred by Dr Alfred for Hypoxia  ED 10/04/23-10/11/23. Pulm. consult: 10/06/23    Other     CXR 10/10/23       HPI:   Blair Celeste is a 68 y.o. male who is referred to the pulmonary clinic for hypoxemic respiratory failure, RAYMOND, obesity hypoventilation syndrome, pulmonary nodule, right lower lobe consolidation.   Patient was admitted to the hospital from 10/3/2023 to 10/11/2023 with aspiration pneumonia, acute on chronic hypercapnic respiratory failure, and new onset hypoxemia.  He reports that he has history of significant alcohol use, drinking approximately 1/2 to 1 L of liquor a day.  He he is unsure if he aspirated prior to being admitted to the hospital, but suspects that that is what happened.  During his hospitalization he developed encephalopathy and was found to have acute on chronic hypercapnic respiratory failure and was treated with BiPAP in the ICU.  Patient also was found to have chronic RV failure due to pulmonary hypertension, suspect due to RAYMOND and chronic hypoxemia.  He required IV diuresis for this as well as ongoing BiPAP and CPAP support.  During his stay, patient had a CT scan showing a right lower lobe consolidation could be consistent with pneumonia versus underlying mass.  He also has a 14 mm pulmonary nodule in the right middle lobe.  Since his discharge, patient has been sober and intends to stay that way.  He is scheduled for a sleep study test in May, but is on a cancellation list and hopes to be able to complete that within the next month.  He has already been referred to sleep medicine.  Patient also has lost 70 pounds and states that  his breathing already feels better.  He is a former smoker, approximately 40-50-pack-year history and quit in .    Past Medical History:   Diagnosis Date    COPD (chronic obstructive pulmonary disease) (HCC)     Cough     Diabetes (HCC)     Hypertension     Sputum production        Past Surgical History:   Procedure Laterality Date    APPENDECTOMY      OTHER ABDOMINAL SURGERY         Social History     Socioeconomic History    Marital status:      Spouse name: Not on file    Number of children: Not on file    Years of education: Not on file    Highest education level: Not on file   Occupational History    Not on file   Tobacco Use    Smoking status: Former     Types: Cigars     Start date:      Quit date:      Years since quittin.8    Smokeless tobacco: Never   Substance and Sexual Activity    Alcohol use: Not Currently    Drug use: Never    Sexual activity: Not on file   Other Topics Concern    Not on file   Social History Narrative    Not on file     Social Determinants of Health     Financial Resource Strain: Not on file   Food Insecurity: Not on file   Transportation Needs: Not on file   Physical Activity: Not on file   Stress: Not on file   Social Connections: Not on file   Intimate Partner Violence: Not on file   Housing Stability: Not on file          No family history on file.    Current Outpatient Medications on File Prior to Visit   Medication Sig Dispense Refill    amLODIPine (NORVASC) 10 MG Tab Take 1 Tablet by mouth every day. 30 Tablet 1    colchicine (COLCRYS) 0.6 MG Tab Take 1 Tablet by mouth every day for 49 days. 49 Tablet 0    metoprolol SR (TOPROL XL) 100 MG TABLET SR 24 HR Take 1 Tablet by mouth every day. 30 Tablet 1    olmesartan (BENICAR) 20 MG Tab Take 1 Tablet by mouth every day. 30 Tablet 1    tamsulosin (FLOMAX) 0.4 MG capsule Take 1 Capsule by mouth 1/2 hour after breakfast. 30 Capsule 1    thiamine (THIAMINE) 100 MG tablet Take 1 Tablet by mouth every day. 30  "Tablet 1    furosemide (LASIX) 40 MG Tab Take 40 mg by mouth every day.      allopurinol (ZYLOPRIM) 100 MG Tab Take 100 mg by mouth every day.      dapagliflozin propanediol (FARXIGA) 10 MG Tab Take 10 mg by mouth every day.       No current facility-administered medications on file prior to visit.       Allergies: Patient has no known allergies.    Review of Systems   Constitutional:  Positive for weight loss. Negative for chills and fever.   Respiratory:  Positive for cough, sputum production and shortness of breath. Negative for wheezing.    Cardiovascular:  Positive for leg swelling. Negative for chest pain and palpitations.   Gastrointestinal:  Negative for nausea and vomiting.   Musculoskeletal:  Positive for joint pain.   Psychiatric/Behavioral:  Negative for depression and suicidal ideas.      Vitals:  /66 (BP Location: Right arm, Patient Position: Sitting, BP Cuff Size: Adult)   Pulse 71   Ht 1.854 m (6' 1\")   Wt (!) 141 kg (310 lb)   SpO2 91%     Physical Exam  Vitals reviewed.   Constitutional:       Appearance: Normal appearance. He is obese.   HENT:      Head: Normocephalic.   Eyes:      Conjunctiva/sclera: Conjunctivae normal.   Cardiovascular:      Rate and Rhythm: Normal rate and regular rhythm.   Pulmonary:      Effort: Pulmonary effort is normal.      Comments: Decreased breath sounds due to body habitus  Abdominal:      Palpations: Abdomen is soft.   Skin:     General: Skin is warm and dry.   Neurological:      General: No focal deficit present.      Mental Status: He is alert and oriented to person, place, and time.   Psychiatric:         Mood and Affect: Mood normal.         Behavior: Behavior normal.       Laboratory Data:      Pertinent Studies:    PFTs as reviewed by me personally show:    Imaging as reviewed by me personally show:    CT Chest 10/3/2023  1.  No large central pulmonary embolus is appreciated, evaluation of the subsegmental branches is essentially nondiagnostic due to " motion artifacts. Additional imaging would be required for definitive exclusion of small distal pulmonary emboli.  2.  Masslike consolidation in the right lower lobe, could represent round atelectasis or infiltrate however appearance is concerning for pulmonary mass. Should be considered neoplastic less frequent otherwise.  3.  Hazy right lower lobe opacities suggests component of superimposed infiltrate.  4.  Trace right pleural effusion.  5.  Mediastinal and right hilar lymph nodes, appearance concerning for possible metastatic disease given pulmonary finding.  6.  Fluid in the left upper quadrant adjacent to the spleen, density most typical of ascites  7.  Irregular hepatic contour compatible with changes of cirrhosis.  8.  Atherosclerosis and atherosclerotic coronary artery disease.  9.  Pulmonary nodule, see nodule follow-up recommendations below.    Echo:  10/4/2023  CONCLUSIONS  Hyperdynamic left ventricular systolic function.  The left ventricular ejection fraction is visually estimated to be   greater than 75%.  Calcified aortic valve leaflets with restricted leaflet excursion;   limited views to adequately interrogate to exclude some degree of   aortic stenosis.  The right ventricle is dilated with reduced right ventricular systolic   function.  Unable to estimate pulmonary artery pressure due to an inadequate   tricuspid regurgitant jet.  Dilated inferior vena cava without inspiratory collapse.  The ascending aorta is mildly dilated with a diameter of 3.8 cm.  No prior study is available for comparison.   Recommend focused reinterrogation of the aortic valve       Assessment/Plan:    Problem List Items Addressed This Visit       Acute on chronic respiratory failure with hypoxia and hypercapnia (HCC)     Due to obesity hypoventilation syndrome    Continue with home oxygen  Follow-up PSG, patient may need BiPAP at home         Relevant Orders    Multiple Oximetry    DME O2 New Set Up    Abnormal CT of the  chest     Patient had a right lower lobe consolidation as well as a 14 mm nodule on the right side.  At that time, he was being treated for aspiration pneumonia in the setting of alcohol use.  He does have significant tobacco use    Repeat CT scan in early November  If the nodule is persistent or if there is a mass in the right lower lobe we will proceed with biopsy         Alcohol abuse     In remission, patient thinks that his alcohol use was probably contributing to his apnea    Continues to encourage abstinence         Obstructive sleep apnea     Undiagnosed     Is currently on the waitlist for a PSG, currently scheduled for May   Will refer to sleep medicine after PSG         Obesity hypoventilation syndrome (HCC)     Continue with home oxygen  Encouraged weight loss, patient is already lost 7 pounds  PSG to see if patient needs a BiPAP           Return in about 2 months (around 12/18/2023).     This note was generated using voice recognition software which has a chance of producing errors of grammar and possibly content.  I have made every reasonable attempt to find and correct any obvious errors, but it should be expected that some may not be found prior to finalization of this note.    Time spent in record review prior to patient arrival, reviewing results, and in face-to-face encounter totaled 63 min, excluding any procedures if performed.    Lia Shaikh, DO   Pulmonary and Critical Care  [facility]

## 2023-10-18 ENCOUNTER — RESEARCH ENCOUNTER (OUTPATIENT)
Dept: SLEEP MEDICINE | Facility: MEDICAL CENTER | Age: 68
End: 2023-10-18
Payer: MEDICARE

## 2023-10-18 ENCOUNTER — OFFICE VISIT (OUTPATIENT)
Dept: SLEEP MEDICINE | Facility: MEDICAL CENTER | Age: 68
End: 2023-10-18
Attending: INTERNAL MEDICINE
Payer: MEDICARE

## 2023-10-18 VITALS
HEART RATE: 71 BPM | OXYGEN SATURATION: 91 % | SYSTOLIC BLOOD PRESSURE: 106 MMHG | HEIGHT: 73 IN | WEIGHT: 310 LBS | BODY MASS INDEX: 41.08 KG/M2 | DIASTOLIC BLOOD PRESSURE: 66 MMHG

## 2023-10-18 DIAGNOSIS — J96.22 ACUTE ON CHRONIC RESPIRATORY FAILURE WITH HYPOXIA AND HYPERCAPNIA (HCC): ICD-10-CM

## 2023-10-18 DIAGNOSIS — E66.2 OBESITY HYPOVENTILATION SYNDROME (HCC): ICD-10-CM

## 2023-10-18 DIAGNOSIS — F10.10 ALCOHOL ABUSE: ICD-10-CM

## 2023-10-18 DIAGNOSIS — G47.33 OBSTRUCTIVE SLEEP APNEA: ICD-10-CM

## 2023-10-18 DIAGNOSIS — E66.2 CLASS 3 OBESITY WITH ALVEOLAR HYPOVENTILATION WITHOUT SERIOUS COMORBIDITY WITH BODY MASS INDEX (BMI) OF 40.0 TO 44.9 IN ADULT (HCC): ICD-10-CM

## 2023-10-18 DIAGNOSIS — J96.21 ACUTE ON CHRONIC RESPIRATORY FAILURE WITH HYPOXIA AND HYPERCAPNIA (HCC): ICD-10-CM

## 2023-10-18 DIAGNOSIS — R93.89 ABNORMAL CT OF THE CHEST: ICD-10-CM

## 2023-10-18 PROCEDURE — 99215 OFFICE O/P EST HI 40 MIN: CPT | Performed by: INTERNAL MEDICINE

## 2023-10-18 PROCEDURE — 94761 N-INVAS EAR/PLS OXIMETRY MLT: CPT | Performed by: INTERNAL MEDICINE

## 2023-10-18 PROCEDURE — 3074F SYST BP LT 130 MM HG: CPT | Performed by: INTERNAL MEDICINE

## 2023-10-18 PROCEDURE — 99212 OFFICE O/P EST SF 10 MIN: CPT | Performed by: INTERNAL MEDICINE

## 2023-10-18 PROCEDURE — 3078F DIAST BP <80 MM HG: CPT | Performed by: INTERNAL MEDICINE

## 2023-10-18 ASSESSMENT — ENCOUNTER SYMPTOMS
VOMITING: 0
WEIGHT LOSS: 1
SHORTNESS OF BREATH: 1
DEPRESSION: 0
WHEEZING: 0
NAUSEA: 0
CHILLS: 0
FEVER: 0
PALPITATIONS: 0
SPUTUM PRODUCTION: 1
COUGH: 1

## 2023-10-18 ASSESSMENT — FIBROSIS 4 INDEX: FIB4 SCORE: 1.24

## 2023-10-18 NOTE — RESEARCH NOTE
Name: Blair Celeste   MRN: [id number]  Participant ID:    : [id number]  Visit Date/Time: 10/18/2023  2:45 PM  Who is present: Jose Celeste and coordinator Christa Meyer    Study:    [id number] - [facility] Lung Screening Phase 2   Status Consented/Enrolled (10/18/2023)   Active Start Date 10/18/23   Participant ID    Coordinator Rakel Faust; Christa Meyer; Víctor Young; Michelle Pyle    Karen Constantino M.D.     Consent note:    Participation in the [facility] Phase 2 clinical trial was discussed with Jose Celeste today. All aspects of the study purpose and procedures were explained. Patient was given ample time to review the consent and all questions were answered to his satisfaction. Patient is aware that the clinical trial is voluntary and hey may withdraw consent at any time without affecting the level of care they receive.  Subject signed consent without coercion and undue influence and was given a copy of the signed consent. No study-related procedures took place prior to consenting and all assessments were conducted per protocol. No adverse events occurred.    Patient randomized to study Arm B test result will not be received.     Nasal swab collected and [place] at [facility] [place] to lab.        Current Outpatient Medications   Medication Sig Dispense Refill    amLODIPine (NORVASC) 10 MG Tab Take 1 Tablet by mouth every day. 30 Tablet 1    colchicine (COLCRYS) 0.6 MG Tab Take 1 Tablet by mouth every day for 49 days. 49 Tablet 0    metoprolol SR (TOPROL XL) 100 MG TABLET SR 24 HR Take 1 Tablet by mouth every day. 30 Tablet 1    olmesartan (BENICAR) 20 MG Tab Take 1 Tablet by mouth every day. 30 Tablet 1    tamsulosin (FLOMAX) 0.4 MG capsule Take 1 Capsule by mouth 1/2 hour after breakfast. 30 Capsule 1    thiamine (THIAMINE) 100 MG tablet Take 1 Tablet by mouth every day. 30 Tablet 1    furosemide (LASIX) 40 MG Tab Take 40 mg by mouth every  day.      allopurinol (ZYLOPRIM) 100 MG Tab Take 100 mg by mouth every day.      dapagliflozin propanediol (FARXIGA) 10 MG Tab Take 10 mg by mouth every day.       No current facility-administered medications for this visit.    current meds    History:    Past Medical History:   Diagnosis Date    COPD (chronic obstructive pulmonary disease) (HCC)     Cough     Diabetes (HCC)     Hypertension     Sputum production        Past Surgical History:   Procedure Laterality Date    APPENDECTOMY      OTHER ABDOMINAL SURGERY          Social History     Tobacco Use    Smoking status: Former     Types: Cigars, Cigarettes     Start date:      Quit date:      Years since quittin.     Passive exposure: Past    Smokeless tobacco: Never   Vaping Use    Vaping Use: Former    Substances: Nicotine    Devices: Pre-filled or refillable cartridge, Refillable tank   Substance Use Topics    Alcohol use: Not Currently    Drug use: Never       No family history on file.

## 2023-10-23 PROBLEM — E66.2 OBESITY HYPOVENTILATION SYNDROME (HCC): Status: ACTIVE | Noted: 2023-10-23

## 2023-10-23 NOTE — ASSESSMENT & PLAN NOTE
Patient had a right lower lobe consolidation as well as a 14 mm nodule on the right side.  At that time, he was being treated for aspiration pneumonia in the setting of alcohol use.  He does have significant tobacco use    Repeat CT scan in early November  If the nodule is persistent or if there is a mass in the right lower lobe we will proceed with biopsy

## 2023-10-23 NOTE — ASSESSMENT & PLAN NOTE
Undiagnosed     Is currently on the waitlist for a PSG, currently scheduled for May   Will refer to sleep medicine after PSG

## 2023-10-23 NOTE — ASSESSMENT & PLAN NOTE
In remission, patient thinks that his alcohol use was probably contributing to his apnea    Continues to encourage abstinence

## 2023-10-23 NOTE — ASSESSMENT & PLAN NOTE
Due to obesity hypoventilation syndrome    Continue with home oxygen  Follow-up PSG, patient may need BiPAP at home

## 2023-10-23 NOTE — ASSESSMENT & PLAN NOTE
Continue with home oxygen  Encouraged weight loss, patient is already lost 7 pounds  PSG to see if patient needs a BiPAP

## 2023-10-24 NOTE — PROCEDURES
Multi-Ox Readings  Multi Ox #1 Room air   O2 sat % at rest 88   O2 sat % on exertion     O2 sat average on exertion     Multi Ox #2 2 LPM   O2 sat % at rest 92   O2 sat % on exertion 91   O2 sat average on exertion       Oxygen Use 2   Oxygen Frequency 24/7   Duration of need     Is the patient mobile within the home?     CPAP Use?     BIPAP Use?     Servo Titration       Patient unable to walk due to debility. Desaturates to 88% without ambulation. Requires 2 lpm NC at rest.     Lia Shaikh,    Pulmonary and Critical Care

## 2023-11-02 ENCOUNTER — HOSPITAL ENCOUNTER (OUTPATIENT)
Dept: RADIOLOGY | Facility: MEDICAL CENTER | Age: 68
End: 2023-11-02
Attending: INTERNAL MEDICINE
Payer: MEDICARE

## 2023-11-02 DIAGNOSIS — R93.89 ABNORMAL CT OF THE CHEST: ICD-10-CM

## 2023-11-02 PROCEDURE — 71250 CT THORAX DX C-: CPT

## 2023-11-03 ENCOUNTER — APPOINTMENT (OUTPATIENT)
Dept: CARDIOLOGY | Facility: MEDICAL CENTER | Age: 68
End: 2023-11-03
Attending: STUDENT IN AN ORGANIZED HEALTH CARE EDUCATION/TRAINING PROGRAM
Payer: MEDICARE

## 2023-11-07 ENCOUNTER — TELEPHONE (OUTPATIENT)
Dept: SLEEP MEDICINE | Facility: MEDICAL CENTER | Age: 68
End: 2023-11-07
Payer: MEDICARE

## 2023-11-07 NOTE — TELEPHONE ENCOUNTER
VOICEMAIL  1. Caller Name: Jose                      Call Back Number: [phone]    2. Message: Lvm stating will like to get results from CT-Scan.     3. Patient approves office to leave a detailed voicemail/MyChart message: N\A

## 2023-11-14 ENCOUNTER — TELEPHONE (OUTPATIENT)
Dept: SLEEP MEDICINE | Facility: MEDICAL CENTER | Age: 68
End: 2023-11-14
Payer: MEDICARE

## 2023-11-14 DIAGNOSIS — R93.89 ABNORMAL CT OF THE CHEST: ICD-10-CM

## 2023-11-14 NOTE — TELEPHONE ENCOUNTER
Reviewed CT scan with the team and consensus was to repeat the CT in 2 months.  I called and spoke with patient and his plan.  He states that he has not required his oxygen during the day, having 98%.  Otherwise, reports that he is feeling well.    Order for repeat CT placed and follow-up in early January.    Lia Shaikh, DO   Pulmonary and Critical Care

## 2024-01-05 ENCOUNTER — APPOINTMENT (OUTPATIENT)
Dept: RADIOLOGY | Facility: MEDICAL CENTER | Age: 69
End: 2024-01-05
Attending: INTERNAL MEDICINE
Payer: MEDICARE

## 2024-01-11 ENCOUNTER — APPOINTMENT (OUTPATIENT)
Dept: SLEEP MEDICINE | Facility: MEDICAL CENTER | Age: 69
End: 2024-01-11
Attending: NURSE PRACTITIONER
Payer: MEDICARE

## 2024-02-06 ENCOUNTER — TELEPHONE (OUTPATIENT)
Dept: SLEEP MEDICINE | Facility: MEDICAL CENTER | Age: 69
End: 2024-02-06
Payer: MEDICARE

## 2024-02-06 NOTE — TELEPHONE ENCOUNTER
VOICEMAIL: 02/05/24  1. Caller Name: Pt's son                      Call Back Number: [phone]    2. Message: Pt son called and lm, re: pain medication for pts cough/tightness in the chest. If we are able to do this.     3. Patient approves office to leave a detailed voicemail/MyChart message: N\A  (Don't see pt's contact list)              VOICEMAIL: 02/05/24  1. Caller Name: Jose                      Call Back Number: [phone]     2. Message: Pt called and lm, I have been calling you 3 weeks. I had a CT that might be cancer.        3. Patient approves office to leave a detailed voicemail/MyChart message: N\A        Pt was called yesterday by our . Pt was scheduled for an appt on 02/09/24 with Dr Hernandez.    Called him [facility] and lm, asking for images to be pushed.

## 2024-02-09 ENCOUNTER — APPOINTMENT (OUTPATIENT)
Dept: SLEEP MEDICINE | Facility: MEDICAL CENTER | Age: 69
End: 2024-02-09
Attending: INTERNAL MEDICINE
Payer: MEDICARE

## 2024-02-16 ENCOUNTER — HOSPITAL ENCOUNTER (OUTPATIENT)
Dept: RADIOLOGY | Facility: MEDICAL CENTER | Age: 69
End: 2024-02-16

## 2024-02-16 ENCOUNTER — TELEMEDICINE (OUTPATIENT)
Dept: SLEEP MEDICINE | Facility: MEDICAL CENTER | Age: 69
End: 2024-02-16
Attending: STUDENT IN AN ORGANIZED HEALTH CARE EDUCATION/TRAINING PROGRAM
Payer: MEDICARE

## 2024-02-16 VITALS
OXYGEN SATURATION: 94 % | DIASTOLIC BLOOD PRESSURE: 85 MMHG | HEART RATE: 79 BPM | SYSTOLIC BLOOD PRESSURE: 138 MMHG | HEIGHT: 73 IN | BODY MASS INDEX: 41.08 KG/M2 | WEIGHT: 310 LBS

## 2024-02-16 DIAGNOSIS — R91.1 LUNG NODULE: ICD-10-CM

## 2024-02-16 PROCEDURE — 99213 OFFICE O/P EST LOW 20 MIN: CPT | Mod: 95 | Performed by: STUDENT IN AN ORGANIZED HEALTH CARE EDUCATION/TRAINING PROGRAM

## 2024-02-16 ASSESSMENT — FIBROSIS 4 INDEX: FIB4 SCORE: 1.24

## 2024-02-16 ASSESSMENT — PATIENT HEALTH QUESTIONNAIRE - PHQ9: CLINICAL INTERPRETATION OF PHQ2 SCORE: 0

## 2024-02-20 NOTE — PROGRESS NOTES
"This evaluation was conducted via [employer] using secure and encrypted videoconferencing technology. The patient was physically located in [place], [place].   The patient's identity was confirmed and verbal consent was obtained for this telemedicine encounter.      Pulmonary Clinic Note    Chief Complaint:  Chief Complaint   Patient presents with    Follow-Up     Abnormal CT of the chest. Last seen 10/18/23    Results     CT-Chest  01/17/24     HPI:   \"Blair Celeste is a 68 y.o. male who is referred to the pulmonary clinic for hypoxemic respiratory failure, RAYMOND, obesity hypoventilation syndrome, pulmonary nodule, right lower lobe consolidation.   Patient was admitted to the hospital from 10/3/2023 to 10/11/2023 with aspiration pneumonia, acute on chronic hypercapnic respiratory failure, and new onset hypoxemia.  He reports that he has history of significant alcohol use, drinking approximately 1/2 to 1 L of liquor a day.  He he is unsure if he aspirated prior to being admitted to the hospital, but suspects that that is what happened.  During his hospitalization he developed encephalopathy and was found to have acute on chronic hypercapnic respiratory failure and was treated with BiPAP in the ICU.  Patient also was found to have chronic RV failure due to pulmonary hypertension, suspect due to RAYMOND and chronic hypoxemia.  He required IV diuresis for this as well as ongoing BiPAP and CPAP support.  During his stay, patient had a CT scan showing a right lower lobe consolidation could be consistent with pneumonia versus underlying mass.  He also has a 14 mm pulmonary nodule in the right middle lobe.  Since his discharge, patient has been sober and intends to stay that way.  He is scheduled for a sleep study test in May, but is on a cancellation list and hopes to be able to complete that within the next month.  He has already been referred to sleep medicine.  Patient also has lost 70 pounds and states that his breathing " "already feels better.  He is a former smoker, approximately 40-50-pack-year history and quit in .\"    24: noted RLL consolidation and treated for aspiration pna w/plan for repeat CT chest. Repeat CT on 2024 noted resolution of the previously seen posterior RUL consolidation with some residual scarring/atelectasis and a 1.4 cm ALFREDITO pulmonary nodule-recommended PET scan.        Past Medical History:   Diagnosis Date    Diabetes (HCC)     Hypertension        Past Surgical History:   Procedure Laterality Date    APPENDECTOMY      OTHER ABDOMINAL SURGERY         Social History     Socioeconomic History    Marital status:      Spouse name: Not on file    Number of children: Not on file    Years of education: Not on file    Highest education level: Not on file   Occupational History    Not on file   Tobacco Use    Smoking status: Former     Types: Cigars, Cigarettes     Start date:      Quit date:      Years since quittin.     Passive exposure: Past    Smokeless tobacco: Never   Vaping Use    Vaping Use: Former    Substances: Nicotine    Devices: Pre-filled or refillable cartridge, Refillable tank   Substance and Sexual Activity    Alcohol use: Not Currently    Drug use: Never    Sexual activity: Not on file   Other Topics Concern    Not on file   Social History Narrative    Not on file     Social Determinants of Health     Financial Resource Strain: Not on file   Food Insecurity: Not on file   Transportation Needs: Not on file   Physical Activity: Not on file   Stress: Not on file   Social Connections: Not on file   Intimate Partner Violence: Not on file   Housing Stability: Not on file          No family history on file.  There is no pertinent family history.     Current Outpatient Medications on File Prior to Visit   Medication Sig Dispense Refill    amLODIPine (NORVASC) 10 MG Tab Take 1 Tablet by mouth every day. 30 Tablet 1    metoprolol SR (TOPROL XL) 100 MG TABLET SR 24 HR Take " "1 Tablet by mouth every day. 30 Tablet 1    olmesartan (BENICAR) 20 MG Tab Take 1 Tablet by mouth every day. 30 Tablet 1    tamsulosin (FLOMAX) 0.4 MG capsule Take 1 Capsule by mouth 1/2 hour after breakfast. 30 Capsule 1    thiamine (THIAMINE) 100 MG tablet Take 1 Tablet by mouth every day. 30 Tablet 1    furosemide (LASIX) 40 MG Tab Take 40 mg by mouth every day.      allopurinol (ZYLOPRIM) 100 MG Tab Take 100 mg by mouth every day.      dapagliflozin propanediol (FARXIGA) 10 MG Tab Take 10 mg by mouth every day.       No current facility-administered medications on file prior to visit.       Allergies: Patient has no known allergies.      ROS:   As noted in HPI    Vitals:  /85 (BP Location: Left arm, Patient Position: Sitting, BP Cuff Size: Adult)   Pulse 79   Ht 1.854 m (6' 1\")   Wt (!) 141 kg (310 lb)   SpO2 94%     Physical Exam:  Limited due to telemedicine visit    Data:    reviewed    Assessment/Plan:    ALFREDITO lung nodule  RLL consolidation  Images unavailable to me but report from CT chest at [facility] on 1/17/2024 notes resolution of the previously seen posterior RUL consolidation with residual scarring/atelectasis and a 1.4 cm ALFREDITO nodule-this ALFREDITO nodule is concerning for potential malignancy given his extensive smoking history.    -Discussed CT findings in detail with patient and plan for either PET/CT versus repeat CT scan 6 weeks -will go ahead and proceed with PET/CT at this time - if patient is unable to get it scheduled in [place], then he will let us know and we can probably pursue repeat CT to see if this nodule has grown and consider further diagnostic workup at that point.      Return After PET/CT.     This note was generated using voice recognition software which has a chance of producing errors of grammar and possibly content.  I have made every reasonable attempt to find and correct any obvious errors, but it should be expected that some may not be found prior to finalization " of this note.    Time spent in record review prior to patient arrival, reviewing results, and in face-to-face encounter totaled 20 min, excluding any procedures if performed.    Vani Freeman MD  Pulmonary and Critical Care Medicine  [facility]

## 2024-03-04 ENCOUNTER — TELEPHONE (OUTPATIENT)
Dept: SLEEP MEDICINE | Facility: MEDICAL CENTER | Age: 69
End: 2024-03-04
Payer: MEDICARE

## 2024-03-04 DIAGNOSIS — J96.21 ACUTE ON CHRONIC RESPIRATORY FAILURE WITH HYPOXIA AND HYPERCAPNIA (HCC): ICD-10-CM

## 2024-03-04 DIAGNOSIS — J96.22 ACUTE ON CHRONIC RESPIRATORY FAILURE WITH HYPOXIA AND HYPERCAPNIA (HCC): ICD-10-CM

## 2024-03-04 DIAGNOSIS — R93.89 ABNORMAL CT OF THE CHEST: ICD-10-CM

## 2024-03-05 NOTE — TELEPHONE ENCOUNTER
Patient is requesting for us to get images from CT done at [facility]. We have results in [facility], I contacted patient to schedule an appt but he would like to transfer to Pul at [facility] if there is pulm at that hospital. Patient lives in [place] but would like to wait to schedule until we can get images from the hospital.     Pt requesting a referral to pulm at [facility]

## 2024-05-15 ENCOUNTER — APPOINTMENT (OUTPATIENT)
Dept: SLEEP MEDICINE | Facility: MEDICAL CENTER | Age: 69
End: 2024-05-15
Attending: INTERNAL MEDICINE
Payer: MEDICARE